# Patient Record
Sex: FEMALE | Race: BLACK OR AFRICAN AMERICAN | NOT HISPANIC OR LATINO | URBAN - METROPOLITAN AREA
[De-identification: names, ages, dates, MRNs, and addresses within clinical notes are randomized per-mention and may not be internally consistent; named-entity substitution may affect disease eponyms.]

---
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
## 2018-01-09 ENCOUNTER — EMERGENCY (EMERGENCY)
Facility: HOSPITAL | Age: 18
LOS: 1 days | Discharge: HOME | End: 2018-01-09
Attending: EMERGENCY MEDICINE
Payer: MEDICAID

## 2018-01-09 VITALS
HEIGHT: 63 IN | RESPIRATION RATE: 20 BRPM | SYSTOLIC BLOOD PRESSURE: 126 MMHG | HEART RATE: 102 BPM | WEIGHT: 130.07 LBS | TEMPERATURE: 99 F | DIASTOLIC BLOOD PRESSURE: 84 MMHG

## 2018-01-09 PROCEDURE — 70486 CT MAXILLOFACIAL W/O DYE: CPT | Mod: 26

## 2018-01-09 PROCEDURE — 99284 EMERGENCY DEPT VISIT MOD MDM: CPT

## 2018-01-09 PROCEDURE — 99284 EMERGENCY DEPT VISIT MOD MDM: CPT | Mod: 25

## 2018-01-09 PROCEDURE — 70486 CT MAXILLOFACIAL W/O DYE: CPT

## 2018-01-09 RX ORDER — OXYCODONE AND ACETAMINOPHEN 5; 325 MG/1; MG/1
1 TABLET ORAL ONCE
Qty: 0 | Refills: 0 | Status: DISCONTINUED | OUTPATIENT
Start: 2018-01-09 | End: 2018-01-09

## 2018-01-09 RX ADMIN — OXYCODONE AND ACETAMINOPHEN 1 TABLET(S): 5; 325 TABLET ORAL at 22:02

## 2018-01-09 NOTE — ED PROVIDER NOTE - ATTENDING CONTRIBUTION TO CARE
I, Francine Dee, performed the initial face to face bedside interview with this patient regarding history of present illness, review of symptoms and relevant past medical, social and family history.  I completed an independent physical examination.  I was the initial provider who evaluated this patient. I have signed out the follow up of any pending tests (i.e. labs, radiological studies) to the ACP.  I have communicated the patient’s plan of care and disposition with the ACP.

## 2018-01-09 NOTE — ED PROVIDER NOTE - PROGRESS NOTE DETAILS
CT results discussed with PT. ENT consult called. PT pain controlled with PO medication. ENT Andrés, advises PT to follow up as out PT. PT given referral to ENT. PT verbalized understanding of diagnosis and importance of follow up at [facility]. PT educated on importance of follow up and when to return to the ED.
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
## 2018-01-09 NOTE — ED PROVIDER NOTE - ENMT, MLM
Airway patent, Mouth with normal mucosa. Throat has no vesicles, no oropharyngeal exudates and uvula is midline. no septal hematoma. No blood in nares Airway patent, Mouth with normal mucosa. Throat has no vesicles, no oropharyngeal exudates and uvula is midline. no septal hematoma. No blood in nares. nose appears midline no obvious deformities + tenderness to nasal bridge and L maxillary

## 2018-01-10 VITALS
OXYGEN SATURATION: 99 % | TEMPERATURE: 98 F | SYSTOLIC BLOOD PRESSURE: 116 MMHG | RESPIRATION RATE: 18 BRPM | HEART RATE: 99 BPM | DIASTOLIC BLOOD PRESSURE: 79 MMHG

## 2018-01-14 ENCOUNTER — EMERGENCY (EMERGENCY)
Facility: HOSPITAL | Age: 18
LOS: 1 days | Discharge: HOME | End: 2018-01-14
Attending: EMERGENCY MEDICINE | Admitting: EMERGENCY MEDICINE
Payer: MEDICAID

## 2018-01-14 VITALS
OXYGEN SATURATION: 100 % | SYSTOLIC BLOOD PRESSURE: 122 MMHG | TEMPERATURE: 98 F | HEART RATE: 80 BPM | DIASTOLIC BLOOD PRESSURE: 83 MMHG | RESPIRATION RATE: 18 BRPM

## 2018-01-14 VITALS
SYSTOLIC BLOOD PRESSURE: 132 MMHG | DIASTOLIC BLOOD PRESSURE: 88 MMHG | RESPIRATION RATE: 16 BRPM | HEIGHT: 63 IN | TEMPERATURE: 98 F | WEIGHT: 130.07 LBS | HEART RATE: 81 BPM | OXYGEN SATURATION: 100 %

## 2018-01-14 PROCEDURE — 99283 EMERGENCY DEPT VISIT LOW MDM: CPT

## 2018-01-14 PROCEDURE — 99283 EMERGENCY DEPT VISIT LOW MDM: CPT | Mod: 25

## 2018-01-14 RX ORDER — IBUPROFEN 200 MG
600 TABLET ORAL ONCE
Qty: 0 | Refills: 0 | Status: COMPLETED | OUTPATIENT
Start: 2018-01-14 | End: 2018-01-14

## 2018-01-14 RX ORDER — OXYCODONE AND ACETAMINOPHEN 5; 325 MG/1; MG/1
1 TABLET ORAL ONCE
Qty: 0 | Refills: 0 | Status: DISCONTINUED | OUTPATIENT
Start: 2018-01-14 | End: 2018-01-14

## 2018-01-14 RX ORDER — LABETALOL HCL 100 MG
1 TABLET ORAL
Qty: 0 | Refills: 0 | COMMUNITY

## 2018-01-14 RX ORDER — IBUPROFEN 200 MG
1 TABLET ORAL
Qty: 20 | Refills: 0 | OUTPATIENT
Start: 2018-01-14 | End: 2018-01-18

## 2018-01-14 RX ADMIN — Medication 600 MILLIGRAM(S): at 02:49
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
## 2018-01-14 NOTE — ED PROVIDER NOTE - MEDICAL DECISION MAKING DETAILS
Will give pain meds, needs ENT follow-up. Explained to patient that she needs follow-up and cannot report to ED every time pain meds are finished.
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
## 2018-03-11 ENCOUNTER — EMERGENCY (EMERGENCY)
Facility: HOSPITAL | Age: 18
LOS: 1 days | Discharge: HOME | End: 2018-03-11
Attending: EMERGENCY MEDICINE
Payer: MEDICAID

## 2018-03-11 VITALS
RESPIRATION RATE: 16 BRPM | HEART RATE: 105 BPM | SYSTOLIC BLOOD PRESSURE: 122 MMHG | TEMPERATURE: 98 F | HEIGHT: 65 IN | OXYGEN SATURATION: 99 % | WEIGHT: 115.08 LBS | DIASTOLIC BLOOD PRESSURE: 84 MMHG

## 2018-03-11 LAB
ACETONE SERPL-MCNC: NEGATIVE — SIGNIFICANT CHANGE UP
ALBUMIN SERPL ELPH-MCNC: 4.6 G/DL — SIGNIFICANT CHANGE UP (ref 3.3–5.2)
ALP SERPL-CCNC: 128 U/L — HIGH (ref 40–120)
ALT FLD-CCNC: 12 U/L — SIGNIFICANT CHANGE UP
ANION GAP SERPL CALC-SCNC: 21 MMOL/L — HIGH (ref 5–17)
APPEARANCE UR: ABNORMAL
AST SERPL-CCNC: 15 U/L — SIGNIFICANT CHANGE UP
BACTERIA # UR AUTO: ABNORMAL
BASOPHILS # BLD AUTO: 0 K/UL — SIGNIFICANT CHANGE UP (ref 0–0.2)
BASOPHILS NFR BLD AUTO: 0.2 % — SIGNIFICANT CHANGE UP (ref 0–2)
BILIRUB SERPL-MCNC: 0.3 MG/DL — LOW (ref 0.4–2)
BILIRUB UR-MCNC: NEGATIVE — SIGNIFICANT CHANGE UP
BUN SERPL-MCNC: 16 MG/DL — SIGNIFICANT CHANGE UP (ref 8–20)
CALCIUM SERPL-MCNC: 11 MG/DL — HIGH (ref 8.6–10.2)
CHLORIDE SERPL-SCNC: 93 MMOL/L — LOW (ref 98–107)
CO2 SERPL-SCNC: 20 MMOL/L — LOW (ref 22–29)
COLOR SPEC: YELLOW — SIGNIFICANT CHANGE UP
CREAT SERPL-MCNC: 0.9 MG/DL — SIGNIFICANT CHANGE UP (ref 0.5–1.3)
DIFF PNL FLD: ABNORMAL
EOSINOPHIL # BLD AUTO: 0 K/UL — SIGNIFICANT CHANGE UP (ref 0–0.5)
EOSINOPHIL NFR BLD AUTO: 0.2 % — SIGNIFICANT CHANGE UP (ref 0–6)
EPI CELLS # UR: SIGNIFICANT CHANGE UP
GAS PNL BLDV: SIGNIFICANT CHANGE UP
GLUCOSE SERPL-MCNC: 481 MG/DL — HIGH (ref 70–115)
GLUCOSE UR QL: 1000 MG/DL
HCG SERPL-ACNC: <5 MIU/ML — SIGNIFICANT CHANGE UP
HCO3 BLDV-SCNC: 20 MMOL/L — SIGNIFICANT CHANGE UP (ref 20–26)
HCT VFR BLD CALC: 36.5 % — LOW (ref 37–47)
HGB BLD-MCNC: 12 G/DL — SIGNIFICANT CHANGE UP (ref 12–16)
KETONES UR-MCNC: ABNORMAL
LEUKOCYTE ESTERASE UR-ACNC: ABNORMAL
LYMPHOCYTES # BLD AUTO: 1.5 K/UL — SIGNIFICANT CHANGE UP (ref 1–4.8)
LYMPHOCYTES # BLD AUTO: 22.5 % — SIGNIFICANT CHANGE UP (ref 20–55)
MCHC RBC-ENTMCNC: 26.7 PG — LOW (ref 27–31)
MCHC RBC-ENTMCNC: 32.9 G/DL — SIGNIFICANT CHANGE UP (ref 32–36)
MCV RBC AUTO: 81.1 FL — SIGNIFICANT CHANGE UP (ref 81–99)
MONOCYTES # BLD AUTO: 0.3 K/UL — SIGNIFICANT CHANGE UP (ref 0–0.8)
MONOCYTES NFR BLD AUTO: 4.1 % — SIGNIFICANT CHANGE UP (ref 3–10)
NEUTROPHILS # BLD AUTO: 4.8 K/UL — SIGNIFICANT CHANGE UP (ref 1.8–8)
NEUTROPHILS NFR BLD AUTO: 73 % — SIGNIFICANT CHANGE UP (ref 37–73)
NITRITE UR-MCNC: NEGATIVE — SIGNIFICANT CHANGE UP
PCO2 BLDV: 44 MMHG — SIGNIFICANT CHANGE UP (ref 35–50)
PH BLDV: 7.29 — LOW (ref 7.32–7.43)
PH UR: 6 — SIGNIFICANT CHANGE UP (ref 5–8)
PLATELET # BLD AUTO: 562 K/UL — HIGH (ref 150–400)
PO2 BLDV: 62 MMHG — HIGH (ref 25–45)
POTASSIUM SERPL-MCNC: 4 MMOL/L — SIGNIFICANT CHANGE UP (ref 3.5–5.3)
POTASSIUM SERPL-SCNC: 4 MMOL/L — SIGNIFICANT CHANGE UP (ref 3.5–5.3)
PROT SERPL-MCNC: 8.4 G/DL — SIGNIFICANT CHANGE UP (ref 6.6–8.7)
PROT UR-MCNC: 30 MG/DL
RAPID RVP RESULT: DETECTED
RBC # BLD: 4.5 M/UL — SIGNIFICANT CHANGE UP (ref 4.4–5.2)
RBC # FLD: 13.7 % — SIGNIFICANT CHANGE UP (ref 11–15.6)
RBC CASTS # UR COMP ASSIST: ABNORMAL /HPF (ref 0–4)
RV+EV RNA SPEC QL NAA+PROBE: DETECTED
SAO2 % BLDV: 89 % — SIGNIFICANT CHANGE UP
SODIUM SERPL-SCNC: 134 MMOL/L — LOW (ref 135–145)
SP GR SPEC: 1.01 — SIGNIFICANT CHANGE UP (ref 1.01–1.02)
UROBILINOGEN FLD QL: NEGATIVE MG/DL — SIGNIFICANT CHANGE UP
WBC # BLD: 6.6 K/UL — SIGNIFICANT CHANGE UP (ref 4.8–10.8)
WBC # FLD AUTO: 6.6 K/UL — SIGNIFICANT CHANGE UP (ref 4.8–10.8)
WBC UR QL: SIGNIFICANT CHANGE UP

## 2018-03-11 PROCEDURE — 82803 BLOOD GASES ANY COMBINATION: CPT

## 2018-03-11 PROCEDURE — 85027 COMPLETE CBC AUTOMATED: CPT

## 2018-03-11 PROCEDURE — 87798 DETECT AGENT NOS DNA AMP: CPT

## 2018-03-11 PROCEDURE — 71046 X-RAY EXAM CHEST 2 VIEWS: CPT

## 2018-03-11 PROCEDURE — 80053 COMPREHEN METABOLIC PANEL: CPT

## 2018-03-11 PROCEDURE — 87486 CHLMYD PNEUM DNA AMP PROBE: CPT

## 2018-03-11 PROCEDURE — 94640 AIRWAY INHALATION TREATMENT: CPT

## 2018-03-11 PROCEDURE — 87633 RESP VIRUS 12-25 TARGETS: CPT

## 2018-03-11 PROCEDURE — 87086 URINE CULTURE/COLONY COUNT: CPT

## 2018-03-11 PROCEDURE — 93005 ELECTROCARDIOGRAM TRACING: CPT

## 2018-03-11 PROCEDURE — 84702 CHORIONIC GONADOTROPIN TEST: CPT

## 2018-03-11 PROCEDURE — 87581 M.PNEUMON DNA AMP PROBE: CPT

## 2018-03-11 PROCEDURE — 96375 TX/PRO/DX INJ NEW DRUG ADDON: CPT

## 2018-03-11 PROCEDURE — 96374 THER/PROPH/DIAG INJ IV PUSH: CPT

## 2018-03-11 PROCEDURE — 82009 KETONE BODYS QUAL: CPT

## 2018-03-11 PROCEDURE — 93010 ELECTROCARDIOGRAM REPORT: CPT

## 2018-03-11 PROCEDURE — 82962 GLUCOSE BLOOD TEST: CPT

## 2018-03-11 PROCEDURE — 81001 URINALYSIS AUTO W/SCOPE: CPT

## 2018-03-11 PROCEDURE — 36415 COLL VENOUS BLD VENIPUNCTURE: CPT

## 2018-03-11 PROCEDURE — 99285 EMERGENCY DEPT VISIT HI MDM: CPT

## 2018-03-11 PROCEDURE — 99284 EMERGENCY DEPT VISIT MOD MDM: CPT | Mod: 25

## 2018-03-11 PROCEDURE — 71046 X-RAY EXAM CHEST 2 VIEWS: CPT | Mod: 26

## 2018-03-11 RX ORDER — IPRATROPIUM/ALBUTEROL SULFATE 18-103MCG
3 AEROSOL WITH ADAPTER (GRAM) INHALATION ONCE
Qty: 0 | Refills: 0 | Status: COMPLETED | OUTPATIENT
Start: 2018-03-11 | End: 2018-03-11

## 2018-03-11 RX ORDER — KETOROLAC TROMETHAMINE 30 MG/ML
15 SYRINGE (ML) INJECTION ONCE
Qty: 0 | Refills: 0 | Status: DISCONTINUED | OUTPATIENT
Start: 2018-03-11 | End: 2018-03-11

## 2018-03-11 RX ORDER — INSULIN HUMAN 100 [IU]/ML
5 INJECTION, SOLUTION SUBCUTANEOUS
Qty: 100 | Refills: 0 | Status: DISCONTINUED | OUTPATIENT
Start: 2018-03-11 | End: 2018-03-15

## 2018-03-11 RX ORDER — SODIUM CHLORIDE 9 MG/ML
2000 INJECTION INTRAMUSCULAR; INTRAVENOUS; SUBCUTANEOUS ONCE
Qty: 0 | Refills: 0 | Status: COMPLETED | OUTPATIENT
Start: 2018-03-11 | End: 2018-03-11

## 2018-03-11 RX ADMIN — SODIUM CHLORIDE 1000 MILLILITER(S): 9 INJECTION INTRAMUSCULAR; INTRAVENOUS; SUBCUTANEOUS at 18:20

## 2018-03-11 RX ADMIN — INSULIN HUMAN 5 UNIT(S)/HR: 100 INJECTION, SOLUTION SUBCUTANEOUS at 19:44

## 2018-03-11 RX ADMIN — Medication 3 MILLILITER(S): at 18:23

## 2018-03-11 RX ADMIN — Medication 15 MILLIGRAM(S): at 18:23

## 2018-03-11 RX ADMIN — Medication 15 MILLIGRAM(S): at 18:44

## 2018-03-11 NOTE — ED ADULT NURSE REASSESSMENT NOTE - NS ED NURSE REASSESS COMMENT FT1
Insulin drip discontinued, pt given turkey sandwich and milk container, offers no complaints, denies pain, resp even and unlabored, color good, LEACH without difficulty, pt with patent 20G IV, awaiting dispo, will continue to monitor

## 2018-03-11 NOTE — ED PROVIDER NOTE - MEDICAL DECISION MAKING DETAILS
Pt presents with hyperglycemia in the setting of viral like infection. Plan to treat symptomatically, check labs to evaluate for DKA, CXR to r/o pneumonia, RVP, reevaluate.

## 2018-03-11 NOTE — ED PROVIDER NOTE - PROGRESS NOTE DETAILS
Repeat accucheck as noted.  Pt given po sandwich and cookies.  EKG sinus tachy @ 103 bpm, with no acute changes.  Pt stable for d/c and instructed to check accucheck AC meals and HS

## 2018-03-11 NOTE — ED PROVIDER NOTE - OBJECTIVE STATEMENT
17yoF with type 1 DM, presenting with 3 days of cough. Pt states she felt fine last night, but woke up nauseated this morning - vomited x 3 NBNB;  was not feeling well, checked her blood sugar and found that her monitor read " HiGH" .;  she states some insulin and went back to sleep ; she woke up again and found her blood sugar to be 536 and still wasn't feeling well so called ambulance;  She reports + rhinitis, sore throat; but denies fevers, diarrhea, abdominal pain. Pt not vaccinated against influenza this year.  Pt's usual insulin regimen is: 25U Lantus QHS and premeal Novolog    Patient endorses compliance with her insulin regimen and denies any missed doses;     ENdocrinology: Modesta  PMD:  Katie

## 2018-03-12 VITALS
SYSTOLIC BLOOD PRESSURE: 110 MMHG | DIASTOLIC BLOOD PRESSURE: 70 MMHG | TEMPERATURE: 98 F | HEART RATE: 91 BPM | OXYGEN SATURATION: 99 % | RESPIRATION RATE: 18 BRPM

## 2018-03-12 LAB
CULTURE RESULTS: SIGNIFICANT CHANGE UP
SPECIMEN SOURCE: SIGNIFICANT CHANGE UP

## 2018-04-22 ENCOUNTER — INPATIENT (INPATIENT)
Facility: HOSPITAL | Age: 18
LOS: 1 days | Discharge: HOME | DRG: 639 | End: 2018-04-24
Attending: HOSPITALIST | Admitting: INTERNAL MEDICINE
Payer: MEDICAID

## 2018-04-22 VITALS
OXYGEN SATURATION: 98 % | RESPIRATION RATE: 20 BRPM | DIASTOLIC BLOOD PRESSURE: 77 MMHG | WEIGHT: 119.93 LBS | HEART RATE: 150 BPM | TEMPERATURE: 98 F | HEIGHT: 62 IN | SYSTOLIC BLOOD PRESSURE: 108 MMHG

## 2018-04-22 DIAGNOSIS — E10.10 TYPE 1 DIABETES MELLITUS WITH KETOACIDOSIS WITHOUT COMA: ICD-10-CM

## 2018-04-22 DIAGNOSIS — E86.0 DEHYDRATION: ICD-10-CM

## 2018-04-22 LAB
ACETONE SERPL-MCNC: ABNORMAL
ACETONE SERPL-MCNC: ABNORMAL
ALBUMIN SERPL ELPH-MCNC: 4.8 G/DL — SIGNIFICANT CHANGE UP (ref 3.3–5.2)
ALP SERPL-CCNC: 109 U/L — SIGNIFICANT CHANGE UP (ref 40–120)
ALT FLD-CCNC: 23 U/L — SIGNIFICANT CHANGE UP
ANION GAP SERPL CALC-SCNC: 19 MMOL/L — HIGH (ref 5–17)
ANION GAP SERPL CALC-SCNC: 22 MMOL/L — HIGH (ref 5–17)
ANION GAP SERPL CALC-SCNC: 23 MMOL/L — HIGH (ref 5–17)
ANION GAP SERPL CALC-SCNC: 30 MMOL/L — HIGH (ref 5–17)
APPEARANCE UR: CLEAR — SIGNIFICANT CHANGE UP
APTT BLD: 26.7 SEC — LOW (ref 27.5–37.4)
AST SERPL-CCNC: 42 U/L — HIGH
BASE EXCESS BLDV CALC-SCNC: -13.4 MMOL/L — LOW (ref -2–2)
BASOPHILS # BLD AUTO: 0 K/UL — SIGNIFICANT CHANGE UP (ref 0–0.2)
BASOPHILS # BLD AUTO: 0 K/UL — SIGNIFICANT CHANGE UP (ref 0–0.2)
BASOPHILS NFR BLD AUTO: 0.2 % — SIGNIFICANT CHANGE UP (ref 0–2)
BASOPHILS NFR BLD AUTO: 0.4 % — SIGNIFICANT CHANGE UP (ref 0–2)
BILIRUB SERPL-MCNC: 0.3 MG/DL — LOW (ref 0.4–2)
BILIRUB UR-MCNC: ABNORMAL
BUN SERPL-MCNC: 14 MG/DL — SIGNIFICANT CHANGE UP (ref 8–20)
BUN SERPL-MCNC: 16 MG/DL — SIGNIFICANT CHANGE UP (ref 8–20)
BUN SERPL-MCNC: 17 MG/DL — SIGNIFICANT CHANGE UP (ref 8–20)
BUN SERPL-MCNC: 19 MG/DL — SIGNIFICANT CHANGE UP (ref 8–20)
CA-I SERPL-SCNC: 1.23 MMOL/L — SIGNIFICANT CHANGE UP (ref 1.15–1.33)
CALCIUM SERPL-MCNC: 10.5 MG/DL — HIGH (ref 8.6–10.2)
CALCIUM SERPL-MCNC: 8.7 MG/DL — SIGNIFICANT CHANGE UP (ref 8.6–10.2)
CALCIUM SERPL-MCNC: 8.8 MG/DL — SIGNIFICANT CHANGE UP (ref 8.6–10.2)
CALCIUM SERPL-MCNC: 8.9 MG/DL — SIGNIFICANT CHANGE UP (ref 8.6–10.2)
CHLORIDE BLDV-SCNC: 109 MMOL/L — HIGH (ref 98–107)
CHLORIDE SERPL-SCNC: 103 MMOL/L — SIGNIFICANT CHANGE UP (ref 98–107)
CHLORIDE SERPL-SCNC: 104 MMOL/L — SIGNIFICANT CHANGE UP (ref 98–107)
CHLORIDE SERPL-SCNC: 97 MMOL/L — LOW (ref 98–107)
CHLORIDE SERPL-SCNC: 98 MMOL/L — SIGNIFICANT CHANGE UP (ref 98–107)
CO2 SERPL-SCNC: 13 MMOL/L — LOW (ref 22–29)
CO2 SERPL-SCNC: 15 MMOL/L — LOW (ref 22–29)
CO2 SERPL-SCNC: 16 MMOL/L — LOW (ref 22–29)
CO2 SERPL-SCNC: 17 MMOL/L — LOW (ref 22–29)
COLOR SPEC: YELLOW — SIGNIFICANT CHANGE UP
COMMENT - URINE: SIGNIFICANT CHANGE UP
CREAT SERPL-MCNC: 0.98 MG/DL — SIGNIFICANT CHANGE UP (ref 0.5–1.3)
CREAT SERPL-MCNC: 1.05 MG/DL — SIGNIFICANT CHANGE UP (ref 0.5–1.3)
CREAT SERPL-MCNC: 1.12 MG/DL — SIGNIFICANT CHANGE UP (ref 0.5–1.3)
CREAT SERPL-MCNC: 1.17 MG/DL — SIGNIFICANT CHANGE UP (ref 0.5–1.3)
DIFF PNL FLD: ABNORMAL
EOSINOPHIL # BLD AUTO: 0 K/UL — SIGNIFICANT CHANGE UP (ref 0–0.5)
EOSINOPHIL # BLD AUTO: 0 K/UL — SIGNIFICANT CHANGE UP (ref 0–0.5)
EOSINOPHIL NFR BLD AUTO: 0 % — SIGNIFICANT CHANGE UP (ref 0–6)
EOSINOPHIL NFR BLD AUTO: 0 % — SIGNIFICANT CHANGE UP (ref 0–6)
EPI CELLS # UR: SIGNIFICANT CHANGE UP
GAS PNL BLDV: 145 MMOL/L — SIGNIFICANT CHANGE UP (ref 135–145)
GAS PNL BLDV: SIGNIFICANT CHANGE UP
GAS PNL BLDV: SIGNIFICANT CHANGE UP
GLUCOSE BLDC GLUCOMTR-MCNC: 106 MG/DL — HIGH (ref 70–99)
GLUCOSE BLDC GLUCOMTR-MCNC: 111 MG/DL — HIGH (ref 70–99)
GLUCOSE BLDC GLUCOMTR-MCNC: 116 MG/DL — HIGH (ref 70–99)
GLUCOSE BLDC GLUCOMTR-MCNC: 117 MG/DL — HIGH (ref 70–99)
GLUCOSE BLDC GLUCOMTR-MCNC: 122 MG/DL — HIGH (ref 70–99)
GLUCOSE BLDC GLUCOMTR-MCNC: 125 MG/DL — HIGH (ref 70–99)
GLUCOSE BLDC GLUCOMTR-MCNC: 126 MG/DL — HIGH (ref 70–99)
GLUCOSE BLDC GLUCOMTR-MCNC: 132 MG/DL — HIGH (ref 70–99)
GLUCOSE BLDC GLUCOMTR-MCNC: 137 MG/DL — HIGH (ref 70–99)
GLUCOSE BLDC GLUCOMTR-MCNC: 141 MG/DL — HIGH (ref 70–99)
GLUCOSE BLDC GLUCOMTR-MCNC: 144 MG/DL — HIGH (ref 70–99)
GLUCOSE BLDC GLUCOMTR-MCNC: 150 MG/DL — HIGH (ref 70–99)
GLUCOSE BLDC GLUCOMTR-MCNC: 156 MG/DL — HIGH (ref 70–99)
GLUCOSE BLDC GLUCOMTR-MCNC: 158 MG/DL — HIGH (ref 70–99)
GLUCOSE BLDC GLUCOMTR-MCNC: 164 MG/DL — HIGH (ref 70–99)
GLUCOSE BLDC GLUCOMTR-MCNC: 89 MG/DL — SIGNIFICANT CHANGE UP (ref 70–99)
GLUCOSE BLDV-MCNC: 138 MG/DL — HIGH (ref 70–99)
GLUCOSE SERPL-MCNC: 119 MG/DL — HIGH (ref 70–115)
GLUCOSE SERPL-MCNC: 145 MG/DL — HIGH (ref 70–115)
GLUCOSE SERPL-MCNC: 287 MG/DL — HIGH (ref 70–115)
GLUCOSE SERPL-MCNC: 63 MG/DL — LOW (ref 70–115)
GLUCOSE UR QL: 250 MG/DL
GRAN CASTS # UR COMP ASSIST: ABNORMAL /LPF
HBA1C BLD-MCNC: 13.7 % — HIGH (ref 4–5.6)
HCG UR QL: ABNORMAL
HCO3 BLDV-SCNC: 14 MMOL/L — LOW (ref 21–29)
HCT VFR BLD CALC: 34 % — LOW (ref 37–47)
HCT VFR BLD CALC: 37.8 % — SIGNIFICANT CHANGE UP (ref 37–47)
HCT VFR BLDA CALC: 37 — LOW (ref 39–50)
HGB BLD CALC-MCNC: 12 G/DL — SIGNIFICANT CHANGE UP (ref 11.5–15.5)
HGB BLD-MCNC: 11 G/DL — LOW (ref 12–16)
HGB BLD-MCNC: 12.4 G/DL — SIGNIFICANT CHANGE UP (ref 12–16)
KETONES UR-MCNC: ABNORMAL
LACTATE BLDV-MCNC: 6.2 MMOL/L — CRITICAL HIGH (ref 0.5–2)
LACTATE BLDV-MCNC: 6.3 MMOL/L — CRITICAL HIGH (ref 0.5–2)
LEUKOCYTE ESTERASE UR-ACNC: NEGATIVE — SIGNIFICANT CHANGE UP
LIDOCAIN IGE QN: 130 U/L — HIGH (ref 22–51)
LYMPHOCYTES # BLD AUTO: 1.3 K/UL — SIGNIFICANT CHANGE UP (ref 1–4.8)
LYMPHOCYTES # BLD AUTO: 1.7 K/UL — SIGNIFICANT CHANGE UP (ref 1–4.8)
LYMPHOCYTES # BLD AUTO: 24.5 % — SIGNIFICANT CHANGE UP (ref 20–55)
LYMPHOCYTES # BLD AUTO: 27.9 % — SIGNIFICANT CHANGE UP (ref 20–55)
MAGNESIUM SERPL-MCNC: 1.8 MG/DL — SIGNIFICANT CHANGE UP (ref 1.6–2.6)
MAGNESIUM SERPL-MCNC: 1.9 MG/DL — SIGNIFICANT CHANGE UP (ref 1.6–2.6)
MAGNESIUM SERPL-MCNC: 2.3 MG/DL — SIGNIFICANT CHANGE UP (ref 1.6–2.6)
MCHC RBC-ENTMCNC: 26.3 PG — LOW (ref 27–31)
MCHC RBC-ENTMCNC: 27 PG — SIGNIFICANT CHANGE UP (ref 27–31)
MCHC RBC-ENTMCNC: 32.4 G/DL — SIGNIFICANT CHANGE UP (ref 32–36)
MCHC RBC-ENTMCNC: 32.8 G/DL — SIGNIFICANT CHANGE UP (ref 32–36)
MCV RBC AUTO: 80.1 FL — LOW (ref 81–99)
MCV RBC AUTO: 83.3 FL — SIGNIFICANT CHANGE UP (ref 81–99)
MONOCYTES # BLD AUTO: 0.4 K/UL — SIGNIFICANT CHANGE UP (ref 0–0.8)
MONOCYTES # BLD AUTO: 0.6 K/UL — SIGNIFICANT CHANGE UP (ref 0–0.8)
MONOCYTES NFR BLD AUTO: 8 % — SIGNIFICANT CHANGE UP (ref 3–10)
MONOCYTES NFR BLD AUTO: 8.5 % — SIGNIFICANT CHANGE UP (ref 3–10)
NEUTROPHILS # BLD AUTO: 3 K/UL — SIGNIFICANT CHANGE UP (ref 1.8–8)
NEUTROPHILS # BLD AUTO: 4.6 K/UL — SIGNIFICANT CHANGE UP (ref 1.8–8)
NEUTROPHILS NFR BLD AUTO: 63 % — SIGNIFICANT CHANGE UP (ref 37–73)
NEUTROPHILS NFR BLD AUTO: 66.8 % — SIGNIFICANT CHANGE UP (ref 37–73)
NITRITE UR-MCNC: NEGATIVE — SIGNIFICANT CHANGE UP
OTHER CELLS CSF MANUAL: 14 ML/DL — LOW (ref 18–22)
PCO2 BLDV: 38 MMHG — SIGNIFICANT CHANGE UP (ref 35–50)
PH BLDV: 7.18 — CRITICAL LOW (ref 7.32–7.43)
PH UR: 5 — SIGNIFICANT CHANGE UP (ref 5–8)
PHOSPHATE SERPL-MCNC: 2 MG/DL — LOW (ref 2.4–4.7)
PHOSPHATE SERPL-MCNC: 2.3 MG/DL — LOW (ref 2.4–4.7)
PHOSPHATE SERPL-MCNC: 3.8 MG/DL — SIGNIFICANT CHANGE UP (ref 2.4–4.7)
PLATELET # BLD AUTO: 535 K/UL — HIGH (ref 150–400)
PLATELET # BLD AUTO: 712 K/UL — HIGH (ref 150–400)
PO2 BLDV: 56 MMHG — HIGH (ref 25–45)
POTASSIUM BLDV-SCNC: 3.9 MMOL/L — SIGNIFICANT CHANGE UP (ref 3.4–4.5)
POTASSIUM SERPL-MCNC: 3.4 MMOL/L — LOW (ref 3.5–5.3)
POTASSIUM SERPL-MCNC: 3.8 MMOL/L — SIGNIFICANT CHANGE UP (ref 3.5–5.3)
POTASSIUM SERPL-MCNC: 4 MMOL/L — SIGNIFICANT CHANGE UP (ref 3.5–5.3)
POTASSIUM SERPL-MCNC: 4.5 MMOL/L — SIGNIFICANT CHANGE UP (ref 3.5–5.3)
POTASSIUM SERPL-SCNC: 3.4 MMOL/L — LOW (ref 3.5–5.3)
POTASSIUM SERPL-SCNC: 3.8 MMOL/L — SIGNIFICANT CHANGE UP (ref 3.5–5.3)
POTASSIUM SERPL-SCNC: 4 MMOL/L — SIGNIFICANT CHANGE UP (ref 3.5–5.3)
POTASSIUM SERPL-SCNC: 4.5 MMOL/L — SIGNIFICANT CHANGE UP (ref 3.5–5.3)
PREGNANCY PROFILE, URINE COMMENT: SIGNIFICANT CHANGE UP
PROT SERPL-MCNC: 8.8 G/DL — HIGH (ref 6.6–8.7)
PROT UR-MCNC: 500 MG/DL
RAPID RVP RESULT: SIGNIFICANT CHANGE UP
RBC # BLD: 4.08 M/UL — LOW (ref 4.4–5.2)
RBC # BLD: 4.72 M/UL — SIGNIFICANT CHANGE UP (ref 4.4–5.2)
RBC # FLD: 16.3 % — HIGH (ref 11–15.6)
RBC # FLD: 16.4 % — HIGH (ref 11–15.6)
RBC CASTS # UR COMP ASSIST: SIGNIFICANT CHANGE UP /HPF (ref 0–4)
SAO2 % BLDV: 86 % — SIGNIFICANT CHANGE UP
SODIUM SERPL-SCNC: 135 MMOL/L — SIGNIFICANT CHANGE UP (ref 135–145)
SODIUM SERPL-SCNC: 139 MMOL/L — SIGNIFICANT CHANGE UP (ref 135–145)
SODIUM SERPL-SCNC: 141 MMOL/L — SIGNIFICANT CHANGE UP (ref 135–145)
SODIUM SERPL-SCNC: 142 MMOL/L — SIGNIFICANT CHANGE UP (ref 135–145)
SP GR SPEC: 1.03 — HIGH (ref 1.01–1.02)
TSH SERPL-MCNC: 1.59 UIU/ML — SIGNIFICANT CHANGE UP (ref 0.27–4.2)
UROBILINOGEN FLD QL: 1 MG/DL
WBC # BLD: 4.7 K/UL — LOW (ref 4.8–10.8)
WBC # BLD: 6.9 K/UL — SIGNIFICANT CHANGE UP (ref 4.8–10.8)
WBC # FLD AUTO: 4.7 K/UL — LOW (ref 4.8–10.8)
WBC # FLD AUTO: 6.9 K/UL — SIGNIFICANT CHANGE UP (ref 4.8–10.8)
WBC UR QL: SIGNIFICANT CHANGE UP

## 2018-04-22 PROCEDURE — 99291 CRITICAL CARE FIRST HOUR: CPT

## 2018-04-22 PROCEDURE — 93010 ELECTROCARDIOGRAM REPORT: CPT

## 2018-04-22 RX ORDER — INSULIN HUMAN 100 [IU]/ML
3 INJECTION, SOLUTION SUBCUTANEOUS
Qty: 100 | Refills: 0 | Status: DISCONTINUED | OUTPATIENT
Start: 2018-04-22 | End: 2018-04-23

## 2018-04-22 RX ORDER — INSULIN ASPART 100 [IU]/ML
23 INJECTION, SOLUTION SUBCUTANEOUS
Qty: 0 | Refills: 0 | COMMUNITY

## 2018-04-22 RX ORDER — DEXTROSE 50 % IN WATER 50 %
1000 SYRINGE (ML) INTRAVENOUS
Qty: 0 | Refills: 0 | Status: DISCONTINUED | OUTPATIENT
Start: 2018-04-22 | End: 2018-04-23

## 2018-04-22 RX ORDER — SODIUM CHLORIDE 9 MG/ML
1000 INJECTION, SOLUTION INTRAVENOUS
Qty: 0 | Refills: 0 | Status: DISCONTINUED | OUTPATIENT
Start: 2018-04-22 | End: 2018-04-24

## 2018-04-22 RX ORDER — POTASSIUM PHOSPHATE, MONOBASIC POTASSIUM PHOSPHATE, DIBASIC 236; 224 MG/ML; MG/ML
15 INJECTION, SOLUTION INTRAVENOUS ONCE
Qty: 0 | Refills: 0 | Status: COMPLETED | OUTPATIENT
Start: 2018-04-22 | End: 2018-04-22

## 2018-04-22 RX ORDER — MAGNESIUM SULFATE 500 MG/ML
2 VIAL (ML) INJECTION ONCE
Qty: 0 | Refills: 0 | Status: COMPLETED | OUTPATIENT
Start: 2018-04-22 | End: 2018-04-22

## 2018-04-22 RX ORDER — ENOXAPARIN SODIUM 100 MG/ML
0 INJECTION SUBCUTANEOUS
Qty: 0 | Refills: 0 | COMMUNITY

## 2018-04-22 RX ORDER — ENOXAPARIN SODIUM 100 MG/ML
40 INJECTION SUBCUTANEOUS EVERY 24 HOURS
Qty: 0 | Refills: 0 | Status: DISCONTINUED | OUTPATIENT
Start: 2018-04-22 | End: 2018-04-24

## 2018-04-22 RX ORDER — SODIUM CHLORIDE 9 MG/ML
1000 INJECTION, SOLUTION INTRAVENOUS
Qty: 0 | Refills: 0 | Status: DISCONTINUED | OUTPATIENT
Start: 2018-04-22 | End: 2018-04-22

## 2018-04-22 RX ORDER — GLUCAGON INJECTION, SOLUTION 0.5 MG/.1ML
1 INJECTION, SOLUTION SUBCUTANEOUS ONCE
Qty: 0 | Refills: 0 | Status: DISCONTINUED | OUTPATIENT
Start: 2018-04-22 | End: 2018-04-24

## 2018-04-22 RX ORDER — INSULIN ASPART 100 [IU]/ML
0 INJECTION, SOLUTION SUBCUTANEOUS
Qty: 0 | Refills: 0 | COMMUNITY

## 2018-04-22 RX ORDER — DEXTROSE 50 % IN WATER 50 %
1 SYRINGE (ML) INTRAVENOUS ONCE
Qty: 0 | Refills: 0 | Status: DISCONTINUED | OUTPATIENT
Start: 2018-04-22 | End: 2018-04-24

## 2018-04-22 RX ORDER — POTASSIUM PHOSPHATE, MONOBASIC POTASSIUM PHOSPHATE, DIBASIC 236; 224 MG/ML; MG/ML
30 INJECTION, SOLUTION INTRAVENOUS ONCE
Qty: 0 | Refills: 0 | Status: COMPLETED | OUTPATIENT
Start: 2018-04-22 | End: 2018-04-22

## 2018-04-22 RX ORDER — DEXTROSE 50 % IN WATER 50 %
25 SYRINGE (ML) INTRAVENOUS ONCE
Qty: 0 | Refills: 0 | Status: DISCONTINUED | OUTPATIENT
Start: 2018-04-22 | End: 2018-04-24

## 2018-04-22 RX ORDER — DEXTROSE 50 % IN WATER 50 %
12.5 SYRINGE (ML) INTRAVENOUS ONCE
Qty: 0 | Refills: 0 | Status: DISCONTINUED | OUTPATIENT
Start: 2018-04-22 | End: 2018-04-24

## 2018-04-22 RX ORDER — SODIUM CHLORIDE 9 MG/ML
2000 INJECTION INTRAMUSCULAR; INTRAVENOUS; SUBCUTANEOUS ONCE
Qty: 0 | Refills: 0 | Status: COMPLETED | OUTPATIENT
Start: 2018-04-22 | End: 2018-04-22

## 2018-04-22 RX ADMIN — Medication 250 MILLILITER(S): at 22:09

## 2018-04-22 RX ADMIN — SODIUM CHLORIDE 2000 MILLILITER(S): 9 INJECTION INTRAMUSCULAR; INTRAVENOUS; SUBCUTANEOUS at 09:02

## 2018-04-22 RX ADMIN — POTASSIUM PHOSPHATE, MONOBASIC POTASSIUM PHOSPHATE, DIBASIC 62.5 MILLIMOLE(S): 236; 224 INJECTION, SOLUTION INTRAVENOUS at 13:38

## 2018-04-22 RX ADMIN — POTASSIUM PHOSPHATE, MONOBASIC POTASSIUM PHOSPHATE, DIBASIC 83.33 MILLIMOLE(S): 236; 224 INJECTION, SOLUTION INTRAVENOUS at 18:51

## 2018-04-22 RX ADMIN — SODIUM CHLORIDE 200 MILLILITER(S): 9 INJECTION, SOLUTION INTRAVENOUS at 13:39

## 2018-04-22 RX ADMIN — ENOXAPARIN SODIUM 40 MILLIGRAM(S): 100 INJECTION SUBCUTANEOUS at 11:54

## 2018-04-22 RX ADMIN — Medication 50 GRAM(S): at 18:51

## 2018-04-22 RX ADMIN — INSULIN HUMAN 2 UNIT(S)/HR: 100 INJECTION, SOLUTION SUBCUTANEOUS at 22:09

## 2018-04-22 RX ADMIN — Medication 200 MILLILITER(S): at 20:21

## 2018-04-22 RX ADMIN — INSULIN HUMAN 1 UNIT(S)/HR: 100 INJECTION, SOLUTION SUBCUTANEOUS at 20:21

## 2018-04-22 RX ADMIN — INSULIN HUMAN 5 UNIT(S)/HR: 100 INJECTION, SOLUTION SUBCUTANEOUS at 09:42

## 2018-04-22 RX ADMIN — INSULIN HUMAN 1 UNIT(S)/HR: 100 INJECTION, SOLUTION SUBCUTANEOUS at 13:38

## 2018-04-22 NOTE — PATIENT PROFILE ADULT. - REASON FOR ADMISSION
pt states she was visiting her son who is a pt in our pedatric unit and was feeling very "dizzy", ped nurse advised that she be seen in our ER. Pt also shares that she has been experiencing stomach discomfort x 3 days with N & V.

## 2018-04-22 NOTE — PATIENT PROFILE ADULT. - AS SC BRADEN ACTIVITY
Detail Level: Zone
Initiate Treatment: Triamcinolone 0.1% ointment twice daily x4 weeks
(4) walks frequently

## 2018-04-22 NOTE — ED ADULT NURSE NOTE - OBJECTIVE STATEMENT
Patient A&Ox4 complaining of upper abdominal pain & nausea x2days. Denies taking any pain relief medications during this time. Denies any dysuria. LBM: 04/22/2018. LMP: 04/13/2018. Respirations even & unlabored, denies any numbness or tingling. Denies any chest pain, shortness of breath, or dizziness.

## 2018-04-22 NOTE — ED ADULT NURSE REASSESSMENT NOTE - NS ED NURSE REASSESS COMMENT FT1
Pt received awake, alert, and oriented x4 with even and unlabored respirations.  on CM. MD aware. C/O epigastric abdominal pain and nausea. Resting in stretcher with CM in place. Denies CP and SOB. Awaiting orders.
Pt transported to MICU and bedside report given to Wanda FORD. No alteration or decline in mental or resp status. Safety maintained at all times.
Pt resting in stretcher with even and unlabored respirations. Denies CP or SOB. CM remains in place with sinus tach. Insulin gtt held after fs 55. Pt alert and oriented x4 with out any distress present. Speech clear, logical, and coherent. Denies HA, nausea, or vomiting. Call placed to Audrey PA for maintenance fluid order. Pt awaiting transport to MICU. Spoke with Wanda FORD and made aware of pts arrival to unit. Safety maintained. RN to continue to monitor and reassess closely.

## 2018-04-22 NOTE — ED PROVIDER NOTE - OBJECTIVE STATEMENT
16 y/o  F pt with a hx of type 1 DB( takes insulin)and asthma presents to ED c/o upper abd pain, weakness and fatigue. Pt says she has been compliant with medications. Denies nausea no vomiting, No further complaints at this time.   lungs diminish hearts tachy dry Moist mucous mebranes mild tenders upper portionof abd no gaurdingno rebound mdm dka treata ccordingly iccu consulted at 9000 ok with inskluin drip no bolus 2 l of fluids infuded heart waves micu admission lethargic and 18 y/o  F pt with a hx of type 1 DB( takes insulin)and asthma presents to ED c/o upper abd pain, weakness and fatigue. Pt says she has been compliant with medications. Denies nausea no vomiting, No further complaints at this time.

## 2018-04-22 NOTE — ED PROVIDER NOTE - MEDICAL DECISION MAKING DETAILS
DKA treat accordingly. ICU consulted at 0900. Ok with insulin drip. No bolus 2 liters of fluids infuse. MICU admission.

## 2018-04-22 NOTE — H&P ADULT - ATTENDING COMMENTS
Pt admitted by [facility] PA, I have seen and evaluated this patient independently and agree with the above findings: 17 year old type I DM p/w nausea and abd discomfort found to be in DKA- continue fingersticks q1 hourly, r/o occult infection, IVF resusc, trend labs til gap closure- pt received bolus of insulin in ER and was hypoglycemic on arrival to ICU - insulin currently on hold.

## 2018-04-22 NOTE — H&P ADULT - HISTORY OF PRESENT ILLNESS
Pt is a 17 YOF h/o DM-I, asthma who presented to ED with h/o 3 days of abdominal pain and cramping associated with N/V.  Pt states her son was admitted to pediatrics last few days with PNA and she has been stressed out and possibly not taking good care of herself.  Pt denies recent illness, no fevers, no cough/cold sx.

## 2018-04-22 NOTE — H&P ADULT - PROBLEM SELECTOR PLAN 1
Volume resuscitation, received 2L in ED  insulin gtt/hourly accuchecks  monitor electrolytes and replete as needed every 6 hours initially  Pt's endocrine does not come here, can f/u as outpt

## 2018-04-23 DIAGNOSIS — J45.909 UNSPECIFIED ASTHMA, UNCOMPLICATED: ICD-10-CM

## 2018-04-23 DIAGNOSIS — E87.6 HYPOKALEMIA: ICD-10-CM

## 2018-04-23 LAB
ANION GAP SERPL CALC-SCNC: 12 MMOL/L — SIGNIFICANT CHANGE UP (ref 5–17)
ANION GAP SERPL CALC-SCNC: 16 MMOL/L — SIGNIFICANT CHANGE UP (ref 5–17)
ANION GAP SERPL CALC-SCNC: 17 MMOL/L — SIGNIFICANT CHANGE UP (ref 5–17)
ANION GAP SERPL CALC-SCNC: 19 MMOL/L — HIGH (ref 5–17)
BUN SERPL-MCNC: 10 MG/DL — SIGNIFICANT CHANGE UP (ref 8–20)
BUN SERPL-MCNC: 4 MG/DL — LOW (ref 8–20)
BUN SERPL-MCNC: 5 MG/DL — LOW (ref 8–20)
BUN SERPL-MCNC: 7 MG/DL — LOW (ref 8–20)
CALCIUM SERPL-MCNC: 8.5 MG/DL — LOW (ref 8.6–10.2)
CALCIUM SERPL-MCNC: 8.5 MG/DL — LOW (ref 8.6–10.2)
CALCIUM SERPL-MCNC: 8.6 MG/DL — SIGNIFICANT CHANGE UP (ref 8.6–10.2)
CALCIUM SERPL-MCNC: 8.6 MG/DL — SIGNIFICANT CHANGE UP (ref 8.6–10.2)
CHLORIDE SERPL-SCNC: 102 MMOL/L — SIGNIFICANT CHANGE UP (ref 98–107)
CHLORIDE SERPL-SCNC: 105 MMOL/L — SIGNIFICANT CHANGE UP (ref 98–107)
CHLORIDE SERPL-SCNC: 108 MMOL/L — HIGH (ref 98–107)
CHLORIDE SERPL-SCNC: 98 MMOL/L — SIGNIFICANT CHANGE UP (ref 98–107)
CO2 SERPL-SCNC: 16 MMOL/L — LOW (ref 22–29)
CO2 SERPL-SCNC: 16 MMOL/L — LOW (ref 22–29)
CO2 SERPL-SCNC: 17 MMOL/L — LOW (ref 22–29)
CO2 SERPL-SCNC: 19 MMOL/L — LOW (ref 22–29)
CREAT SERPL-MCNC: 0.6 MG/DL — SIGNIFICANT CHANGE UP (ref 0.5–1.3)
CREAT SERPL-MCNC: 0.69 MG/DL — SIGNIFICANT CHANGE UP (ref 0.5–1.3)
CREAT SERPL-MCNC: 0.71 MG/DL — SIGNIFICANT CHANGE UP (ref 0.5–1.3)
CREAT SERPL-MCNC: 0.81 MG/DL — SIGNIFICANT CHANGE UP (ref 0.5–1.3)
GLUCOSE BLDC GLUCOMTR-MCNC: 101 MG/DL — HIGH (ref 70–99)
GLUCOSE BLDC GLUCOMTR-MCNC: 102 MG/DL — HIGH (ref 70–99)
GLUCOSE BLDC GLUCOMTR-MCNC: 107 MG/DL — HIGH (ref 70–99)
GLUCOSE BLDC GLUCOMTR-MCNC: 115 MG/DL — HIGH (ref 70–99)
GLUCOSE BLDC GLUCOMTR-MCNC: 117 MG/DL — HIGH (ref 70–99)
GLUCOSE BLDC GLUCOMTR-MCNC: 123 MG/DL — HIGH (ref 70–99)
GLUCOSE BLDC GLUCOMTR-MCNC: 131 MG/DL — HIGH (ref 70–99)
GLUCOSE BLDC GLUCOMTR-MCNC: 133 MG/DL — HIGH (ref 70–99)
GLUCOSE BLDC GLUCOMTR-MCNC: 146 MG/DL — HIGH (ref 70–99)
GLUCOSE BLDC GLUCOMTR-MCNC: 147 MG/DL — HIGH (ref 70–99)
GLUCOSE BLDC GLUCOMTR-MCNC: 155 MG/DL — HIGH (ref 70–99)
GLUCOSE BLDC GLUCOMTR-MCNC: 164 MG/DL — HIGH (ref 70–99)
GLUCOSE BLDC GLUCOMTR-MCNC: 167 MG/DL — HIGH (ref 70–99)
GLUCOSE BLDC GLUCOMTR-MCNC: 173 MG/DL — HIGH (ref 70–99)
GLUCOSE BLDC GLUCOMTR-MCNC: 55 MG/DL — LOW (ref 70–99)
GLUCOSE BLDC GLUCOMTR-MCNC: 60 MG/DL — LOW (ref 70–99)
GLUCOSE BLDC GLUCOMTR-MCNC: 70 MG/DL — SIGNIFICANT CHANGE UP (ref 70–99)
GLUCOSE BLDC GLUCOMTR-MCNC: 76 MG/DL — SIGNIFICANT CHANGE UP (ref 70–99)
GLUCOSE BLDC GLUCOMTR-MCNC: 85 MG/DL — SIGNIFICANT CHANGE UP (ref 70–99)
GLUCOSE BLDC GLUCOMTR-MCNC: 90 MG/DL — SIGNIFICANT CHANGE UP (ref 70–99)
GLUCOSE BLDC GLUCOMTR-MCNC: 97 MG/DL — SIGNIFICANT CHANGE UP (ref 70–99)
GLUCOSE SERPL-MCNC: 109 MG/DL — SIGNIFICANT CHANGE UP (ref 70–115)
GLUCOSE SERPL-MCNC: 120 MG/DL — HIGH (ref 70–115)
GLUCOSE SERPL-MCNC: 134 MG/DL — HIGH (ref 70–115)
GLUCOSE SERPL-MCNC: 139 MG/DL — HIGH (ref 70–115)
HCT VFR BLD CALC: 28.2 % — LOW (ref 37–47)
HGB BLD-MCNC: 9.2 G/DL — LOW (ref 12–16)
MAGNESIUM SERPL-MCNC: 2.3 MG/DL — SIGNIFICANT CHANGE UP (ref 1.6–2.6)
MAGNESIUM SERPL-MCNC: 2.4 MG/DL — SIGNIFICANT CHANGE UP (ref 1.6–2.6)
MAGNESIUM SERPL-MCNC: 2.5 MG/DL — SIGNIFICANT CHANGE UP (ref 1.6–2.6)
MAGNESIUM SERPL-MCNC: 2.8 MG/DL — HIGH (ref 1.6–2.6)
MCHC RBC-ENTMCNC: 26.9 PG — LOW (ref 27–31)
MCHC RBC-ENTMCNC: 32.6 G/DL — SIGNIFICANT CHANGE UP (ref 32–36)
MCV RBC AUTO: 82.5 FL — SIGNIFICANT CHANGE UP (ref 81–99)
PHOSPHATE SERPL-MCNC: 2.6 MG/DL — SIGNIFICANT CHANGE UP (ref 2.4–4.7)
PHOSPHATE SERPL-MCNC: 3.1 MG/DL — SIGNIFICANT CHANGE UP (ref 2.4–4.7)
PHOSPHATE SERPL-MCNC: 4.2 MG/DL — SIGNIFICANT CHANGE UP (ref 2.4–4.7)
PHOSPHATE SERPL-MCNC: 4.3 MG/DL — SIGNIFICANT CHANGE UP (ref 2.4–4.7)
PLATELET # BLD AUTO: 386 K/UL — SIGNIFICANT CHANGE UP (ref 150–400)
POTASSIUM SERPL-MCNC: 3.4 MMOL/L — LOW (ref 3.5–5.3)
POTASSIUM SERPL-MCNC: 3.4 MMOL/L — LOW (ref 3.5–5.3)
POTASSIUM SERPL-MCNC: 4.3 MMOL/L — SIGNIFICANT CHANGE UP (ref 3.5–5.3)
POTASSIUM SERPL-MCNC: 4.5 MMOL/L — SIGNIFICANT CHANGE UP (ref 3.5–5.3)
POTASSIUM SERPL-SCNC: 3.4 MMOL/L — LOW (ref 3.5–5.3)
POTASSIUM SERPL-SCNC: 3.4 MMOL/L — LOW (ref 3.5–5.3)
POTASSIUM SERPL-SCNC: 4.3 MMOL/L — SIGNIFICANT CHANGE UP (ref 3.5–5.3)
POTASSIUM SERPL-SCNC: 4.5 MMOL/L — SIGNIFICANT CHANGE UP (ref 3.5–5.3)
RBC # BLD: 3.42 M/UL — LOW (ref 4.4–5.2)
RBC # FLD: 16.4 % — HIGH (ref 11–15.6)
SODIUM SERPL-SCNC: 133 MMOL/L — LOW (ref 135–145)
SODIUM SERPL-SCNC: 136 MMOL/L — SIGNIFICANT CHANGE UP (ref 135–145)
SODIUM SERPL-SCNC: 137 MMOL/L — SIGNIFICANT CHANGE UP (ref 135–145)
SODIUM SERPL-SCNC: 139 MMOL/L — SIGNIFICANT CHANGE UP (ref 135–145)
WBC # BLD: 4.5 K/UL — LOW (ref 4.8–10.8)
WBC # FLD AUTO: 4.5 K/UL — LOW (ref 4.8–10.8)

## 2018-04-23 PROCEDURE — 99233 SBSQ HOSP IP/OBS HIGH 50: CPT

## 2018-04-23 RX ORDER — POTASSIUM CHLORIDE 20 MEQ
40 PACKET (EA) ORAL ONCE
Qty: 0 | Refills: 0 | Status: COMPLETED | OUTPATIENT
Start: 2018-04-23 | End: 2018-04-23

## 2018-04-23 RX ORDER — INSULIN GLARGINE 100 [IU]/ML
23 INJECTION, SOLUTION SUBCUTANEOUS ONCE
Qty: 0 | Refills: 0 | Status: COMPLETED | OUTPATIENT
Start: 2018-04-23 | End: 2018-04-23

## 2018-04-23 RX ORDER — INSULIN LISPRO 100/ML
5 VIAL (ML) SUBCUTANEOUS
Qty: 0 | Refills: 0 | Status: DISCONTINUED | OUTPATIENT
Start: 2018-04-23 | End: 2018-04-24

## 2018-04-23 RX ORDER — ALBUTEROL 90 UG/1
2.5 AEROSOL, METERED ORAL EVERY 4 HOURS
Qty: 0 | Refills: 0 | Status: DISCONTINUED | OUTPATIENT
Start: 2018-04-23 | End: 2018-04-24

## 2018-04-23 RX ORDER — SODIUM CHLORIDE 9 MG/ML
1000 INJECTION, SOLUTION INTRAVENOUS
Qty: 0 | Refills: 0 | Status: DISCONTINUED | OUTPATIENT
Start: 2018-04-23 | End: 2018-04-23

## 2018-04-23 RX ORDER — ENOXAPARIN SODIUM 100 MG/ML
23 INJECTION SUBCUTANEOUS
Qty: 0 | Refills: 0 | COMMUNITY

## 2018-04-23 RX ORDER — DEXTROSE 50 % IN WATER 50 %
12.5 SYRINGE (ML) INTRAVENOUS ONCE
Qty: 0 | Refills: 0 | Status: COMPLETED | OUTPATIENT
Start: 2018-04-23 | End: 2018-04-23

## 2018-04-23 RX ORDER — DEXTROSE 10 % IN WATER 10 %
1000 INTRAVENOUS SOLUTION INTRAVENOUS
Qty: 0 | Refills: 0 | Status: DISCONTINUED | OUTPATIENT
Start: 2018-04-23 | End: 2018-04-23

## 2018-04-23 RX ORDER — INSULIN GLARGINE 100 [IU]/ML
23 INJECTION, SOLUTION SUBCUTANEOUS AT BEDTIME
Qty: 0 | Refills: 0 | Status: DISCONTINUED | OUTPATIENT
Start: 2018-04-24 | End: 2018-04-24

## 2018-04-23 RX ORDER — INSULIN LISPRO 100/ML
VIAL (ML) SUBCUTANEOUS
Qty: 0 | Refills: 0 | Status: DISCONTINUED | OUTPATIENT
Start: 2018-04-23 | End: 2018-04-24

## 2018-04-23 RX ORDER — POTASSIUM CHLORIDE 20 MEQ
10 PACKET (EA) ORAL
Qty: 0 | Refills: 0 | Status: COMPLETED | OUTPATIENT
Start: 2018-04-23 | End: 2018-04-23

## 2018-04-23 RX ORDER — ENOXAPARIN SODIUM 100 MG/ML
25 INJECTION SUBCUTANEOUS
Qty: 0 | Refills: 0 | COMMUNITY

## 2018-04-23 RX ADMIN — INSULIN GLARGINE 23 UNIT(S): 100 INJECTION, SOLUTION SUBCUTANEOUS at 21:12

## 2018-04-23 RX ADMIN — INSULIN HUMAN 3 UNIT(S)/HR: 100 INJECTION, SOLUTION SUBCUTANEOUS at 08:28

## 2018-04-23 RX ADMIN — Medication 100 MILLIEQUIVALENT(S): at 09:29

## 2018-04-23 RX ADMIN — ENOXAPARIN SODIUM 40 MILLIGRAM(S): 100 INJECTION SUBCUTANEOUS at 12:18

## 2018-04-23 RX ADMIN — Medication 100 MILLIEQUIVALENT(S): at 08:28

## 2018-04-23 RX ADMIN — Medication 100 MILLIEQUIVALENT(S): at 04:00

## 2018-04-23 RX ADMIN — Medication 12.5 GRAM(S): at 04:43

## 2018-04-23 RX ADMIN — Medication 100 MILLIEQUIVALENT(S): at 02:55

## 2018-04-23 RX ADMIN — Medication 60 MILLILITER(S): at 08:28

## 2018-04-23 RX ADMIN — Medication 250 MILLILITER(S): at 00:35

## 2018-04-23 RX ADMIN — Medication 40 MILLIEQUIVALENT(S): at 01:27

## 2018-04-23 RX ADMIN — Medication 100 MILLIEQUIVALENT(S): at 06:49

## 2018-04-23 RX ADMIN — Medication 100 MILLIEQUIVALENT(S): at 01:27

## 2018-04-23 RX ADMIN — INSULIN HUMAN 3 UNIT(S)/HR: 100 INJECTION, SOLUTION SUBCUTANEOUS at 01:27

## 2018-04-23 RX ADMIN — SODIUM CHLORIDE 250 MILLILITER(S): 9 INJECTION, SOLUTION INTRAVENOUS at 08:28

## 2018-04-23 RX ADMIN — Medication 40 MILLIEQUIVALENT(S): at 08:28

## 2018-04-23 NOTE — PROGRESS NOTE ADULT - SUBJECTIVE AND OBJECTIVE BOX
Patient is a 17y old  Female who presents with a chief complaint of DKA (2018 11:37)      BRIEF HOSPITAL COURSE: 17 yof with DKA    Events last 24 hours:     PAST MEDICAL & SURGICAL HISTORY:  Diabetes: type I  Asthma   delivery delivered      Review of Systems:  CONSTITUTIONAL: No fever, chills, or fatigue  EYES: No eye pain, visual disturbances, or discharge  ENMT:  No difficulty hearing, tinnitus, vertigo; No sinus or throat pain  NECK: No pain or stiffness  RESPIRATORY: No cough, wheezing, chills or hemoptysis; No shortness of breath  CARDIOVASCULAR: No chest pain, palpitations, dizziness, or leg swelling  GASTROINTESTINAL: No abdominal or epigastric pain. No nausea, vomiting, or hematemesis; No diarrhea or constipation. No melena or hematochezia.  GENITOURINARY: No dysuria, frequency, hematuria, or incontinence  NEUROLOGICAL: No headaches, memory loss, loss of strength, numbness, or tremors  SKIN: No itching, burning, rashes, or lesions   MUSCULOSKELETAL: No joint pain or swelling; No muscle, back, or extremity pain  PSYCHIATRIC: No depression, anxiety, mood swings, or difficulty sleeping      Medications:            enoxaparin Injectable 40 milliGRAM(s) SubCutaneous every 24 hours        dextrose 50% Injectable 12.5 Gram(s) IV Push once  dextrose 50% Injectable 25 Gram(s) IV Push once  dextrose 50% Injectable 25 Gram(s) IV Push once  dextrose Gel 1 Dose(s) Oral once PRN  glucagon  Injectable 1 milliGRAM(s) IntraMuscular once PRN  insulin Infusion 3 Unit(s)/Hr IV Continuous <Continuous>    dextrose 10%. 1000 milliLiter(s) IV Continuous <Continuous>  dextrose 5% + lactated ringers. 1000 milliLiter(s) IV Continuous <Continuous>  dextrose 5%. 1000 milliLiter(s) IV Continuous <Continuous>  potassium chloride  10 mEq/100 mL IVPB 10 milliEquivalent(s) IV Intermittent every 1 hour                ICU Vital Signs Last 24 Hrs  T(C): 37 (2018 01:00), Max: 37.1 (2018 10:02)  T(F): 98.6 (2018 01:00), Max: 98.7 (2018 10:02)  HR: 93 (2018 09:00) (90 - 115)  BP: 114/82 (2018 09:00) (88/59 - 130/90)  BP(mean): 94 (2018 09:00) (68 - 107)  ABP: --  ABP(mean): --  RR: 17 (2018 09:00) (10 - 29)  SpO2: 100% (2018 09:00) (98% - 100%)          I&O's Detail    2018 07:01  -  2018 07:00  --------------------------------------------------------  IN:    dextrose 10%.: 240 mL    dextrose 5% + lactated ringers.: 1700 mL    dextrose 5%.: 1400 mL    insulin Infusion: 18 mL    insulin Infusion: 10 mL    insulin Infusion: 5 mL    insulin Infusion: 8 mL    Oral Fluid: 120 mL    Sodium Chloride 0.9% IV Bolus: 2000 mL    Solution: 1450 mL    Solution: 958.1 mL    Solution: 300 mL    Solution: 50 mL  Total IN: 8259.1 mL    OUT:    Voided: 3000 mL  Total OUT: 3000 mL    Total NET: 5259.1 mL      2018 07:01  -  2018 09:06  --------------------------------------------------------  IN:    dextrose 10%.: 140 mL    dextrose 5% + lactated ringers.: 500 mL    insulin Infusion: 6 mL    Solution: 200 mL  Total IN: 846 mL    OUT:    Voided: 780 mL  Total OUT: 780 mL    Total NET: 66 mL            LABS:                        9.2    4.5   )-----------( 386      ( 2018 05:48 )             28.2         136  |  102  |  7.0<L>  ----------------------------<  134<H>  3.4<L>   |  17.0<L>  |  0.71    Ca    8.5<L>      2018 05:45  Phos  4.2       Mg     2.4         TPro  8.8<H>  /  Alb  4.8  /  TBili  0.3<L>  /  DBili  x   /  AST  42<H>  /  ALT  23  /  AlkPhos  109            CAPILLARY BLOOD GLUCOSE      POCT Blood Glucose.: 70 mg/dL (2018 08:01)    PTT - ( 2018 07:55 )  PTT:26.7 sec  Urinalysis Basic - ( 2018 07:54 )    Color: Yellow / Appearance: Clear / S.030 / pH: x  Gluc: x / Ketone: Large  / Bili: Moderate / Urobili: 1 mg/dL   Blood: x / Protein: 500 mg/dL / Nitrite: Negative   Leuk Esterase: Negative / RBC: 0-2 /HPF / WBC 3-5   Sq Epi: x / Non Sq Epi: Occasional / Bacteria: x      CULTURES:  Rapid RVP Result: NotDetec (18 @ 10:05)      Physical Examination:    General: No acute distress.  Alert, oriented, interactive, nonfocal    HEENT: Pupils equal, reactive to light.  Symmetric.    PULM: Clear to auscultation bilaterally, no significant sputum production    CVS: Regular rate and rhythm, no murmurs, rubs, or gallops    ABD: Soft, nondistended, nontender, normoactive bowel sounds, no masses    EXT: No edema, nontender    SKIN: Warm and well perfused, no rashes noted. Patient is a 17y old  Female who presents with a chief complaint of DKA (2018 11:37)      BRIEF HOSPITAL COURSE: 17 yof with DKA    Events last 24 hours: no acute issues    PAST MEDICAL & SURGICAL HISTORY:  Diabetes: type I  Asthma   delivery delivered      Review of Systems:  CONSTITUTIONAL: No fever, chills, or fatigue  EYES: No eye pain, visual disturbances, or discharge  ENMT:  No difficulty hearing, tinnitus, vertigo; No sinus or throat pain  NECK: No pain or stiffness  RESPIRATORY: No cough, wheezing, chills or hemoptysis; No shortness of breath  CARDIOVASCULAR: No chest pain, palpitations, dizziness, or leg swelling  GASTROINTESTINAL: No abdominal or epigastric pain. No nausea, vomiting, or hematemesis; No diarrhea or constipation. No melena or hematochezia.  GENITOURINARY: No dysuria, frequency, hematuria, or incontinence  NEUROLOGICAL: No headaches, memory loss, loss of strength, numbness, or tremors  SKIN: No itching, burning, rashes, or lesions   MUSCULOSKELETAL: No joint pain or swelling; No muscle, back, or extremity pain  PSYCHIATRIC: No depression, anxiety, mood swings, or difficulty sleeping      Medications:            enoxaparin Injectable 40 milliGRAM(s) SubCutaneous every 24 hours        dextrose 50% Injectable 12.5 Gram(s) IV Push once  dextrose 50% Injectable 25 Gram(s) IV Push once  dextrose 50% Injectable 25 Gram(s) IV Push once  dextrose Gel 1 Dose(s) Oral once PRN  glucagon  Injectable 1 milliGRAM(s) IntraMuscular once PRN  insulin Infusion 3 Unit(s)/Hr IV Continuous <Continuous>    dextrose 10%. 1000 milliLiter(s) IV Continuous <Continuous>  dextrose 5% + lactated ringers. 1000 milliLiter(s) IV Continuous <Continuous>  dextrose 5%. 1000 milliLiter(s) IV Continuous <Continuous>  potassium chloride  10 mEq/100 mL IVPB 10 milliEquivalent(s) IV Intermittent every 1 hour                ICU Vital Signs Last 24 Hrs  T(C): 37 (2018 01:00), Max: 37.1 (2018 10:02)  T(F): 98.6 (2018 01:00), Max: 98.7 (2018 10:02)  HR: 93 (2018 09:00) (90 - 115)  BP: 114/82 (2018 09:00) (88/59 - 130/90)  BP(mean): 94 (2018 09:00) (68 - 107)  ABP: --  ABP(mean): --  RR: 17 (2018 09:00) (10 - 29)  SpO2: 100% (2018 09:00) (98% - 100%)          I&O's Detail    2018 07:01  -  2018 07:00  --------------------------------------------------------  IN:    dextrose 10%.: 240 mL    dextrose 5% + lactated ringers.: 1700 mL    dextrose 5%.: 1400 mL    insulin Infusion: 18 mL    insulin Infusion: 10 mL    insulin Infusion: 5 mL    insulin Infusion: 8 mL    Oral Fluid: 120 mL    Sodium Chloride 0.9% IV Bolus: 2000 mL    Solution: 1450 mL    Solution: 958.1 mL    Solution: 300 mL    Solution: 50 mL  Total IN: 8259.1 mL    OUT:    Voided: 3000 mL  Total OUT: 3000 mL    Total NET: 5259.1 mL      2018 07:01  -  2018 09:06  --------------------------------------------------------  IN:    dextrose 10%.: 140 mL    dextrose 5% + lactated ringers.: 500 mL    insulin Infusion: 6 mL    Solution: 200 mL  Total IN: 846 mL    OUT:    Voided: 780 mL  Total OUT: 780 mL    Total NET: 66 mL            LABS:                        9.2    4.5   )-----------( 386      ( 2018 05:48 )             28.2         136  |  102  |  7.0<L>  ----------------------------<  134<H>  3.4<L>   |  17.0<L>  |  0.71    Ca    8.5<L>      2018 05:45  Phos  4.2       Mg     2.4     -    TPro  8.8<H>  /  Alb  4.8  /  TBili  0.3<L>  /  DBili  x   /  AST  42<H>  /  ALT  23  /  AlkPhos  109            CAPILLARY BLOOD GLUCOSE      POCT Blood Glucose.: 70 mg/dL (2018 08:01)    PTT - ( 2018 07:55 )  PTT:26.7 sec  Urinalysis Basic - ( 2018 07:54 )    Color: Yellow / Appearance: Clear / S.030 / pH: x  Gluc: x / Ketone: Large  / Bili: Moderate / Urobili: 1 mg/dL   Blood: x / Protein: 500 mg/dL / Nitrite: Negative   Leuk Esterase: Negative / RBC: 0-2 /HPF / WBC 3-5   Sq Epi: x / Non Sq Epi: Occasional / Bacteria: x      CULTURES:  Rapid RVP Result: NotDetec (18 @ 10:05)      Physical Examination:    General: No acute distress.  Alert, oriented, interactive, nonfocal    HEENT: Pupils equal, reactive to light.  Symmetric.    PULM: Clear to auscultation bilaterally, no significant sputum production    CVS: Regular rate and rhythm, no murmurs, rubs, or gallops    ABD: Soft, nondistended, nontender, normoactive bowel sounds, no masses    EXT: No edema, nontender    SKIN: Warm and well perfused, no rashes noted.

## 2018-04-23 NOTE — PROGRESS NOTE ADULT - I WAS PHYSICALLY PRESENT FOR THE KEY PORTIONS OF THE EVALUATION AND MANAGEMENT (E/M) SERVICE PROVIDED.  I AGREE WITH THE ABOVE HISTORY, PHYSICAL, AND PLAN WHICH I HAVE REVIEWED AND EDITED WHERE APPROPRIATE
Routing NORCO refill request to provider for review/approval because:  Drug not on the [facility] refill protocol     SUGAR Knapp         Statement Selected

## 2018-04-23 NOTE — PROGRESS NOTE ADULT - SUBJECTIVE AND OBJECTIVE BOX
PMD: Dr. Bennett  Endo: Dr. Casey    CC: Abdominal Pain, Nausea and Vomiting    HPI:  17 years old female with PMH of IDDM and Asthma presented with abdominal pain, nausea and vomiting - found to have DKA. She was managed in MICU. Currently off the Insulin Infusion. Anion Gap is closed. Bicarb is improving. She is tolerating PO. No active complaints at this time.     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    Allergies:  amoxicillin (Hives)  Mushrooms (Hives)  penicillin (Hives)  Prozac (Hives)    REVIEW OF SYSTEMS:  As per HPI. Otherwise unremarkable.     MEDICATIONS  (STANDING):  dextrose 5%. 1000 milliLiter(s) (50 mL/Hr) IV Continuous <Continuous>  dextrose 50% Injectable 12.5 Gram(s) IV Push once  dextrose 50% Injectable 25 Gram(s) IV Push once  dextrose 50% Injectable 25 Gram(s) IV Push once  enoxaparin Injectable 40 milliGRAM(s) SubCutaneous every 24 hours  insulin lispro (HumaLOG) corrective regimen sliding scale   SubCutaneous three times a day before meals  insulin lispro Injectable (HumaLOG) 5 Unit(s) SubCutaneous three times a day before meals    MEDICATIONS  (PRN):  dextrose Gel 1 Dose(s) Oral once PRN Blood Glucose LESS THAN 70 milliGRAM(s)/deciliter  glucagon  Injectable 1 milliGRAM(s) IntraMuscular once PRN Glucose LESS THAN 70 milligrams/deciliter      PHYSICAL EXAM:  Vital Signs Last 24 Hrs  T(C): 36.9 (2018 20:08), Max: 37 (2018 01:00)  T(F): 98.4 (2018 20:08), Max: 98.6 (2018 01:00)  HR: 97 (2018 21:00) (90 - 105)  BP: 114/80 (2018 21:00) (88/59 - 125/79)  BP(mean): 93 (2018 21:00) (68 - 101)  RR: 15 (2018 21:00) (11 - 22)  SpO2: 100% (2018 21:00) (99% - 100%)  General: Well developed. Well nourished. No acute distress  HEENT: PERRLA. EOMI. Clear conjunctivae. Moist mucus membrane  Neck: Supple. No JVD. No Thyromegaly   Chest: CTA bilaterally - no wheezing, rales or rhonchi.   Heart: Normal S1 & S2. RRR. No murmur.   Abdomen: Soft. Non-tender. Non-distended. + BS  Ext: No pedal edema. No calf tenderness   Neuro: AAO x 3, No focal deficit, CN II-XII grossly WNL and no speech disorder  Skin: Warm and Dry  Psychiatry: Normal mood and affect    I&O's Summary    2018 07:  -  2018 07:00  --------------------------------------------------------  IN: 8259.1 mL / OUT: 3000 mL / NET: 5259.1 mL    2018 07:  -  2018 22:40  --------------------------------------------------------  IN: 6342 mL / OUT: 3380 mL / NET: 2962 mL        LABS:  CAPILLARY BLOOD GLUCOSE  POCT Blood Glucose.: 90 mg/dL (2018 22:02)  POCT Blood Glucose.: 107 mg/dL (2018 21:12)  POCT Blood Glucose.: 97 mg/dL (2018 20:02)  POCT Blood Glucose.: 101 mg/dL (2018 18:53)  POCT Blood Glucose.: 117 mg/dL (2018 17:57)  POCT Blood Glucose.: 123 mg/dL (2018 17:09)  POCT Blood Glucose.: 147 mg/dL (2018 16:01)  POCT Blood Glucose.: 167 mg/dL (2018 14:52)  POCT Blood Glucose.: 164 mg/dL (2018 14:00)  POCT Blood Glucose.: 155 mg/dL (2018 13:10)  POCT Blood Glucose.: 131 mg/dL (2018 12:05)  POCT Blood Glucose.: 146 mg/dL (2018 11:15)  POCT Blood Glucose.: 173 mg/dL (2018 10:00)  POCT Blood Glucose.: 115 mg/dL (2018 08:59)  POCT Blood Glucose.: 70 mg/dL (2018 08:01)  POCT Blood Glucose.: 85 mg/dL (2018 06:38)  POCT Blood Glucose.: 133 mg/dL (2018 05:28)  POCT Blood Glucose.: 60 mg/dL (2018 03:54)  POCT Blood Glucose.: 55 mg/dL (2018 02:51)  POCT Blood Glucose.: 76 mg/dL (2018 02:16)  POCT Blood Glucose.: 102 mg/dL (2018 01:12)  POCT Blood Glucose.: 132 mg/dL (2018 23:52)  POCT Blood Glucose.: 106 mg/dL (2018 22:58)                          9.2    4.5   )-----------( 386      ( 2018 05:48 )             28.2     04-23    139  |  108<H>  |  4.0<L>  ----------------------------<  120<H>  4.3   |  19.0<L>  |  0.69    Ca    8.6      2018 18:06  Phos  2.6     -  Mg     2.3     -    TPro  8.8<H>  /  Alb  4.8  /  TBili  0.3<L>  /  DBili  x   /  AST  42<H>  /  ALT  23  /  AlkPhos  109  04-22    PTT - ( 2018 07:55 )  PTT:26.7 sec  Urinalysis Basic - ( 2018 07:54 )    Color: Yellow / Appearance: Clear / S.030 / pH: x  Gluc: x / Ketone: Large  / Bili: Moderate / Urobili: 1 mg/dL   Blood: x / Protein: 500 mg/dL / Nitrite: Negative   Leuk Esterase: Negative / RBC: 0-2 /HPF / WBC 3-5   Sq Epi: x / Non Sq Epi: Occasional / Bacteria: x PMD: Dr. Bennett  Endo: Dr. Byers    CC: Abdominal Pain, Nausea and Vomiting    HPI:  17 years old female with PMH of IDDM and Asthma presented with abdominal pain, nausea and vomiting - found to have DKA. She was managed in MICU. Currently off the Insulin Infusion. Anion Gap is closed. Bicarb is improving. She is tolerating PO. No active complaints at this time.     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    Allergies:  amoxicillin (Hives)  Mushrooms (Hives)  penicillin (Hives)  Prozac (Hives)    REVIEW OF SYSTEMS:  As per HPI. Otherwise unremarkable.     MEDICATIONS  (STANDING):  dextrose 5%. 1000 milliLiter(s) (50 mL/Hr) IV Continuous <Continuous>  dextrose 50% Injectable 12.5 Gram(s) IV Push once  dextrose 50% Injectable 25 Gram(s) IV Push once  dextrose 50% Injectable 25 Gram(s) IV Push once  enoxaparin Injectable 40 milliGRAM(s) SubCutaneous every 24 hours  insulin lispro (HumaLOG) corrective regimen sliding scale   SubCutaneous three times a day before meals  insulin lispro Injectable (HumaLOG) 5 Unit(s) SubCutaneous three times a day before meals    MEDICATIONS  (PRN):  dextrose Gel 1 Dose(s) Oral once PRN Blood Glucose LESS THAN 70 milliGRAM(s)/deciliter  glucagon  Injectable 1 milliGRAM(s) IntraMuscular once PRN Glucose LESS THAN 70 milligrams/deciliter      PHYSICAL EXAM:  Vital Signs Last 24 Hrs  T(C): 36.9 (2018 20:08), Max: 37 (2018 01:00)  T(F): 98.4 (2018 20:08), Max: 98.6 (2018 01:00)  HR: 97 (2018 21:00) (90 - 105)  BP: 114/80 (2018 21:00) (88/59 - 125/79)  BP(mean): 93 (2018 21:00) (68 - 101)  RR: 15 (2018 21:00) (11 - 22)  SpO2: 100% (2018 21:00) (99% - 100%)  General: Well developed. Well nourished. No acute distress  HEENT: PERRLA. EOMI. Clear conjunctivae. Moist mucus membrane  Neck: Supple. No JVD. No Thyromegaly   Chest: CTA bilaterally - no wheezing, rales or rhonchi.   Heart: Normal S1 & S2. RRR. No murmur.   Abdomen: Soft. Non-tender. Non-distended. + BS  Ext: No pedal edema. No calf tenderness   Neuro: AAO x 3, No focal deficit, CN II-XII grossly WNL and no speech disorder  Skin: Warm and Dry  Psychiatry: Normal mood and affect    I&O's Summary    2018 07:  -  2018 07:00  --------------------------------------------------------  IN: 8259.1 mL / OUT: 3000 mL / NET: 5259.1 mL    2018 07:  -  2018 22:40  --------------------------------------------------------  IN: 6342 mL / OUT: 3380 mL / NET: 2962 mL        LABS:  CAPILLARY BLOOD GLUCOSE  POCT Blood Glucose.: 90 mg/dL (2018 22:02)  POCT Blood Glucose.: 107 mg/dL (2018 21:12)  POCT Blood Glucose.: 97 mg/dL (2018 20:02)  POCT Blood Glucose.: 101 mg/dL (2018 18:53)  POCT Blood Glucose.: 117 mg/dL (2018 17:57)  POCT Blood Glucose.: 123 mg/dL (2018 17:09)  POCT Blood Glucose.: 147 mg/dL (2018 16:01)  POCT Blood Glucose.: 167 mg/dL (2018 14:52)  POCT Blood Glucose.: 164 mg/dL (2018 14:00)  POCT Blood Glucose.: 155 mg/dL (2018 13:10)  POCT Blood Glucose.: 131 mg/dL (2018 12:05)  POCT Blood Glucose.: 146 mg/dL (2018 11:15)  POCT Blood Glucose.: 173 mg/dL (2018 10:00)  POCT Blood Glucose.: 115 mg/dL (2018 08:59)  POCT Blood Glucose.: 70 mg/dL (2018 08:01)  POCT Blood Glucose.: 85 mg/dL (2018 06:38)  POCT Blood Glucose.: 133 mg/dL (2018 05:28)  POCT Blood Glucose.: 60 mg/dL (2018 03:54)  POCT Blood Glucose.: 55 mg/dL (2018 02:51)  POCT Blood Glucose.: 76 mg/dL (2018 02:16)  POCT Blood Glucose.: 102 mg/dL (2018 01:12)  POCT Blood Glucose.: 132 mg/dL (2018 23:52)  POCT Blood Glucose.: 106 mg/dL (2018 22:58)                          9.2    4.5   )-----------( 386      ( 2018 05:48 )             28.2     04-23    139  |  108<H>  |  4.0<L>  ----------------------------<  120<H>  4.3   |  19.0<L>  |  0.69    Ca    8.6      2018 18:06  Phos  2.6     -  Mg     2.3     -    TPro  8.8<H>  /  Alb  4.8  /  TBili  0.3<L>  /  DBili  x   /  AST  42<H>  /  ALT  23  /  AlkPhos  109  04-22    PTT - ( 2018 07:55 )  PTT:26.7 sec  Urinalysis Basic - ( 2018 07:54 )    Color: Yellow / Appearance: Clear / S.030 / pH: x  Gluc: x / Ketone: Large  / Bili: Moderate / Urobili: 1 mg/dL   Blood: x / Protein: 500 mg/dL / Nitrite: Negative   Leuk Esterase: Negative / RBC: 0-2 /HPF / WBC 3-5   Sq Epi: x / Non Sq Epi: Occasional / Bacteria: x

## 2018-04-23 NOTE — PROGRESS NOTE ADULT - PROBLEM SELECTOR PLAN 1
insulin gtt with serial lytes allow diet due to hypoglycemia, once gap closed will transition to sq insulin

## 2018-04-23 NOTE — PROGRESS NOTE ADULT - ASSESSMENT
17 years old female with PMH of IDDM and Asthma presented with abdominal pain, nausea and vomiting - found to have DKA. She was managed in MICU. Currently off the Insulin Infusion. Anion Gap is closed. Bicarb is improving. She is tolerating PO. No active complaints at this time.     1) DKA  - Off the Insulin Infusion. Anion Gap is closed. Bicarb improving. Tolerating PO.  - Continue Accu checks, RISS, Premeal Humalog and Lantus  - Diabetes Education  - HbA1c 13.7  2) Asthma  - Albuterol PRN  DVT Prophylaxis -- Lovenox 40 mg 17 years old female with PMH of IDDM and Asthma presented with abdominal pain, nausea and vomiting - found to have DKA. She was managed in MICU. Currently off the Insulin Infusion. Anion Gap is closed. Bicarb is improving. She is tolerating PO. No active complaints at this time.     1) DKA  - Likely secondary to non compliance  - HbA1c 13.7  - Off the Insulin Infusion. Anion Gap is closed. Bicarb improving. Tolerating PO.  - Continue Accu checks, RISS, Premeal Humalog and Lantus  - Diabetes Education  2) Asthma  - Albuterol PRN  3) Anemia  - Chronic  - Outpatient work up  DVT Prophylaxis -- Lovenox 40 mg

## 2018-04-24 ENCOUNTER — TRANSCRIPTION ENCOUNTER (OUTPATIENT)
Age: 18
End: 2018-04-24

## 2018-04-24 VITALS
DIASTOLIC BLOOD PRESSURE: 78 MMHG | HEART RATE: 98 BPM | RESPIRATION RATE: 18 BRPM | TEMPERATURE: 99 F | OXYGEN SATURATION: 98 % | SYSTOLIC BLOOD PRESSURE: 120 MMHG

## 2018-04-24 LAB
ANION GAP SERPL CALC-SCNC: 9 MMOL/L — SIGNIFICANT CHANGE UP (ref 5–17)
BUN SERPL-MCNC: 6 MG/DL — LOW (ref 8–20)
CALCIUM SERPL-MCNC: 8.5 MG/DL — LOW (ref 8.6–10.2)
CHLORIDE SERPL-SCNC: 111 MMOL/L — HIGH (ref 98–107)
CO2 SERPL-SCNC: 23 MMOL/L — SIGNIFICANT CHANGE UP (ref 22–29)
CREAT SERPL-MCNC: 0.63 MG/DL — SIGNIFICANT CHANGE UP (ref 0.5–1.3)
GLUCOSE BLDC GLUCOMTR-MCNC: 150 MG/DL — HIGH (ref 70–99)
GLUCOSE BLDC GLUCOMTR-MCNC: 294 MG/DL — HIGH (ref 70–99)
GLUCOSE BLDC GLUCOMTR-MCNC: 93 MG/DL — SIGNIFICANT CHANGE UP (ref 70–99)
GLUCOSE SERPL-MCNC: 68 MG/DL — LOW (ref 70–115)
MAGNESIUM SERPL-MCNC: 2.2 MG/DL — SIGNIFICANT CHANGE UP (ref 1.6–2.6)
PHOSPHATE SERPL-MCNC: 3.9 MG/DL — SIGNIFICANT CHANGE UP (ref 2.4–4.7)
POTASSIUM SERPL-MCNC: 4.3 MMOL/L — SIGNIFICANT CHANGE UP (ref 3.5–5.3)
POTASSIUM SERPL-SCNC: 4.3 MMOL/L — SIGNIFICANT CHANGE UP (ref 3.5–5.3)
SODIUM SERPL-SCNC: 143 MMOL/L — SIGNIFICANT CHANGE UP (ref 135–145)

## 2018-04-24 PROCEDURE — 83690 ASSAY OF LIPASE: CPT

## 2018-04-24 PROCEDURE — 96374 THER/PROPH/DIAG INJ IV PUSH: CPT

## 2018-04-24 PROCEDURE — 87798 DETECT AGENT NOS DNA AMP: CPT

## 2018-04-24 PROCEDURE — 82009 KETONE BODYS QUAL: CPT

## 2018-04-24 PROCEDURE — 84702 CHORIONIC GONADOTROPIN TEST: CPT

## 2018-04-24 PROCEDURE — 84295 ASSAY OF SERUM SODIUM: CPT

## 2018-04-24 PROCEDURE — 85730 THROMBOPLASTIN TIME PARTIAL: CPT

## 2018-04-24 PROCEDURE — 81025 URINE PREGNANCY TEST: CPT

## 2018-04-24 PROCEDURE — 36415 COLL VENOUS BLD VENIPUNCTURE: CPT

## 2018-04-24 PROCEDURE — 82435 ASSAY OF BLOOD CHLORIDE: CPT

## 2018-04-24 PROCEDURE — 80048 BASIC METABOLIC PNL TOTAL CA: CPT

## 2018-04-24 PROCEDURE — 93005 ELECTROCARDIOGRAM TRACING: CPT

## 2018-04-24 PROCEDURE — 84132 ASSAY OF SERUM POTASSIUM: CPT

## 2018-04-24 PROCEDURE — 83036 HEMOGLOBIN GLYCOSYLATED A1C: CPT

## 2018-04-24 PROCEDURE — 82947 ASSAY GLUCOSE BLOOD QUANT: CPT

## 2018-04-24 PROCEDURE — 87633 RESP VIRUS 12-25 TARGETS: CPT

## 2018-04-24 PROCEDURE — 99285 EMERGENCY DEPT VISIT HI MDM: CPT | Mod: 25

## 2018-04-24 PROCEDURE — 80053 COMPREHEN METABOLIC PANEL: CPT

## 2018-04-24 PROCEDURE — 85014 HEMATOCRIT: CPT

## 2018-04-24 PROCEDURE — 87581 M.PNEUMON DNA AMP PROBE: CPT

## 2018-04-24 PROCEDURE — 81001 URINALYSIS AUTO W/SCOPE: CPT

## 2018-04-24 PROCEDURE — 83735 ASSAY OF MAGNESIUM: CPT

## 2018-04-24 PROCEDURE — 84100 ASSAY OF PHOSPHORUS: CPT

## 2018-04-24 PROCEDURE — 99239 HOSP IP/OBS DSCHRG MGMT >30: CPT

## 2018-04-24 PROCEDURE — 82962 GLUCOSE BLOOD TEST: CPT

## 2018-04-24 PROCEDURE — 82803 BLOOD GASES ANY COMBINATION: CPT

## 2018-04-24 PROCEDURE — 85027 COMPLETE CBC AUTOMATED: CPT

## 2018-04-24 PROCEDURE — 87040 BLOOD CULTURE FOR BACTERIA: CPT

## 2018-04-24 PROCEDURE — 83605 ASSAY OF LACTIC ACID: CPT

## 2018-04-24 PROCEDURE — 84443 ASSAY THYROID STIM HORMONE: CPT

## 2018-04-24 PROCEDURE — 82330 ASSAY OF CALCIUM: CPT

## 2018-04-24 PROCEDURE — 87486 CHLMYD PNEUM DNA AMP PROBE: CPT

## 2018-04-24 RX ORDER — INSULIN ASPART 100 [IU]/ML
5 INJECTION, SOLUTION SUBCUTANEOUS
Qty: 450 | Refills: 0 | OUTPATIENT
Start: 2018-04-24 | End: 2018-05-23

## 2018-04-24 RX ORDER — INSULIN ASPART 100 [IU]/ML
12 INJECTION, SOLUTION SUBCUTANEOUS
Qty: 0 | Refills: 0 | COMMUNITY

## 2018-04-24 RX ORDER — ALBUTEROL 90 UG/1
2 AEROSOL, METERED ORAL
Qty: 1 | Refills: 0 | OUTPATIENT
Start: 2018-04-24 | End: 2018-05-23

## 2018-04-24 RX ADMIN — Medication 6: at 16:19

## 2018-04-24 RX ADMIN — ENOXAPARIN SODIUM 40 MILLIGRAM(S): 100 INJECTION SUBCUTANEOUS at 11:27

## 2018-04-24 RX ADMIN — Medication 5 UNIT(S): at 16:20

## 2018-04-24 RX ADMIN — Medication 5 UNIT(S): at 08:01

## 2018-04-24 RX ADMIN — Medication 5 UNIT(S): at 11:22

## 2018-04-24 NOTE — DISCHARGE NOTE ADULT - MEDICATION SUMMARY - MEDICATIONS TO CHANGE
I will SWITCH the dose or number of times a day I take the medications listed below when I get home from the hospital:    insulin aspart 100 units/mL subcutaneous solution  -- 12  subcutaneous 3 times a day (before meals)

## 2018-04-24 NOTE — DISCHARGE NOTE ADULT - PROVIDER TOKENS
FREE:[LAST:[Vitaliy Byers],PHONE:[(   )    -],FAX:[(   )    -],ADDRESS:[Dr. Vitaliy Byers MD  Address: [address] [place] [address]  Phone: [phone]]],TOKEN:'[id number]'

## 2018-04-24 NOTE — DISCHARGE NOTE ADULT - CARE PLAN
Principal Discharge DX:	DKA, type 1  Goal:	resolved  Assessment and plan of treatment:	Likely due to medication non compliance. A1c 13. Blood sugars well controlled on Lantus 23U & Humalog 5U. Continue the same regimen. Please take your medications as directed. Follow up with Dr. Byers in 1 week of discharge  Secondary Diagnosis:	Asthma  Assessment and plan of treatment:	Continue with home meds as directed. Follow up with your primary doctor within 1 week of discharge  Secondary Diagnosis:	Hypokalemia  Assessment and plan of treatment:	Resolved. Follow up with your primary doctor within 1 week of discharge

## 2018-04-24 NOTE — DISCHARGE NOTE ADULT - COMMUNITY RESOURCES
[facility] DIABETES OUT-PATIENT. [address] [place]. [facility] [address]. [phone].

## 2018-04-24 NOTE — DISCHARGE NOTE ADULT - MEDICATION SUMMARY - MEDICATIONS TO STOP TAKING
I will STOP taking the medications listed below when I get home from the hospital:     mg oral tablet  -- 1 tab(s) by mouth every 6 hours, As Needed -for moderate pain   -- Do not take this drug if you are pregnant.  It is very important that you take or use this exactly as directed.  Do not skip doses or discontinue unless directed by your doctor.  May cause drowsiness or dizziness.  Obtain medical advice before taking any non-prescription drugs as some may affect the action of this medication.  Take with food or milk.

## 2018-04-24 NOTE — DISCHARGE NOTE ADULT - PATIENT PORTAL LINK FT
You can access the [facility] Patient Portal, offered by [facility], by registering with the following website: [email or web address]

## 2018-04-24 NOTE — ADVANCED PRACTICE NURSE CONSULT - RECOMMEDATIONS
continue diabetes self management education  pls consider to dc pt on 10 units humalog before meals and 20 of lantus qhs  pls refer to dr la for fu  cc- please refer pt to diabetes wellness out pt

## 2018-04-24 NOTE — DISCHARGE NOTE ADULT - PLAN OF CARE
resolved Likely due to medication non compliance. A1c 13. Blood sugars well controlled on Lantus 23U & Humalog 5U. Continue the same regimen. Please take your medications as directed. Follow up with Dr. Byers in 1 week of discharge Continue with home meds as directed. Follow up with your primary doctor within 1 week of discharge Resolved. Follow up with your primary doctor within 1 week of discharge

## 2018-04-24 NOTE — DISCHARGE NOTE ADULT - CARE PROVIDER_API CALL
Dr. Vitaliy Flores MD  Address: [address] [place] [address]  Phone: [phone]  Phone: (   )    -  Fax: (   )    -    Taiwo Bennett), Internal Medicine  [address] [place] [address]  Phone: [phone]  Fax: [phone]

## 2018-04-24 NOTE — CDI QUERY NOTE - NSCDIOTHERTXTBX_GEN_ALL_CORE_HH
Hypophosphatemia    Admitted with Phos 1.8, then 2.3, 2.0.  Tx with KPhos x 2.  Repeat Phos 4.3.  Please provide a dx for abnormal lab with tx provided if clinically significant.

## 2018-04-24 NOTE — DISCHARGE NOTE ADULT - MEDICATION SUMMARY - MEDICATIONS TO TAKE
I will START or STAY ON the medications listed below when I get home from the hospital:    Lanpatricia Solostar Pen 100 units/mL subcutaneous solution  -- 23 unit(s) subcutaneous once a day (at bedtime)  -- Indication: For DM    NovoLOG FlexPen 100 units/mL injectable solution  -- 5 unit(s) injectable 3 times a day (with meals)   -- Check with your doctor before becoming pregnant.  Do not drink alcoholic beverages when taking this medication.  Keep in refrigerator.  Do not freeze.  Obtain medical advice before taking any non-prescription drugs as some may affect the action of this medication.    -- Indication: For DM    ProAir HFA 90 mcg/inh inhalation aerosol  -- 2 puff(s) inhaled 4 times a day   -- For inhalation only.  It is very important that you take or use this exactly as directed.  Do not skip doses or discontinue unless directed by your doctor.  Obtain medical advice before taking any non-prescription drugs as some may affect the action of this medication.  Shake well before use.    -- Indication: For Asthma

## 2018-04-24 NOTE — DISCHARGE NOTE ADULT - HOSPITAL COURSE
17 years old female with PMH of IDDM and Asthma presented with abdominal pain, nausea and vomiting - found to have DKA. She was managed in MICU. Currently off the Insulin Infusion. Anion Gap is closed. Bicarb is improving. She is tolerating PO. No active complaints at this time.     1) DKA  - Likely secondary to non compliance  - HbA1c 13.7  - Off the Insulin Infusion. Anion Gap is closed. Bicarb improving. Tolerating PO.  - Continue Accu checks, RISS, Premeal Humalog and Lantus  - Diabetes Education done  Disccussed in details with pt's endocrinologist Dr. Byers who endorses pt's non compliance & does not have any blood work from the pt over 2 years. Disccussed with patient importance of blood sugar control & risks of non complaince. Blood sugars well controlled 's on Lantus 23U & Humalog 5U. Pt is on 26 & 12 at home but is likely not using it. Continue the same hospital regimen. Disccussed with Dr. Byers who agrees with the plan. Follow up with Dr. Byers in 1 week of discharge     2) Asthma- stable  - Albuterol PRN    3) Anemia  - Chronic  - Outpatient work up  DVT Prophylaxis -- Lovenox 40 mg      PHYSICAL EXAM-  GENERAL: NAD, well-groomed, well-developed  HEAD:  Atraumatic, Normocephalic  EYES: EOMI, PERRLA, conjunctiva and sclera clear  NECK: Supple, No JVD  NERVOUS SYSTEM:  Alert & Oriented X3, Motor Strength 5/5 B/L upper and lower extremities; DTRs 2+ intact and symmetric  CHEST/LUNG: Clear to percussion bilaterally; No rales, rhonchi, wheezing, or rubs  HEART: Regular rate and rhythm; No murmurs, rubs, or gallops  ABDOMEN: Soft, Nontender, Nondistended; Bowel sounds present  EXTREMITIES:  2+ Peripheral Pulses, No clubbing, cyanosis, or edema    VSS. Medically stable for discharge

## 2018-04-27 LAB
CULTURE RESULTS: SIGNIFICANT CHANGE UP
CULTURE RESULTS: SIGNIFICANT CHANGE UP
SPECIMEN SOURCE: SIGNIFICANT CHANGE UP
SPECIMEN SOURCE: SIGNIFICANT CHANGE UP

## 2018-05-17 ENCOUNTER — INPATIENT (INPATIENT)
Facility: HOSPITAL | Age: 18
LOS: 2 days | Discharge: HOME | DRG: 639 | End: 2018-05-20
Attending: HOSPITALIST | Admitting: INTERNAL MEDICINE
Payer: MEDICAID

## 2018-05-17 VITALS
OXYGEN SATURATION: 99 % | HEART RATE: 134 BPM | RESPIRATION RATE: 36 BRPM | TEMPERATURE: 98 F | DIASTOLIC BLOOD PRESSURE: 77 MMHG | SYSTOLIC BLOOD PRESSURE: 133 MMHG

## 2018-05-17 DIAGNOSIS — E10.10 TYPE 1 DIABETES MELLITUS WITH KETOACIDOSIS WITHOUT COMA: ICD-10-CM

## 2018-05-17 DIAGNOSIS — J45.909 UNSPECIFIED ASTHMA, UNCOMPLICATED: ICD-10-CM

## 2018-05-17 LAB
ANION GAP SERPL CALC-SCNC: >32 MMOL/L — HIGH (ref 5–17)
ANISOCYTOSIS BLD QL: SLIGHT — SIGNIFICANT CHANGE UP
APPEARANCE UR: CLEAR — SIGNIFICANT CHANGE UP
APTT BLD: 28.4 SEC — SIGNIFICANT CHANGE UP (ref 27.5–37.4)
BACTERIA # UR AUTO: ABNORMAL
BASOPHILS # BLD AUTO: 0 K/UL — SIGNIFICANT CHANGE UP (ref 0–0.2)
BASOPHILS NFR BLD AUTO: 0 % — SIGNIFICANT CHANGE UP (ref 0–2)
BILIRUB UR-MCNC: NEGATIVE — SIGNIFICANT CHANGE UP
BUN SERPL-MCNC: 21 MG/DL — HIGH (ref 8–20)
CALCIUM SERPL-MCNC: 8.5 MG/DL — LOW (ref 8.6–10.2)
CHLORIDE SERPL-SCNC: 100 MMOL/L — SIGNIFICANT CHANGE UP (ref 98–107)
CO2 SERPL-SCNC: <6 MMOL/L — CRITICAL LOW (ref 22–29)
COLOR SPEC: YELLOW — SIGNIFICANT CHANGE UP
CREAT SERPL-MCNC: 0.99 MG/DL — SIGNIFICANT CHANGE UP (ref 0.5–1.3)
DIFF PNL FLD: NEGATIVE — SIGNIFICANT CHANGE UP
EOSINOPHIL # BLD AUTO: 0 K/UL — SIGNIFICANT CHANGE UP (ref 0–0.5)
EOSINOPHIL NFR BLD AUTO: 0 % — SIGNIFICANT CHANGE UP (ref 0–6)
EPI CELLS # UR: SIGNIFICANT CHANGE UP
GAS PNL BLDA: SIGNIFICANT CHANGE UP
GAS PNL BLDV: SIGNIFICANT CHANGE UP
GLUCOSE SERPL-MCNC: 731 MG/DL — CRITICAL HIGH (ref 70–115)
GLUCOSE UR QL: 1000 MG/DL
HCG SERPL-ACNC: <5 MIU/ML — SIGNIFICANT CHANGE UP
HCT VFR BLD CALC: 40.3 % — SIGNIFICANT CHANGE UP (ref 37–47)
HGB BLD-MCNC: 12.2 G/DL — SIGNIFICANT CHANGE UP (ref 12–16)
HYPOCHROMIA BLD QL: SLIGHT — SIGNIFICANT CHANGE UP
INR BLD: 1.04 RATIO — SIGNIFICANT CHANGE UP (ref 0.88–1.16)
KETONES UR-MCNC: ABNORMAL
LEUKOCYTE ESTERASE UR-ACNC: NEGATIVE — SIGNIFICANT CHANGE UP
LIDOCAIN IGE QN: 9 U/L — LOW (ref 22–51)
LYMPHOCYTES # BLD AUTO: 16 % — LOW (ref 20–55)
LYMPHOCYTES # BLD AUTO: 3 K/UL — SIGNIFICANT CHANGE UP (ref 1–4.8)
MACROCYTES BLD QL: SLIGHT — SIGNIFICANT CHANGE UP
MCHC RBC-ENTMCNC: 27.4 PG — SIGNIFICANT CHANGE UP (ref 27–31)
MCHC RBC-ENTMCNC: 30.3 G/DL — LOW (ref 32–36)
MCV RBC AUTO: 90.4 FL — SIGNIFICANT CHANGE UP (ref 81–99)
METAMYELOCYTES # FLD: 1 % — HIGH (ref 0–0)
MONOCYTES # BLD AUTO: 0.6 K/UL — SIGNIFICANT CHANGE UP (ref 0–0.8)
MONOCYTES NFR BLD AUTO: 4 % — SIGNIFICANT CHANGE UP (ref 3–10)
NEUTROPHILS # BLD AUTO: 16 K/UL — HIGH (ref 1.8–8)
NEUTROPHILS NFR BLD AUTO: 77 % — HIGH (ref 37–73)
NEUTS BAND # BLD: 2 % — SIGNIFICANT CHANGE UP (ref 0–8)
NITRITE UR-MCNC: NEGATIVE — SIGNIFICANT CHANGE UP
NT-PROBNP SERPL-SCNC: 53 PG/ML — SIGNIFICANT CHANGE UP (ref 0–300)
OVALOCYTES BLD QL SMEAR: SLIGHT — SIGNIFICANT CHANGE UP
PH UR: 6 — SIGNIFICANT CHANGE UP (ref 5–8)
PLAT MORPH BLD: NORMAL — SIGNIFICANT CHANGE UP
PLATELET # BLD AUTO: 711 K/UL — HIGH (ref 150–400)
POIKILOCYTOSIS BLD QL AUTO: SLIGHT — SIGNIFICANT CHANGE UP
POTASSIUM SERPL-MCNC: 6.8 MMOL/L — CRITICAL HIGH (ref 3.5–5.3)
POTASSIUM SERPL-SCNC: 6.8 MMOL/L — CRITICAL HIGH (ref 3.5–5.3)
PROT UR-MCNC: 30 MG/DL
PROTHROM AB SERPL-ACNC: 11.5 SEC — SIGNIFICANT CHANGE UP (ref 9.8–12.7)
RBC # BLD: 4.46 M/UL — SIGNIFICANT CHANGE UP (ref 4.4–5.2)
RBC # FLD: 19.1 % — HIGH (ref 11–15.6)
RBC BLD AUTO: ABNORMAL
RBC CASTS # UR COMP ASSIST: SIGNIFICANT CHANGE UP /HPF (ref 0–4)
SODIUM SERPL-SCNC: 138 MMOL/L — SIGNIFICANT CHANGE UP (ref 135–145)
SP GR SPEC: 1.01 — SIGNIFICANT CHANGE UP (ref 1.01–1.02)
UROBILINOGEN FLD QL: NEGATIVE MG/DL — SIGNIFICANT CHANGE UP
WBC # BLD: 19.9 K/UL — HIGH (ref 4.8–10.8)
WBC # FLD AUTO: 19.9 K/UL — HIGH (ref 4.8–10.8)
WBC UR QL: SIGNIFICANT CHANGE UP

## 2018-05-17 PROCEDURE — 71045 X-RAY EXAM CHEST 1 VIEW: CPT | Mod: 26

## 2018-05-17 PROCEDURE — 99291 CRITICAL CARE FIRST HOUR: CPT

## 2018-05-17 RX ORDER — ONDANSETRON 8 MG/1
4 TABLET, FILM COATED ORAL ONCE
Qty: 0 | Refills: 0 | Status: COMPLETED | OUTPATIENT
Start: 2018-05-17 | End: 2018-05-17

## 2018-05-17 RX ORDER — SODIUM BICARBONATE 1 MEQ/ML
50 SYRINGE (ML) INTRAVENOUS ONCE
Qty: 0 | Refills: 0 | Status: COMPLETED | OUTPATIENT
Start: 2018-05-17 | End: 2018-05-17

## 2018-05-17 RX ORDER — PANTOPRAZOLE SODIUM 20 MG/1
40 TABLET, DELAYED RELEASE ORAL DAILY
Qty: 0 | Refills: 0 | Status: DISCONTINUED | OUTPATIENT
Start: 2018-05-17 | End: 2018-05-20

## 2018-05-17 RX ORDER — SODIUM CHLORIDE 9 MG/ML
1000 INJECTION, SOLUTION INTRAVENOUS
Qty: 0 | Refills: 0 | Status: DISCONTINUED | OUTPATIENT
Start: 2018-05-17 | End: 2018-05-18

## 2018-05-17 RX ORDER — CALCIUM GLUCONATE 100 MG/ML
2 VIAL (ML) INTRAVENOUS ONCE
Qty: 0 | Refills: 0 | Status: COMPLETED | OUTPATIENT
Start: 2018-05-17 | End: 2018-05-17

## 2018-05-17 RX ORDER — INSULIN HUMAN 100 [IU]/ML
10 INJECTION, SOLUTION SUBCUTANEOUS ONCE
Qty: 0 | Refills: 0 | Status: COMPLETED | OUTPATIENT
Start: 2018-05-17 | End: 2018-05-17

## 2018-05-17 RX ORDER — SODIUM CHLORIDE 9 MG/ML
2000 INJECTION INTRAMUSCULAR; INTRAVENOUS; SUBCUTANEOUS ONCE
Qty: 0 | Refills: 0 | Status: COMPLETED | OUTPATIENT
Start: 2018-05-17 | End: 2018-05-17

## 2018-05-17 RX ORDER — INSULIN HUMAN 100 [IU]/ML
2 INJECTION, SOLUTION SUBCUTANEOUS
Qty: 100 | Refills: 0 | Status: DISCONTINUED | OUTPATIENT
Start: 2018-05-17 | End: 2018-05-19

## 2018-05-17 RX ADMIN — INSULIN HUMAN 10 UNIT(S): 100 INJECTION, SOLUTION SUBCUTANEOUS at 22:20

## 2018-05-17 RX ADMIN — SODIUM CHLORIDE 250 MILLILITER(S): 9 INJECTION, SOLUTION INTRAVENOUS at 22:41

## 2018-05-17 RX ADMIN — Medication 50 MILLIEQUIVALENT(S): at 22:40

## 2018-05-17 RX ADMIN — ONDANSETRON 4 MILLIGRAM(S): 8 TABLET, FILM COATED ORAL at 21:40

## 2018-05-17 RX ADMIN — INSULIN HUMAN 5 UNIT(S)/HR: 100 INJECTION, SOLUTION SUBCUTANEOUS at 22:22

## 2018-05-17 RX ADMIN — SODIUM CHLORIDE 2666.67 MILLILITER(S): 9 INJECTION INTRAMUSCULAR; INTRAVENOUS; SUBCUTANEOUS at 21:40

## 2018-05-17 NOTE — ED PROVIDER NOTE - ENMT, MLM
Airway patent, Nasal mucosa clear. Mouth with dry mucosa. Throat has no vesicles, no oropharyngeal exudates and uvula is midline. Airway patent, Nasal mucosa clear. Mouth with dry mucosa.

## 2018-05-17 NOTE — H&P ADULT - ATTENDING COMMENTS
I have seen and evaluated the patient and agree with the above assessment and plan with the following additions:    - DKA - serial lytes, replete as tolerated  - IV fluids for hydration  - once gap closes transition to SQ insulin

## 2018-05-17 NOTE — ED PROVIDER NOTE - OBJECTIVE STATEMENT
16 y/o F pt with hx of DM and asthma BIBA to ED for feeling unwell x 4 hours. When EMS arrived, pt was found to be hyperglycemic, glucometer read high and pt was tachypneic. 16 y/o F pt with hx of DM and asthma BIBA to ED for feeling unwell x 4 hours. When EMS arrived, pt was found to be hyperglycemic, glucometer read high and pt was tachypneic.   Hx otherwise limited due to patient distress  Hx of prior admissions for DKA

## 2018-05-17 NOTE — H&P ADULT - HISTORY OF PRESENT ILLNESS
17F w/ PMH of type-1 DM, asthma (proventil only) and 1 , presents with several day Hx of "not feeling well," and nausea/vomitting.  Patient's ABG revealed a pH of 6.8, and HCO3 of 9. LArge ketones noted in UA. BMP currently pending  -sugar undetecatable on fingers tick, showed 730 on ABG  -so far in ER has been given 10 unitsd insulin bolus, and then satted on insulin drip.  -also has recieved 3 liters of fluid  -1 amp of bicarb pushed by ER as pH <7.  -On arrival to ER patient is sitting up in bed, tachypneic, kussmaul type breathing.  3 PIVs in place, fluids running wide open.  -she is able to speak and tell me her Hx, requesting water  -will be admitted to MICU for severe DKA  -she denies sick contacts, recent illness, she claims to be compliant with her insulin, but states she has "lost track: recently, because she has not been feeling well, and has been busy with school and her child.

## 2018-05-17 NOTE — ED ADULT NURSE NOTE - OBJECTIVE STATEMENT
Code team called to bedside. Pt Kussmaul breathing AMS, nauseous. Family states he found her like this. PERRLA. Lungs CTA, Ab soft non tender, + bowel sounds x 4quads. Denies Nausea, Vomiting, Diarrhea. Skin warm, dry, color appropriate for age and race.

## 2018-05-17 NOTE — H&P ADULT - ASSESSMENT
17F type 1 DM, asthma, now with severe4 DKA      45 minutes of critical care time spent evaluating/treating patient, reviewing labs, and discussing care with bedside team and patient

## 2018-05-17 NOTE — ED ADULT NURSE NOTE - CHIEF COMPLAINT QUOTE
pt comes to ed from home for change in mental status. patient tachypneic, tachycardiac with high blood sugar. patient hx dka. dr gato ed la garzaitely to bedside. has line from ems receiving 1 liter. code team to bedside. unable to speak.

## 2018-05-17 NOTE — H&P ADULT - PROBLEM SELECTOR PLAN 1
-admit to MICU  -On insulin drip, titrate per MICU protocol  - Q1H accuchecks  -aggressive fluid hydration, goal UOP >0.5 cc/kg/hr  -Q4-6H BMP, magnesium, phosphorous level, acetone  -when blood sugar <250 add dextrose to IVF, consider change to D5 1/2 NS   -when BMP shows closure of anion gap, add long acting insulin (lantus)  -Once lantus given, continue insulin drip for 1 hour, and then begin using sliding scale insulin w/ meals and at bedtime or Q6H if remains NPO  -if patient able to tolerate PO start on diet, if not continue IVF at low rate  -Diabetic education and counseling  -send HbA1C

## 2018-05-17 NOTE — ED PROVIDER NOTE - CARE PLAN
Principal Discharge DX:	Diabetic ketoacidosis without coma associated with type 1 diabetes mellitus  Secondary Diagnosis:	Metabolic acidosis

## 2018-05-18 DIAGNOSIS — E10.10 TYPE 1 DIABETES MELLITUS WITH KETOACIDOSIS WITHOUT COMA: ICD-10-CM

## 2018-05-18 LAB
ACETONE SERPL-MCNC: ABNORMAL
ACETONE SERPL-MCNC: ABNORMAL
ANION GAP SERPL CALC-SCNC: 19 MMOL/L — HIGH (ref 5–17)
ANION GAP SERPL CALC-SCNC: 19 MMOL/L — HIGH (ref 5–17)
ANION GAP SERPL CALC-SCNC: 26 MMOL/L — HIGH (ref 5–17)
ANION GAP SERPL CALC-SCNC: >32 MMOL/L — HIGH (ref 5–17)
BASE EXCESS BLDV CALC-SCNC: -14.5 MMOL/L — LOW (ref -2–2)
BASE EXCESS BLDV CALC-SCNC: -26 MMOL/L — LOW (ref -2–2)
BUN SERPL-MCNC: 10 MG/DL — SIGNIFICANT CHANGE UP (ref 8–20)
BUN SERPL-MCNC: 14 MG/DL — SIGNIFICANT CHANGE UP (ref 8–20)
BUN SERPL-MCNC: 17 MG/DL — SIGNIFICANT CHANGE UP (ref 8–20)
BUN SERPL-MCNC: 7 MG/DL — LOW (ref 8–20)
CA-I SERPL-SCNC: 1.07 MMOL/L — LOW (ref 1.15–1.33)
CA-I SERPL-SCNC: 1.11 MMOL/L — LOW (ref 1.15–1.33)
CALCIUM SERPL-MCNC: 8.2 MG/DL — LOW (ref 8.6–10.2)
CALCIUM SERPL-MCNC: 8.2 MG/DL — LOW (ref 8.6–10.2)
CALCIUM SERPL-MCNC: 9.3 MG/DL — SIGNIFICANT CHANGE UP (ref 8.6–10.2)
CALCIUM SERPL-MCNC: 9.5 MG/DL — SIGNIFICANT CHANGE UP (ref 8.6–10.2)
CHLORIDE BLDV-SCNC: 118 MMOL/L — HIGH (ref 98–107)
CHLORIDE BLDV-SCNC: 120 MMOL/L — HIGH (ref 98–107)
CHLORIDE SERPL-SCNC: 104 MMOL/L — SIGNIFICANT CHANGE UP (ref 98–107)
CHLORIDE SERPL-SCNC: 104 MMOL/L — SIGNIFICANT CHANGE UP (ref 98–107)
CHLORIDE SERPL-SCNC: 106 MMOL/L — SIGNIFICANT CHANGE UP (ref 98–107)
CHLORIDE SERPL-SCNC: 111 MMOL/L — HIGH (ref 98–107)
CO2 SERPL-SCNC: 11 MMOL/L — LOW (ref 22–29)
CO2 SERPL-SCNC: 14 MMOL/L — LOW (ref 22–29)
CO2 SERPL-SCNC: 15 MMOL/L — LOW (ref 22–29)
CO2 SERPL-SCNC: <6 MMOL/L — CRITICAL LOW (ref 22–29)
CREAT SERPL-MCNC: 0.66 MG/DL — SIGNIFICANT CHANGE UP (ref 0.5–1.3)
CREAT SERPL-MCNC: 0.71 MG/DL — SIGNIFICANT CHANGE UP (ref 0.5–1.3)
CREAT SERPL-MCNC: 0.8 MG/DL — SIGNIFICANT CHANGE UP (ref 0.5–1.3)
CREAT SERPL-MCNC: 0.92 MG/DL — SIGNIFICANT CHANGE UP (ref 0.5–1.3)
CULTURE RESULTS: SIGNIFICANT CHANGE UP
GAS PNL BLDV: 149 MMOL/L — HIGH (ref 135–145)
GAS PNL BLDV: 150 MMOL/L — HIGH (ref 135–145)
GAS PNL BLDV: SIGNIFICANT CHANGE UP
GLUCOSE BLDC GLUCOMTR-MCNC: 102 MG/DL — HIGH (ref 70–99)
GLUCOSE BLDC GLUCOMTR-MCNC: 108 MG/DL — HIGH (ref 70–99)
GLUCOSE BLDC GLUCOMTR-MCNC: 114 MG/DL — HIGH (ref 70–99)
GLUCOSE BLDC GLUCOMTR-MCNC: 119 MG/DL — HIGH (ref 70–99)
GLUCOSE BLDC GLUCOMTR-MCNC: 140 MG/DL — HIGH (ref 70–99)
GLUCOSE BLDC GLUCOMTR-MCNC: 143 MG/DL — HIGH (ref 70–99)
GLUCOSE BLDC GLUCOMTR-MCNC: 187 MG/DL — HIGH (ref 70–99)
GLUCOSE BLDC GLUCOMTR-MCNC: 272 MG/DL — HIGH (ref 70–99)
GLUCOSE BLDC GLUCOMTR-MCNC: 372 MG/DL — HIGH (ref 70–99)
GLUCOSE BLDC GLUCOMTR-MCNC: 77 MG/DL — SIGNIFICANT CHANGE UP (ref 70–99)
GLUCOSE BLDC GLUCOMTR-MCNC: 79 MG/DL — SIGNIFICANT CHANGE UP (ref 70–99)
GLUCOSE BLDC GLUCOMTR-MCNC: 88 MG/DL — SIGNIFICANT CHANGE UP (ref 70–99)
GLUCOSE BLDC GLUCOMTR-MCNC: 89 MG/DL — SIGNIFICANT CHANGE UP (ref 70–99)
GLUCOSE BLDC GLUCOMTR-MCNC: 96 MG/DL — SIGNIFICANT CHANGE UP (ref 70–99)
GLUCOSE BLDC GLUCOMTR-MCNC: 99 MG/DL — SIGNIFICANT CHANGE UP (ref 70–99)
GLUCOSE BLDV-MCNC: 111 MG/DL — HIGH (ref 70–99)
GLUCOSE BLDV-MCNC: 377 MG/DL — HIGH (ref 70–99)
GLUCOSE SERPL-MCNC: 106 MG/DL — SIGNIFICANT CHANGE UP (ref 70–115)
GLUCOSE SERPL-MCNC: 116 MG/DL — HIGH (ref 70–115)
GLUCOSE SERPL-MCNC: 126 MG/DL — HIGH (ref 70–115)
GLUCOSE SERPL-MCNC: 370 MG/DL — HIGH (ref 70–115)
HBA1C BLD-MCNC: 12.8 % — HIGH (ref 4–5.6)
HCO3 BLDV-SCNC: 13 MMOL/L — LOW (ref 21–29)
HCO3 BLDV-SCNC: 7 MMOL/L — LOW (ref 21–29)
HCT VFR BLD CALC: 32.5 % — LOW (ref 37–47)
HCT VFR BLDA CALC: 32 — LOW (ref 39–50)
HCT VFR BLDA CALC: 37 — LOW (ref 39–50)
HGB BLD CALC-MCNC: 10.4 G/DL — LOW (ref 11.5–15.5)
HGB BLD CALC-MCNC: 12 G/DL — SIGNIFICANT CHANGE UP (ref 11.5–15.5)
HGB BLD-MCNC: 10.6 G/DL — LOW (ref 12–16)
LACTATE BLDV-MCNC: 1.3 MMOL/L — SIGNIFICANT CHANGE UP (ref 0.5–2)
LACTATE BLDV-MCNC: 2.7 MMOL/L — HIGH (ref 0.5–2)
MAGNESIUM SERPL-MCNC: 1.7 MG/DL — SIGNIFICANT CHANGE UP (ref 1.6–2.6)
MAGNESIUM SERPL-MCNC: 2 MG/DL — SIGNIFICANT CHANGE UP (ref 1.6–2.6)
MAGNESIUM SERPL-MCNC: 2 MG/DL — SIGNIFICANT CHANGE UP (ref 1.6–2.6)
MAGNESIUM SERPL-MCNC: 2.2 MG/DL — SIGNIFICANT CHANGE UP (ref 1.6–2.6)
MCHC RBC-ENTMCNC: 27.5 PG — SIGNIFICANT CHANGE UP (ref 27–31)
MCHC RBC-ENTMCNC: 32.6 G/DL — SIGNIFICANT CHANGE UP (ref 32–36)
MCV RBC AUTO: 84.2 FL — SIGNIFICANT CHANGE UP (ref 81–99)
OTHER CELLS CSF MANUAL: 12 ML/DL — LOW (ref 18–22)
OTHER CELLS CSF MANUAL: 14 ML/DL — LOW (ref 18–22)
PCO2 BLDV: 17 MMHG — LOW (ref 35–50)
PCO2 BLDV: 19 MMHG — LOW (ref 35–50)
PH BLDV: 6.98 — CRITICAL LOW (ref 7.32–7.43)
PH BLDV: 7.32 — SIGNIFICANT CHANGE UP (ref 7.32–7.43)
PHOSPHATE SERPL-MCNC: 0.5 MG/DL — CRITICAL LOW (ref 2.4–4.7)
PHOSPHATE SERPL-MCNC: 2.3 MG/DL — LOW (ref 2.4–4.7)
PHOSPHATE SERPL-MCNC: 2.7 MG/DL — SIGNIFICANT CHANGE UP (ref 2.4–4.7)
PHOSPHATE SERPL-MCNC: 3.3 MG/DL — SIGNIFICANT CHANGE UP (ref 2.4–4.7)
PLATELET # BLD AUTO: 567 K/UL — HIGH (ref 150–400)
PO2 BLDV: 47 MMHG — HIGH (ref 25–45)
PO2 BLDV: 73 MMHG — HIGH (ref 25–45)
POTASSIUM BLDV-SCNC: 3.6 MMOL/L — SIGNIFICANT CHANGE UP (ref 3.4–4.5)
POTASSIUM BLDV-SCNC: 5.2 MMOL/L — HIGH (ref 3.4–4.5)
POTASSIUM SERPL-MCNC: 3.3 MMOL/L — LOW (ref 3.5–5.3)
POTASSIUM SERPL-MCNC: 3.6 MMOL/L — SIGNIFICANT CHANGE UP (ref 3.5–5.3)
POTASSIUM SERPL-MCNC: 4 MMOL/L — SIGNIFICANT CHANGE UP (ref 3.5–5.3)
POTASSIUM SERPL-MCNC: 5.3 MMOL/L — SIGNIFICANT CHANGE UP (ref 3.5–5.3)
POTASSIUM SERPL-SCNC: 3.3 MMOL/L — LOW (ref 3.5–5.3)
POTASSIUM SERPL-SCNC: 3.6 MMOL/L — SIGNIFICANT CHANGE UP (ref 3.5–5.3)
POTASSIUM SERPL-SCNC: 4 MMOL/L — SIGNIFICANT CHANGE UP (ref 3.5–5.3)
POTASSIUM SERPL-SCNC: 5.3 MMOL/L — SIGNIFICANT CHANGE UP (ref 3.5–5.3)
RBC # BLD: 3.86 M/UL — LOW (ref 4.4–5.2)
RBC # FLD: 17 % — HIGH (ref 11–15.6)
SAO2 % BLDV: 70 % — SIGNIFICANT CHANGE UP
SAO2 % BLDV: 96 % — SIGNIFICANT CHANGE UP
SODIUM SERPL-SCNC: 137 MMOL/L — SIGNIFICANT CHANGE UP (ref 135–145)
SODIUM SERPL-SCNC: 140 MMOL/L — SIGNIFICANT CHANGE UP (ref 135–145)
SODIUM SERPL-SCNC: 142 MMOL/L — SIGNIFICANT CHANGE UP (ref 135–145)
SODIUM SERPL-SCNC: 148 MMOL/L — HIGH (ref 135–145)
SPECIMEN SOURCE: SIGNIFICANT CHANGE UP
WBC # BLD: 15.9 K/UL — HIGH (ref 4.8–10.8)
WBC # FLD AUTO: 15.9 K/UL — HIGH (ref 4.8–10.8)

## 2018-05-18 PROCEDURE — 99223 1ST HOSP IP/OBS HIGH 75: CPT

## 2018-05-18 RX ORDER — POTASSIUM PHOSPHATE, MONOBASIC POTASSIUM PHOSPHATE, DIBASIC 236; 224 MG/ML; MG/ML
30 INJECTION, SOLUTION INTRAVENOUS ONCE
Qty: 0 | Refills: 0 | Status: COMPLETED | OUTPATIENT
Start: 2018-05-18 | End: 2018-05-18

## 2018-05-18 RX ORDER — MAGNESIUM SULFATE 500 MG/ML
2 VIAL (ML) INJECTION ONCE
Qty: 0 | Refills: 0 | Status: COMPLETED | OUTPATIENT
Start: 2018-05-18 | End: 2018-05-18

## 2018-05-18 RX ORDER — SODIUM BICARBONATE 1 MEQ/ML
100 SYRINGE (ML) INTRAVENOUS ONCE
Qty: 0 | Refills: 0 | Status: COMPLETED | OUTPATIENT
Start: 2018-05-18 | End: 2018-05-18

## 2018-05-18 RX ORDER — SODIUM CHLORIDE 9 MG/ML
1000 INJECTION, SOLUTION INTRAVENOUS ONCE
Qty: 0 | Refills: 0 | Status: COMPLETED | OUTPATIENT
Start: 2018-05-18 | End: 2018-05-18

## 2018-05-18 RX ORDER — POTASSIUM CHLORIDE 20 MEQ
40 PACKET (EA) ORAL ONCE
Qty: 0 | Refills: 0 | Status: COMPLETED | OUTPATIENT
Start: 2018-05-18 | End: 2018-05-18

## 2018-05-18 RX ORDER — SODIUM CHLORIDE 9 MG/ML
1000 INJECTION, SOLUTION INTRAVENOUS
Qty: 0 | Refills: 0 | Status: DISCONTINUED | OUTPATIENT
Start: 2018-05-18 | End: 2018-05-19

## 2018-05-18 RX ORDER — POTASSIUM CHLORIDE 20 MEQ
10 PACKET (EA) ORAL
Qty: 0 | Refills: 0 | Status: COMPLETED | OUTPATIENT
Start: 2018-05-18 | End: 2018-05-18

## 2018-05-18 RX ORDER — ENOXAPARIN SODIUM 100 MG/ML
40 INJECTION SUBCUTANEOUS DAILY
Qty: 0 | Refills: 0 | Status: DISCONTINUED | OUTPATIENT
Start: 2018-05-18 | End: 2018-05-20

## 2018-05-18 RX ORDER — POTASSIUM PHOSPHATE, MONOBASIC POTASSIUM PHOSPHATE, DIBASIC 236; 224 MG/ML; MG/ML
15 INJECTION, SOLUTION INTRAVENOUS ONCE
Qty: 0 | Refills: 0 | Status: COMPLETED | OUTPATIENT
Start: 2018-05-18 | End: 2018-05-18

## 2018-05-18 RX ORDER — ENOXAPARIN SODIUM 100 MG/ML
23 INJECTION SUBCUTANEOUS
Qty: 0 | Refills: 0 | COMMUNITY

## 2018-05-18 RX ORDER — SODIUM BICARBONATE 1 MEQ/ML
100 SYRINGE (ML) INTRAVENOUS ONCE
Qty: 0 | Refills: 0 | Status: DISCONTINUED | OUTPATIENT
Start: 2018-05-18 | End: 2018-05-18

## 2018-05-18 RX ADMIN — SODIUM CHLORIDE 250 MILLILITER(S): 9 INJECTION, SOLUTION INTRAVENOUS at 21:51

## 2018-05-18 RX ADMIN — POTASSIUM PHOSPHATE, MONOBASIC POTASSIUM PHOSPHATE, DIBASIC 62.5 MILLIMOLE(S): 236; 224 INJECTION, SOLUTION INTRAVENOUS at 23:04

## 2018-05-18 RX ADMIN — ENOXAPARIN SODIUM 40 MILLIGRAM(S): 100 INJECTION SUBCUTANEOUS at 12:18

## 2018-05-18 RX ADMIN — PANTOPRAZOLE SODIUM 40 MILLIGRAM(S): 20 TABLET, DELAYED RELEASE ORAL at 12:19

## 2018-05-18 RX ADMIN — Medication 40 MILLIEQUIVALENT(S): at 12:17

## 2018-05-18 RX ADMIN — Medication 40 MILLIEQUIVALENT(S): at 23:04

## 2018-05-18 RX ADMIN — Medication 200 GRAM(S): at 00:28

## 2018-05-18 RX ADMIN — SODIUM CHLORIDE 250 MILLILITER(S): 9 INJECTION, SOLUTION INTRAVENOUS at 06:09

## 2018-05-18 RX ADMIN — SODIUM CHLORIDE 200 MILLILITER(S): 9 INJECTION, SOLUTION INTRAVENOUS at 04:01

## 2018-05-18 RX ADMIN — Medication 100 MILLIEQUIVALENT(S): at 14:21

## 2018-05-18 RX ADMIN — SODIUM CHLORIDE 250 MILLILITER(S): 9 INJECTION, SOLUTION INTRAVENOUS at 14:20

## 2018-05-18 RX ADMIN — Medication 100 MILLIEQUIVALENT(S): at 13:46

## 2018-05-18 RX ADMIN — Medication 100 MILLIEQUIVALENT(S): at 03:00

## 2018-05-18 RX ADMIN — INSULIN HUMAN 2 UNIT(S)/HR: 100 INJECTION, SOLUTION SUBCUTANEOUS at 06:09

## 2018-05-18 RX ADMIN — SODIUM CHLORIDE 1000 MILLILITER(S): 9 INJECTION, SOLUTION INTRAVENOUS at 02:30

## 2018-05-18 RX ADMIN — Medication 100 MILLIEQUIVALENT(S): at 12:18

## 2018-05-18 RX ADMIN — Medication 50 GRAM(S): at 18:55

## 2018-05-18 RX ADMIN — POTASSIUM PHOSPHATE, MONOBASIC POTASSIUM PHOSPHATE, DIBASIC 83.33 MILLIMOLE(S): 236; 224 INJECTION, SOLUTION INTRAVENOUS at 12:18

## 2018-05-18 NOTE — ADVANCED PRACTICE NURSE CONSULT - ASSESSMENT
pt is a+ox3 c/o 0 pian. she takes lantus 20 units qhs and humalog 5 before meals. she did nt miss any lNTUS DOSES, BIUT DID A FEW HUMALOG SPECIALY BEFORE COMING INTO TO ER. SHE FU W DR SHI. PT DID NOT SEE DR DONOHUE SINCE SHE WAS DC LAST TIME. PT WAS EDUCATED ABOUT THE IMPORTANCE OF FU W HER ENDOCRINOLOGIST. ALSO DISCUSSED SICK SOFIA AND THE IMPORTANCE TO INCREASE BG MONITRTORIN, HYDRATION AND INSULIN ADMINISTRATION IN ORDER TO CLEAR THE KETONES.

## 2018-05-19 LAB
ACETONE SERPL-MCNC: ABNORMAL
ANION GAP SERPL CALC-SCNC: 14 MMOL/L — SIGNIFICANT CHANGE UP (ref 5–17)
ANION GAP SERPL CALC-SCNC: 17 MMOL/L — SIGNIFICANT CHANGE UP (ref 5–17)
BUN SERPL-MCNC: 4 MG/DL — LOW (ref 8–20)
BUN SERPL-MCNC: <3 MG/DL — LOW (ref 8–20)
CALCIUM SERPL-MCNC: 8.4 MG/DL — LOW (ref 8.6–10.2)
CALCIUM SERPL-MCNC: 8.6 MG/DL — SIGNIFICANT CHANGE UP (ref 8.6–10.2)
CHLORIDE SERPL-SCNC: 105 MMOL/L — SIGNIFICANT CHANGE UP (ref 98–107)
CHLORIDE SERPL-SCNC: 111 MMOL/L — HIGH (ref 98–107)
CO2 SERPL-SCNC: 15 MMOL/L — LOW (ref 22–29)
CO2 SERPL-SCNC: 16 MMOL/L — LOW (ref 22–29)
CREAT SERPL-MCNC: 0.54 MG/DL — SIGNIFICANT CHANGE UP (ref 0.5–1.3)
CREAT SERPL-MCNC: 0.62 MG/DL — SIGNIFICANT CHANGE UP (ref 0.5–1.3)
GLUCOSE BLDC GLUCOMTR-MCNC: 105 MG/DL — HIGH (ref 70–99)
GLUCOSE BLDC GLUCOMTR-MCNC: 108 MG/DL — HIGH (ref 70–99)
GLUCOSE BLDC GLUCOMTR-MCNC: 109 MG/DL — HIGH (ref 70–99)
GLUCOSE BLDC GLUCOMTR-MCNC: 132 MG/DL — HIGH (ref 70–99)
GLUCOSE BLDC GLUCOMTR-MCNC: 134 MG/DL — HIGH (ref 70–99)
GLUCOSE BLDC GLUCOMTR-MCNC: 136 MG/DL — HIGH (ref 70–99)
GLUCOSE BLDC GLUCOMTR-MCNC: 151 MG/DL — HIGH (ref 70–99)
GLUCOSE BLDC GLUCOMTR-MCNC: 215 MG/DL — HIGH (ref 70–99)
GLUCOSE BLDC GLUCOMTR-MCNC: 219 MG/DL — HIGH (ref 70–99)
GLUCOSE BLDC GLUCOMTR-MCNC: 239 MG/DL — HIGH (ref 70–99)
GLUCOSE BLDC GLUCOMTR-MCNC: 300 MG/DL — HIGH (ref 70–99)
GLUCOSE SERPL-MCNC: 107 MG/DL — SIGNIFICANT CHANGE UP (ref 70–115)
GLUCOSE SERPL-MCNC: 204 MG/DL — HIGH (ref 70–115)
HCT VFR BLD CALC: 30.1 % — LOW (ref 37–47)
HGB BLD-MCNC: 9.6 G/DL — LOW (ref 12–16)
MAGNESIUM SERPL-MCNC: 1.9 MG/DL — SIGNIFICANT CHANGE UP (ref 1.6–2.6)
MAGNESIUM SERPL-MCNC: 2.1 MG/DL — SIGNIFICANT CHANGE UP (ref 1.6–2.6)
MCHC RBC-ENTMCNC: 26.4 PG — LOW (ref 27–31)
MCHC RBC-ENTMCNC: 31.9 G/DL — LOW (ref 32–36)
MCV RBC AUTO: 82.9 FL — SIGNIFICANT CHANGE UP (ref 81–99)
PHOSPHATE SERPL-MCNC: 1.7 MG/DL — LOW (ref 2.4–4.7)
PHOSPHATE SERPL-MCNC: 2.8 MG/DL — SIGNIFICANT CHANGE UP (ref 2.4–4.7)
PLATELET # BLD AUTO: 444 K/UL — HIGH (ref 150–400)
POTASSIUM SERPL-MCNC: 3.4 MMOL/L — LOW (ref 3.5–5.3)
POTASSIUM SERPL-MCNC: 4.4 MMOL/L — SIGNIFICANT CHANGE UP (ref 3.5–5.3)
POTASSIUM SERPL-SCNC: 3.4 MMOL/L — LOW (ref 3.5–5.3)
POTASSIUM SERPL-SCNC: 4.4 MMOL/L — SIGNIFICANT CHANGE UP (ref 3.5–5.3)
RBC # BLD: 3.63 M/UL — LOW (ref 4.4–5.2)
RBC # FLD: 17.6 % — HIGH (ref 11–15.6)
SODIUM SERPL-SCNC: 137 MMOL/L — SIGNIFICANT CHANGE UP (ref 135–145)
SODIUM SERPL-SCNC: 141 MMOL/L — SIGNIFICANT CHANGE UP (ref 135–145)
WBC # BLD: 6 K/UL — SIGNIFICANT CHANGE UP (ref 4.8–10.8)
WBC # FLD AUTO: 6 K/UL — SIGNIFICANT CHANGE UP (ref 4.8–10.8)

## 2018-05-19 PROCEDURE — 99233 SBSQ HOSP IP/OBS HIGH 50: CPT

## 2018-05-19 PROCEDURE — 99231 SBSQ HOSP IP/OBS SF/LOW 25: CPT

## 2018-05-19 RX ORDER — SODIUM CHLORIDE 9 MG/ML
1000 INJECTION, SOLUTION INTRAVENOUS
Qty: 0 | Refills: 0 | Status: DISCONTINUED | OUTPATIENT
Start: 2018-05-19 | End: 2018-05-20

## 2018-05-19 RX ORDER — DEXTROSE 50 % IN WATER 50 %
12.5 SYRINGE (ML) INTRAVENOUS ONCE
Qty: 0 | Refills: 0 | Status: DISCONTINUED | OUTPATIENT
Start: 2018-05-19 | End: 2018-05-20

## 2018-05-19 RX ORDER — DEXTROSE 50 % IN WATER 50 %
25 SYRINGE (ML) INTRAVENOUS ONCE
Qty: 0 | Refills: 0 | Status: DISCONTINUED | OUTPATIENT
Start: 2018-05-19 | End: 2018-05-20

## 2018-05-19 RX ORDER — INSULIN GLARGINE 100 [IU]/ML
10 INJECTION, SOLUTION SUBCUTANEOUS ONCE
Qty: 0 | Refills: 0 | Status: COMPLETED | OUTPATIENT
Start: 2018-05-19 | End: 2018-05-19

## 2018-05-19 RX ORDER — INSULIN GLARGINE 100 [IU]/ML
15 INJECTION, SOLUTION SUBCUTANEOUS AT BEDTIME
Qty: 0 | Refills: 0 | Status: DISCONTINUED | OUTPATIENT
Start: 2018-05-19 | End: 2018-05-19

## 2018-05-19 RX ORDER — INSULIN LISPRO 100/ML
5 VIAL (ML) SUBCUTANEOUS
Qty: 0 | Refills: 0 | Status: DISCONTINUED | OUTPATIENT
Start: 2018-05-19 | End: 2018-05-20

## 2018-05-19 RX ORDER — POTASSIUM CHLORIDE 20 MEQ
20 PACKET (EA) ORAL
Qty: 0 | Refills: 0 | Status: COMPLETED | OUTPATIENT
Start: 2018-05-19 | End: 2018-05-19

## 2018-05-19 RX ORDER — GLUCAGON INJECTION, SOLUTION 0.5 MG/.1ML
1 INJECTION, SOLUTION SUBCUTANEOUS ONCE
Qty: 0 | Refills: 0 | Status: DISCONTINUED | OUTPATIENT
Start: 2018-05-19 | End: 2018-05-20

## 2018-05-19 RX ORDER — POTASSIUM PHOSPHATE, MONOBASIC POTASSIUM PHOSPHATE, DIBASIC 236; 224 MG/ML; MG/ML
15 INJECTION, SOLUTION INTRAVENOUS ONCE
Qty: 0 | Refills: 0 | Status: COMPLETED | OUTPATIENT
Start: 2018-05-19 | End: 2018-05-19

## 2018-05-19 RX ORDER — DEXTROSE MONOHYDRATE, SODIUM CHLORIDE, AND POTASSIUM CHLORIDE 50; .745; 4.5 G/1000ML; G/1000ML; G/1000ML
1000 INJECTION, SOLUTION INTRAVENOUS
Qty: 0 | Refills: 0 | Status: DISCONTINUED | OUTPATIENT
Start: 2018-05-19 | End: 2018-05-19

## 2018-05-19 RX ORDER — INSULIN GLARGINE 100 [IU]/ML
25 INJECTION, SOLUTION SUBCUTANEOUS AT BEDTIME
Qty: 0 | Refills: 0 | Status: DISCONTINUED | OUTPATIENT
Start: 2018-05-20 | End: 2018-05-20

## 2018-05-19 RX ORDER — SODIUM,POTASSIUM PHOSPHATES 278-250MG
1 POWDER IN PACKET (EA) ORAL ONCE
Qty: 0 | Refills: 0 | Status: COMPLETED | OUTPATIENT
Start: 2018-05-19 | End: 2018-05-19

## 2018-05-19 RX ORDER — DEXTROSE 50 % IN WATER 50 %
15 SYRINGE (ML) INTRAVENOUS ONCE
Qty: 0 | Refills: 0 | Status: DISCONTINUED | OUTPATIENT
Start: 2018-05-19 | End: 2018-05-20

## 2018-05-19 RX ORDER — INSULIN GLARGINE 100 [IU]/ML
15 INJECTION, SOLUTION SUBCUTANEOUS ONCE
Qty: 0 | Refills: 0 | Status: COMPLETED | OUTPATIENT
Start: 2018-05-19 | End: 2018-05-19

## 2018-05-19 RX ADMIN — INSULIN GLARGINE 10 UNIT(S): 100 INJECTION, SOLUTION SUBCUTANEOUS at 10:18

## 2018-05-19 RX ADMIN — SODIUM CHLORIDE 250 MILLILITER(S): 9 INJECTION, SOLUTION INTRAVENOUS at 01:36

## 2018-05-19 RX ADMIN — Medication 20 MILLIEQUIVALENT(S): at 05:11

## 2018-05-19 RX ADMIN — INSULIN GLARGINE 15 UNIT(S): 100 INJECTION, SOLUTION SUBCUTANEOUS at 17:25

## 2018-05-19 RX ADMIN — Medication 1 PACKET(S): at 17:25

## 2018-05-19 RX ADMIN — Medication 20 MILLIEQUIVALENT(S): at 09:35

## 2018-05-19 RX ADMIN — POTASSIUM PHOSPHATE, MONOBASIC POTASSIUM PHOSPHATE, DIBASIC 62.5 MILLIMOLE(S): 236; 224 INJECTION, SOLUTION INTRAVENOUS at 17:25

## 2018-05-19 RX ADMIN — INSULIN HUMAN 2 UNIT(S)/HR: 100 INJECTION, SOLUTION SUBCUTANEOUS at 01:36

## 2018-05-19 RX ADMIN — Medication 20 MILLIEQUIVALENT(S): at 07:51

## 2018-05-19 RX ADMIN — DEXTROSE MONOHYDRATE, SODIUM CHLORIDE, AND POTASSIUM CHLORIDE 250 MILLILITER(S): 50; .745; 4.5 INJECTION, SOLUTION INTRAVENOUS at 05:11

## 2018-05-19 RX ADMIN — Medication 5 UNIT(S): at 17:39

## 2018-05-19 RX ADMIN — PANTOPRAZOLE SODIUM 40 MILLIGRAM(S): 20 TABLET, DELAYED RELEASE ORAL at 14:59

## 2018-05-19 RX ADMIN — ENOXAPARIN SODIUM 40 MILLIGRAM(S): 100 INJECTION SUBCUTANEOUS at 14:59

## 2018-05-19 NOTE — PROGRESS NOTE ADULT - SUBJECTIVE AND OBJECTIVE BOX
Patient is a 17y old  Female who presents with a chief complaint of DKA (17 May 2018 22:50)      BRIEF HOSPITAL COURSE:     17F admitted with severe DKA    Events last 24 hours:   -GAP remains open at 19  -remains on insulin gtt    PAST MEDICAL & SURGICAL HISTORY:  Diabetes: type I  Asthma   delivery delivered      Review of Systems:  CONSTITUTIONAL: No fever, chills, or fatigue  EYES: No eye pain, visual disturbances, or discharge  ENMT:  No difficulty hearing, tinnitus, vertigo; No sinus or throat pain  NECK: No pain or stiffness  RESPIRATORY: No cough, wheezing, chills or hemoptysis; No shortness of breath  CARDIOVASCULAR: No chest pain, palpitations, dizziness, or leg swelling  GASTROINTESTINAL: No abdominal or epigastric pain. No nausea, vomiting, or hematemesis; No diarrhea or constipation. No melena or hematochezia.  GENITOURINARY: No dysuria, frequency, hematuria, or incontinence  NEUROLOGICAL: No headaches, memory loss, loss of strength, numbness, or tremors  SKIN: No itching, burning, rashes, or lesions   MUSCULOSKELETAL: No joint pain or swelling; No muscle, back, or extremity pain  PSYCHIATRIC: No depression, anxiety, mood swings, or difficulty sleeping      Medications:            enoxaparin Injectable 40 milliGRAM(s) SubCutaneous daily    pantoprazole  Injectable 40 milliGRAM(s) IV Push daily      insulin Infusion 2 Unit(s)/Hr IV Continuous <Continuous>    dextrose 5% + sodium chloride 0.45%. 1000 milliLiter(s) IV Continuous <Continuous>                ICU Vital Signs Last 24 Hrs  T(C): 37.4 (19 May 2018 00:00), Max: 37.4 (19 May 2018 00:00)  T(F): 99.4 (19 May 2018 00:00), Max: 99.4 (19 May 2018 00:00)  HR: 95 (19 May 2018 01:00) (95 - 125)  BP: 109/68 (19 May 2018 01:00) (93/57 - 123/81)  BP(mean): 84 (19 May 2018 01:00) (70 - 98)  ABP: --  ABP(mean): --  RR: 17 (19 May 2018 01:00) (13 - 22)  SpO2: 100% (19 May 2018 01:00) (100% - 100%)      ABG - ( 17 May 2018 21:52 )  pH, Arterial: 6.84  pH, Blood: x     /  pCO2: 9     /  pO2: 152   / HCO3: 4.4   / Base Excess: -30.8 /  SaO2: 97                  I&O's Detail    17 May 2018 07:01  -  18 May 2018 07:00  --------------------------------------------------------  IN:    dextrose 5% + sodium chloride 0.45%.: 650 mL    insulin Infusion: 26 mL    multiple electrolytes Injection Type 1multiple electrolytes Injection Type 1: 1250 mL    Oral Fluid: 100 mL  Total IN: 2026 mL    OUT:    Voided: 1250 mL  Total OUT: 1250 mL    Total NET: 776 mL      18 May 2018 07:01  -  19 May 2018 03:50  --------------------------------------------------------  IN:    dextrose 5% + sodium chloride 0.45%.: 4508 mL    insulin Infusion: 31 mL    Oral Fluid: 200 mL    Solution: 300 mL    Solution: 50 mL    Solution: 604 mL  Total IN: 5693 mL    OUT:    Voided: 850 mL  Total OUT: 850 mL    Total NET: 4843 mL            LABS:                        10.6   15.9  )-----------( 567      ( 18 May 2018 06:27 )             32.5     05-18    137  |  104  |  7.0<L>  ----------------------------<  126<H>  3.3<L>   |  14.0<L>  |  0.66    Ca    8.2<L>      18 May 2018 22:10  Phos  2.3     -  Mg     2.0                 CAPILLARY BLOOD GLUCOSE      POCT Blood Glucose.: 105 mg/dL (19 May 2018 02:43)    PT/INR - ( 17 May 2018 21:45 )   PT: 11.5 sec;   INR: 1.04 ratio         PTT - ( 17 May 2018 21:45 )  PTT:28.4 sec  Urinalysis Basic - ( 17 May 2018 22:06 )    Color: Yellow / Appearance: Clear / S.015 / pH: x  Gluc: x / Ketone: Large  / Bili: Negative / Urobili: Negative mg/dL   Blood: x / Protein: 30 mg/dL / Nitrite: Negative   Leuk Esterase: Negative / RBC: 0-2 /HPF / WBC 0-2   Sq Epi: x / Non Sq Epi: Occasional / Bacteria: Occasional      CULTURES:  Culture Results:   <10,000 CFU/ml Gram Positive Cocci in Clusters (18 @ 22:06)      Physical Examination:    General: No acute distress.  Alert, oriented, interactive, nonfocal    HEENT: Pupils equal, reactive to light.  Symmetric.    PULM: Clear to auscultation bilaterally, no significant sputum production    CVS: Regular rate and rhythm, no murmurs, rubs, or gallops    ABD: Soft, nondistended, nontender, normoactive bowel sounds, no masses    EXT: No edema, nontender    SKIN: Warm and well perfused, no rashes noted.

## 2018-05-19 NOTE — PROGRESS NOTE ADULT - PROBLEM SELECTOR PLAN 1
-On insulin drip, titrate per MICU protocol  - Q1H accuchecks  -aggressively volume resuscitated on admission, now on maintenance fluids  -Q4-6H BMP, magnesium, phosphorous level, acetone  -patient on D5 1/2 NS as blood sugar <180  -when BMP shows closure of anion gap, add long acting insulin (lantus)  -Once lantus given, continue insulin drip for 1 hour, and then begin using sliding scale insulin w/ meals and at bedtime   - patient is able to tolerate PO, allowing ice chips/juice

## 2018-05-19 NOTE — PROGRESS NOTE ADULT - ATTENDING COMMENTS
I have seen and evaluated the patient and agree with the above witht he following additions    dka resolved  start sq insulin  transfer the floor

## 2018-05-19 NOTE — PROGRESS NOTE ADULT - ASSESSMENT
Problem/Plan - 1:  ·  Problem: Diabetic ketoacidosis without coma associated with type 1 diabetes mellitus.   --> resolved  --> bmp in am     Problem/Plan - 2:  ·  Problem: Asthma.  Plan: -PRN albuterol.     3_ hypophosphatemia --> replete    4) cough -> hycodn prn

## 2018-05-19 NOTE — PROGRESS NOTE ADULT - SUBJECTIVE AND OBJECTIVE BOX
SOFIE STATON    [id number]    17y      Female    INTERVAL HPI/OVERNIGHT EVENTS:    patient being seen for dka, dm and med management. patient seen at bedside and complains of persistent cough      REVIEW OF SYSTEMS:    CONSTITUTIONAL: No fever, weight loss, or fatigue  RESPIRATORY: cough   CARDIOVASCULAR: No chest pain, palpitations  GASTROINTESTINAL: No abdominal or epigastric pain. No nausea, vomiting  NEUROLOGICAL: No headaches, memory loss, loss of strength.  MISCELLANEOUS:      Vital Signs Last 24 Hrs  T(C): 37.2 (19 May 2018 19:45), Max: 37.4 (19 May 2018 00:00)  T(F): 98.9 (19 May 2018 19:45), Max: 99.4 (19 May 2018 00:00)  HR: 100 (19 May 2018 19:45) (90 - 111)  BP: 133/94 (19 May 2018 19:45) (96/51 - 133/94)  BP(mean): 109 (19 May 2018 19:45) (68 - 109)  RR: 19 (19 May 2018 19:45) (14 - 53)  SpO2: 100% (19 May 2018 19:45) (98% - 100%)    PHYSICAL EXAM:    GENERAL: NAD,   HEENT: PERRL, +EOMI  NECK: soft, Supple, No JVD,   CHEST/LUNG: Clear to auscultation bilaterally; No wheezing  HEART: S1S2+, Regular rate and rhythm; No murmurs  ABDOMEN: Soft, Nontender, Nondistended; Bowel sounds present  EXTREMITIES:  2+ Peripheral Pulses, No edema  SKIN: No rashes or lesions  NEURO: AAOX3, no focal deficits,   PSYCH: normal mood      LABS:                        9.6    6.0   )-----------( 444      ( 19 May 2018 04:17 )             30.1     05-19    141  |  111<H>  |  <3.0<L>  ----------------------------<  107  4.4   |  16.0<L>  |  0.54    Ca    8.6      19 May 2018 13:53  Phos  1.7     05-19  Mg     1.9     05-19      PT/INR - ( 17 May 2018 21:45 )   PT: 11.5 sec;   INR: 1.04 ratio         PTT - ( 17 May 2018 21:45 )  PTT:28.4 sec  Urinalysis Basic - ( 17 May 2018 22:06 )    Color: Yellow / Appearance: Clear / S.015 / pH: x  Gluc: x / Ketone: Large  / Bili: Negative / Urobili: Negative mg/dL   Blood: x / Protein: 30 mg/dL / Nitrite: Negative   Leuk Esterase: Negative / RBC: 0-2 /HPF / WBC 0-2   Sq Epi: x / Non Sq Epi: Occasional / Bacteria: Occasional          MEDICATIONS  (STANDING):  dextrose 5%. 1000 milliLiter(s) (50 mL/Hr) IV Continuous <Continuous>  dextrose 50% Injectable 12.5 Gram(s) IV Push once  dextrose 50% Injectable 25 Gram(s) IV Push once  dextrose 50% Injectable 25 Gram(s) IV Push once  enoxaparin Injectable 40 milliGRAM(s) SubCutaneous daily  insulin lispro Injectable (HumaLOG) 5 Unit(s) SubCutaneous three times a day before meals  pantoprazole  Injectable 40 milliGRAM(s) IV Push daily    MEDICATIONS  (PRN):  dextrose 40% Gel 15 Gram(s) Oral once PRN Blood Glucose LESS THAN 70 milliGRAM(s)/deciliter  glucagon  Injectable 1 milliGRAM(s) IntraMuscular once PRN Glucose LESS THAN 70 milligrams/deciliter  HYDROcodone/homatropine Syrup 5 milliLiter(s) Oral four times a day PRN Cough      RADIOLOGY & ADDITIONAL TESTS:

## 2018-05-19 NOTE — PROGRESS NOTE ADULT - ASSESSMENT
17F type 1 DM, asthma, now with severe4 DKA      45 minutes of critical care time spent evaluating/treating patient, reviewing labs, and discussing care with bedside team and patient 17F type 1 DM, asthma, now with severe DKA, dehydration

## 2018-05-19 NOTE — DIETITIAN INITIAL EVALUATION ADULT. - PERTINENT LABORATORY DATA
05-19 Na137 mmol/L Glu 204 mg/dL<H> K+ 3.4 mmol/L<L> Cr  0.62 mg/dL BUN 4.0 mg/dL<L> Phos 2.8 mg/dL Alb n/a   PAB n/a

## 2018-05-19 NOTE — PROGRESS NOTE ADULT - PROBLEM SELECTOR PLAN 1
-admit to MICU  -On insulin drip, titrate per MICU protocol  - Q1H accuchecks  -aggressive fluid hydration, goal UOP >0.5 cc/kg/hr  -Q4-6H BMP, magnesium, phosphorous level, acetone  -when blood sugar <250 add dextrose to IVF, consider change to D5 1/2 NS   -when BMP shows closure of anion gap, add long acting insulin (lantus)  -Once lantus given, continue insulin drip for 1 hour, and then begin using sliding scale insulin w/ meals and at bedtime or Q6H if remains NPO  -if patient able to tolerate PO start on diet, if not continue IVF at low rate  -Diabetic education and counseling  -send HbA1C Anion gap closed  Transitioned to Lantus with meal time coverage and SSI coverage  DM education  Has outpt peds endocrine that she can f/u with  Will downgrade from ICU today.

## 2018-05-19 NOTE — PROGRESS NOTE ADULT - SUBJECTIVE AND OBJECTIVE BOX
Patient is a 17y old  Female who presents with a chief complaint of DKA (17 May 2018 22:50)      BRIEF HOSPITAL COURSE: DKA, hypokalemia, dehydration from non-compliance    Events last 24 hours: still on insulin drip    PAST MEDICAL & SURGICAL HISTORY:  Diabetes: type I  Asthma   delivery delivered      Review of Systems:  CONSTITUTIONAL: No fever, chills, or fatigue  EYES: No eye pain, visual disturbances, or discharge  ENMT:  No difficulty hearing, tinnitus, vertigo; No sinus or throat pain  NECK: No pain or stiffness  RESPIRATORY: No cough, wheezing, chills or hemoptysis; No shortness of breath  CARDIOVASCULAR: No chest pain, palpitations, dizziness, or leg swelling  GASTROINTESTINAL: No abdominal or epigastric pain. No nausea, vomiting, or hematemesis; No diarrhea or constipation. No melena or hematochezia.  GENITOURINARY: No dysuria, frequency, hematuria, or incontinence  NEUROLOGICAL: No headaches, memory loss, loss of strength, numbness, or tremors  SKIN: No itching, burning, rashes, or lesions   MUSCULOSKELETAL: No joint pain or swelling; No muscle, back, or extremity pain  PSYCHIATRIC: No depression, anxiety, mood swings, or difficulty sleeping      Medications:            enoxaparin Injectable 40 milliGRAM(s) SubCutaneous daily    pantoprazole  Injectable 40 milliGRAM(s) IV Push daily      dextrose 40% Gel 15 Gram(s) Oral once PRN  dextrose 50% Injectable 12.5 Gram(s) IV Push once  dextrose 50% Injectable 25 Gram(s) IV Push once  dextrose 50% Injectable 25 Gram(s) IV Push once  glucagon  Injectable 1 milliGRAM(s) IntraMuscular once PRN  insulin glargine Injectable (LANTUS) 15 Unit(s) SubCutaneous at bedtime  insulin glargine Injectable (LANTUS) 10 Unit(s) SubCutaneous once  insulin Infusion 2 Unit(s)/Hr IV Continuous <Continuous>    dextrose 5% + sodium chloride 0.45% with potassium chloride 10 mEq/L 1000 milliLiter(s) IV Continuous <Continuous>  dextrose 5% + sodium chloride 0.45%. 1000 milliLiter(s) IV Continuous <Continuous>  dextrose 5%. 1000 milliLiter(s) IV Continuous <Continuous>  potassium chloride    Tablet ER 20 milliEquivalent(s) Oral every 2 hours                ICU Vital Signs Last 24 Hrs  T(C): 36.9 (19 May 2018 08:00), Max: 37.4 (19 May 2018 00:00)  T(F): 98.4 (19 May 2018 08:00), Max: 99.4 (19 May 2018 00:00)  HR: 93 (19 May 2018 07:00) (93 - 111)  BP: 113/72 (19 May 2018 07:00) (93/57 - 128/79)  BP(mean): 86 (19 May 2018 07:00) (68 - 99)  ABP: --  ABP(mean): --  RR: 16 (19 May 2018 07:00) (13 - 22)  SpO2: 100% (19 May 2018 07:00) (100% - 100%)      ABG - ( 17 May 2018 21:52 )  pH, Arterial: 6.84  pH, Blood: x     /  pCO2: 9     /  pO2: 152   / HCO3: 4.4   / Base Excess: -30.8 /  SaO2: 97                  I&O's Detail    18 May 2018 07:01  -  19 May 2018 07:00  --------------------------------------------------------  IN:    dextrose 5% + sodium chloride 0.45% with potassium chloride 10 mEq/L: 750 mL    dextrose 5% + sodium chloride 0.45%.: 5258 mL    insulin Infusion: 41 mL    Oral Fluid: 250 mL    Solution: 300 mL    Solution: 50 mL    Solution: 666.5 mL  Total IN: 7315.5 mL    OUT:    Voided: 2800 mL  Total OUT: 2800 mL    Total NET: 4515.5 mL      19 May 2018 07:01  -  19 May 2018 09:26  --------------------------------------------------------  IN:    dextrose 5% + sodium chloride 0.45% with potassium chloride 10 mEq/L: 250 mL    insulin Infusion: 2 mL  Total IN: 252 mL    OUT:  Total OUT: 0 mL    Total NET: 252 mL            LABS:                        9.6    6.0   )-----------( 444      ( 19 May 2018 04:17 )             30.1     05-19    137  |  105  |  4.0<L>  ----------------------------<  204<H>  3.4<L>   |  15.0<L>  |  0.62    Ca    8.4<L>      19 May 2018 04:17  Phos  2.8       Mg     2.1                 CAPILLARY BLOOD GLUCOSE      POCT Blood Glucose.: 109 mg/dL (19 May 2018 07:47)    PT/INR - ( 17 May 2018 21:45 )   PT: 11.5 sec;   INR: 1.04 ratio         PTT - ( 17 May 2018 21:45 )  PTT:28.4 sec  Urinalysis Basic - ( 17 May 2018 22:06 )    Color: Yellow / Appearance: Clear / S.015 / pH: x  Gluc: x / Ketone: Large  / Bili: Negative / Urobili: Negative mg/dL   Blood: x / Protein: 30 mg/dL / Nitrite: Negative   Leuk Esterase: Negative / RBC: 0-2 /HPF / WBC 0-2   Sq Epi: x / Non Sq Epi: Occasional / Bacteria: Occasional      CULTURES:  Culture Results:   <10,000 CFU/ml Gram Positive Cocci in Clusters (18 @ 22:06)      Physical Examination:    General: No acute distress.  Alert, oriented, interactive, nonfocal    HEENT: Pupils equal, reactive to light.  Symmetric.    PULM: Clear to auscultation bilaterally, no significant sputum production    CVS: Regular rate and rhythm, no murmurs, rubs, or gallops    ABD: Soft, nondistended, nontender, normoactive bowel sounds, no masses    EXT: No edema, nontender    SKIN: Warm and well perfused, no rashes noted.    RADIOLOGY: ***    CRITICAL CARE TIME SPENT: *** Patient is a 17y old  Female who presents with a chief complaint of DKA (17 May 2018 22:50)      BRIEF HOSPITAL COURSE: DKA, hypokalemia, dehydration from non-compliance    Events last 24 hours: Pt doing better, will transition off insulin gtt onto long acting insulin.  Pt has a pediatric endocrinologist that she follows with outpt as our endocrinologist will not see pt until 19 years old.  Sole/DM education aware of pt.    PAST MEDICAL & SURGICAL HISTORY:  Diabetes: type I  Asthma   delivery delivered      Review of Systems:  CONSTITUTIONAL: No fever, chills, or fatigue  EYES: No eye pain, visual disturbances, or discharge  ENMT:  No difficulty hearing, tinnitus, vertigo; No sinus or throat pain  NECK: No pain or stiffness  RESPIRATORY: No cough, wheezing, chills or hemoptysis; No shortness of breath  CARDIOVASCULAR: No chest pain, palpitations, dizziness, or leg swelling  GASTROINTESTINAL: No abdominal or epigastric pain. No nausea, vomiting, or hematemesis; No diarrhea or constipation. No melena or hematochezia.  GENITOURINARY: No dysuria, frequency, hematuria, or incontinence  NEUROLOGICAL: No headaches, memory loss, loss of strength, numbness, or tremors  SKIN: No itching, burning, rashes, or lesions   MUSCULOSKELETAL: No joint pain or swelling; No muscle, back, or extremity pain  PSYCHIATRIC: No depression, anxiety, mood swings, or difficulty sleeping      Medications:            enoxaparin Injectable 40 milliGRAM(s) SubCutaneous daily    pantoprazole  Injectable 40 milliGRAM(s) IV Push daily      dextrose 40% Gel 15 Gram(s) Oral once PRN  dextrose 50% Injectable 12.5 Gram(s) IV Push once  dextrose 50% Injectable 25 Gram(s) IV Push once  dextrose 50% Injectable 25 Gram(s) IV Push once  glucagon  Injectable 1 milliGRAM(s) IntraMuscular once PRN  insulin glargine Injectable (LANTUS) 15 Unit(s) SubCutaneous at bedtime  insulin glargine Injectable (LANTUS) 10 Unit(s) SubCutaneous once  insulin Infusion 2 Unit(s)/Hr IV Continuous <Continuous>    dextrose 5% + sodium chloride 0.45% with potassium chloride 10 mEq/L 1000 milliLiter(s) IV Continuous <Continuous>  dextrose 5% + sodium chloride 0.45%. 1000 milliLiter(s) IV Continuous <Continuous>  dextrose 5%. 1000 milliLiter(s) IV Continuous <Continuous>  potassium chloride    Tablet ER 20 milliEquivalent(s) Oral every 2 hours                ICU Vital Signs Last 24 Hrs  T(C): 36.9 (19 May 2018 08:00), Max: 37.4 (19 May 2018 00:00)  T(F): 98.4 (19 May 2018 08:00), Max: 99.4 (19 May 2018 00:00)  HR: 93 (19 May 2018 07:00) (93 - 111)  BP: 113/72 (19 May 2018 07:00) (93/57 - 128/79)  BP(mean): 86 (19 May 2018 07:00) (68 - 99)  ABP: --  ABP(mean): --  RR: 16 (19 May 2018 07:00) (13 - 22)  SpO2: 100% (19 May 2018 07:00) (100% - 100%)      ABG - ( 17 May 2018 21:52 )  pH, Arterial: 6.84  pH, Blood: x     /  pCO2: 9     /  pO2: 152   / HCO3: 4.4   / Base Excess: -30.8 /  SaO2: 97                  I&O's Detail    18 May 2018 07:01  -  19 May 2018 07:00  --------------------------------------------------------  IN:    dextrose 5% + sodium chloride 0.45% with potassium chloride 10 mEq/L: 750 mL    dextrose 5% + sodium chloride 0.45%.: 5258 mL    insulin Infusion: 41 mL    Oral Fluid: 250 mL    Solution: 300 mL    Solution: 50 mL    Solution: 666.5 mL  Total IN: 7315.5 mL    OUT:    Voided: 2800 mL  Total OUT: 2800 mL    Total NET: 4515.5 mL      19 May 2018 07:01  -  19 May 2018 09:26  --------------------------------------------------------  IN:    dextrose 5% + sodium chloride 0.45% with potassium chloride 10 mEq/L: 250 mL    insulin Infusion: 2 mL  Total IN: 252 mL    OUT:  Total OUT: 0 mL    Total NET: 252 mL            LABS:                        9.6    6.0   )-----------( 444      ( 19 May 2018 04:17 )             30.1     -19    137  |  105  |  4.0<L>  ----------------------------<  204<H>  3.4<L>   |  15.0<L>  |  0.62    Ca    8.4<L>      19 May 2018 04:17  Phos  2.8       Mg     2.1                 CAPILLARY BLOOD GLUCOSE      POCT Blood Glucose.: 109 mg/dL (19 May 2018 07:47)    PT/INR - ( 17 May 2018 21:45 )   PT: 11.5 sec;   INR: 1.04 ratio         PTT - ( 17 May 2018 21:45 )  PTT:28.4 sec  Urinalysis Basic - ( 17 May 2018 22:06 )    Color: Yellow / Appearance: Clear / S.015 / pH: x  Gluc: x / Ketone: Large  / Bili: Negative / Urobili: Negative mg/dL   Blood: x / Protein: 30 mg/dL / Nitrite: Negative   Leuk Esterase: Negative / RBC: 0-2 /HPF / WBC 0-2   Sq Epi: x / Non Sq Epi: Occasional / Bacteria: Occasional      CULTURES:  Culture Results:   <10,000 CFU/ml Gram Positive Cocci in Clusters (18 @ 22:06)      Physical Examination:    General: No acute distress.  Alert, oriented, interactive, nonfocal    HEENT: Pupils equal, reactive to light.  Symmetric.    PULM: Clear to auscultation bilaterally, no significant sputum production    CVS: Regular rate and rhythm, no murmurs, rubs, or gallops    ABD: Soft, nondistended, nontender, normoactive bowel sounds, no masses    EXT: No edema, nontender    SKIN: Warm and well perfused, no rashes noted.    RADIOLOGY: ***    CRITICAL CARE TIME SPENT: *** Patient is a 17y old  Female who presents with a chief complaint of DKA (17 May 2018 22:50)      BRIEF HOSPITAL COURSE: DKA, hypokalemia, dehydration from non-compliance    Events last 24 hours: Pt doing better, will transition off insulin gtt onto long acting insulin.  Pt has a pediatric endocrinologist that she follows with outpt as our endocrinologist will not see pt until 19 years old.  Sole/DM education aware of pt.    PAST MEDICAL & SURGICAL HISTORY:  Diabetes: type I  Asthma   delivery delivered      Review of Systems:  CONSTITUTIONAL: No fever, chills, or fatigue  EYES: No eye pain, visual disturbances, or discharge  ENMT:  No difficulty hearing, tinnitus, vertigo; No sinus or throat pain  NECK: No pain or stiffness  RESPIRATORY: No cough, wheezing, chills or hemoptysis; No shortness of breath  CARDIOVASCULAR: No chest pain, palpitations, dizziness, or leg swelling  GASTROINTESTINAL: No abdominal or epigastric pain. No nausea, vomiting, or hematemesis; No diarrhea or constipation. No melena or hematochezia.  GENITOURINARY: No dysuria, frequency, hematuria, or incontinence  NEUROLOGICAL: No headaches, memory loss, loss of strength, numbness, or tremors  SKIN: No itching, burning, rashes, or lesions   MUSCULOSKELETAL: No joint pain or swelling; No muscle, back, or extremity pain  PSYCHIATRIC: No depression, anxiety, mood swings, or difficulty sleeping      Medications:            enoxaparin Injectable 40 milliGRAM(s) SubCutaneous daily    pantoprazole  Injectable 40 milliGRAM(s) IV Push daily      dextrose 40% Gel 15 Gram(s) Oral once PRN  dextrose 50% Injectable 12.5 Gram(s) IV Push once  dextrose 50% Injectable 25 Gram(s) IV Push once  dextrose 50% Injectable 25 Gram(s) IV Push once  glucagon  Injectable 1 milliGRAM(s) IntraMuscular once PRN  insulin glargine Injectable (LANTUS) 15 Unit(s) SubCutaneous at bedtime  insulin glargine Injectable (LANTUS) 10 Unit(s) SubCutaneous once  insulin Infusion 2 Unit(s)/Hr IV Continuous <Continuous>    dextrose 5% + sodium chloride 0.45% with potassium chloride 10 mEq/L 1000 milliLiter(s) IV Continuous <Continuous>  dextrose 5% + sodium chloride 0.45%. 1000 milliLiter(s) IV Continuous <Continuous>  dextrose 5%. 1000 milliLiter(s) IV Continuous <Continuous>  potassium chloride    Tablet ER 20 milliEquivalent(s) Oral every 2 hours                ICU Vital Signs Last 24 Hrs  T(C): 36.9 (19 May 2018 08:00), Max: 37.4 (19 May 2018 00:00)  T(F): 98.4 (19 May 2018 08:00), Max: 99.4 (19 May 2018 00:00)  HR: 93 (19 May 2018 07:00) (93 - 111)  BP: 113/72 (19 May 2018 07:00) (93/57 - 128/79)  BP(mean): 86 (19 May 2018 07:00) (68 - 99)  ABP: --  ABP(mean): --  RR: 16 (19 May 2018 07:00) (13 - 22)  SpO2: 100% (19 May 2018 07:00) (100% - 100%)      ABG - ( 17 May 2018 21:52 )  pH, Arterial: 6.84  pH, Blood: x     /  pCO2: 9     /  pO2: 152   / HCO3: 4.4   / Base Excess: -30.8 /  SaO2: 97                  I&O's Detail    18 May 2018 07:01  -  19 May 2018 07:00  --------------------------------------------------------  IN:    dextrose 5% + sodium chloride 0.45% with potassium chloride 10 mEq/L: 750 mL    dextrose 5% + sodium chloride 0.45%.: 5258 mL    insulin Infusion: 41 mL    Oral Fluid: 250 mL    Solution: 300 mL    Solution: 50 mL    Solution: 666.5 mL  Total IN: 7315.5 mL    OUT:    Voided: 2800 mL  Total OUT: 2800 mL    Total NET: 4515.5 mL      19 May 2018 07:01  -  19 May 2018 09:26  --------------------------------------------------------  IN:    dextrose 5% + sodium chloride 0.45% with potassium chloride 10 mEq/L: 250 mL    insulin Infusion: 2 mL  Total IN: 252 mL    OUT:  Total OUT: 0 mL    Total NET: 252 mL            LABS:                        9.6    6.0   )-----------( 444      ( 19 May 2018 04:17 )             30.1     -19    137  |  105  |  4.0<L>  ----------------------------<  204<H>  3.4<L>   |  15.0<L>  |  0.62    Ca    8.4<L>      19 May 2018 04:17  Phos  2.8       Mg     2.1                 CAPILLARY BLOOD GLUCOSE      POCT Blood Glucose.: 109 mg/dL (19 May 2018 07:47)    PT/INR - ( 17 May 2018 21:45 )   PT: 11.5 sec;   INR: 1.04 ratio         PTT - ( 17 May 2018 21:45 )  PTT:28.4 sec  Urinalysis Basic - ( 17 May 2018 22:06 )    Color: Yellow / Appearance: Clear / S.015 / pH: x  Gluc: x / Ketone: Large  / Bili: Negative / Urobili: Negative mg/dL   Blood: x / Protein: 30 mg/dL / Nitrite: Negative   Leuk Esterase: Negative / RBC: 0-2 /HPF / WBC 0-2   Sq Epi: x / Non Sq Epi: Occasional / Bacteria: Occasional      CULTURES:  Culture Results:   <10,000 CFU/ml Gram Positive Cocci in Clusters (18 @ 22:06)      Physical Examination:    General: No acute distress.  Alert, oriented, interactive, nonfocal    HEENT: Pupils equal, reactive to light.  Symmetric.    PULM: Clear to auscultation bilaterally, no significant sputum production    CVS: Regular rate and rhythm, no murmurs, rubs, or gallops    ABD: Soft, nondistended, nontender, normoactive bowel sounds, no masses    EXT: No edema, nontender    SKIN: Warm and well perfused, no rashes noted.    RADIOLOGY: images reviewed    CRITICAL CARE TIME SPENT: ***

## 2018-05-20 ENCOUNTER — TRANSCRIPTION ENCOUNTER (OUTPATIENT)
Age: 18
End: 2018-05-20

## 2018-05-20 VITALS
TEMPERATURE: 98 F | HEART RATE: 108 BPM | DIASTOLIC BLOOD PRESSURE: 84 MMHG | OXYGEN SATURATION: 98 % | RESPIRATION RATE: 18 BRPM | SYSTOLIC BLOOD PRESSURE: 121 MMHG

## 2018-05-20 LAB
ANION GAP SERPL CALC-SCNC: 14 MMOL/L — SIGNIFICANT CHANGE UP (ref 5–17)
BUN SERPL-MCNC: 4 MG/DL — LOW (ref 8–20)
CALCIUM SERPL-MCNC: 9 MG/DL — SIGNIFICANT CHANGE UP (ref 8.6–10.2)
CHLORIDE SERPL-SCNC: 107 MMOL/L — SIGNIFICANT CHANGE UP (ref 98–107)
CO2 SERPL-SCNC: 20 MMOL/L — LOW (ref 22–29)
CREAT SERPL-MCNC: 0.57 MG/DL — SIGNIFICANT CHANGE UP (ref 0.5–1.3)
GLUCOSE BLDC GLUCOMTR-MCNC: 116 MG/DL — HIGH (ref 70–99)
GLUCOSE BLDC GLUCOMTR-MCNC: 124 MG/DL — HIGH (ref 70–99)
GLUCOSE BLDC GLUCOMTR-MCNC: 133 MG/DL — HIGH (ref 70–99)
GLUCOSE BLDC GLUCOMTR-MCNC: 44 MG/DL — CRITICAL LOW (ref 70–99)
GLUCOSE SERPL-MCNC: 159 MG/DL — HIGH (ref 70–115)
HCT VFR BLD CALC: 29.8 % — LOW (ref 37–47)
HGB BLD-MCNC: 9.5 G/DL — LOW (ref 12–16)
MAGNESIUM SERPL-MCNC: 1.7 MG/DL — LOW (ref 1.8–2.6)
MCHC RBC-ENTMCNC: 26.6 PG — LOW (ref 27–31)
MCHC RBC-ENTMCNC: 31.9 G/DL — LOW (ref 32–36)
MCV RBC AUTO: 83.5 FL — SIGNIFICANT CHANGE UP (ref 81–99)
PHOSPHATE SERPL-MCNC: 3.1 MG/DL — SIGNIFICANT CHANGE UP (ref 2.4–4.7)
PLATELET # BLD AUTO: 416 K/UL — HIGH (ref 150–400)
POTASSIUM SERPL-MCNC: 3.8 MMOL/L — SIGNIFICANT CHANGE UP (ref 3.5–5.3)
POTASSIUM SERPL-SCNC: 3.8 MMOL/L — SIGNIFICANT CHANGE UP (ref 3.5–5.3)
RBC # BLD: 3.57 M/UL — LOW (ref 4.4–5.2)
RBC # FLD: 17.8 % — HIGH (ref 11–15.6)
SODIUM SERPL-SCNC: 141 MMOL/L — SIGNIFICANT CHANGE UP (ref 135–145)
WBC # BLD: 4.6 K/UL — LOW (ref 4.8–10.8)
WBC # FLD AUTO: 4.6 K/UL — LOW (ref 4.8–10.8)

## 2018-05-20 PROCEDURE — 99239 HOSP IP/OBS DSCHRG MGMT >30: CPT

## 2018-05-20 PROCEDURE — 82009 KETONE BODYS QUAL: CPT

## 2018-05-20 PROCEDURE — 85014 HEMATOCRIT: CPT

## 2018-05-20 PROCEDURE — 99285 EMERGENCY DEPT VISIT HI MDM: CPT | Mod: 25

## 2018-05-20 PROCEDURE — 83690 ASSAY OF LIPASE: CPT

## 2018-05-20 PROCEDURE — 87086 URINE CULTURE/COLONY COUNT: CPT

## 2018-05-20 PROCEDURE — 85610 PROTHROMBIN TIME: CPT

## 2018-05-20 PROCEDURE — 84295 ASSAY OF SERUM SODIUM: CPT

## 2018-05-20 PROCEDURE — 82962 GLUCOSE BLOOD TEST: CPT

## 2018-05-20 PROCEDURE — 83735 ASSAY OF MAGNESIUM: CPT

## 2018-05-20 PROCEDURE — 83605 ASSAY OF LACTIC ACID: CPT

## 2018-05-20 PROCEDURE — 36415 COLL VENOUS BLD VENIPUNCTURE: CPT

## 2018-05-20 PROCEDURE — 82435 ASSAY OF BLOOD CHLORIDE: CPT

## 2018-05-20 PROCEDURE — 83036 HEMOGLOBIN GLYCOSYLATED A1C: CPT

## 2018-05-20 PROCEDURE — 82947 ASSAY GLUCOSE BLOOD QUANT: CPT

## 2018-05-20 PROCEDURE — 82330 ASSAY OF CALCIUM: CPT

## 2018-05-20 PROCEDURE — 96375 TX/PRO/DX INJ NEW DRUG ADDON: CPT

## 2018-05-20 PROCEDURE — 83880 ASSAY OF NATRIURETIC PEPTIDE: CPT

## 2018-05-20 PROCEDURE — 84100 ASSAY OF PHOSPHORUS: CPT

## 2018-05-20 PROCEDURE — 81001 URINALYSIS AUTO W/SCOPE: CPT

## 2018-05-20 PROCEDURE — 87040 BLOOD CULTURE FOR BACTERIA: CPT

## 2018-05-20 PROCEDURE — 82803 BLOOD GASES ANY COMBINATION: CPT

## 2018-05-20 PROCEDURE — 80048 BASIC METABOLIC PNL TOTAL CA: CPT

## 2018-05-20 PROCEDURE — 71045 X-RAY EXAM CHEST 1 VIEW: CPT

## 2018-05-20 PROCEDURE — 84132 ASSAY OF SERUM POTASSIUM: CPT

## 2018-05-20 PROCEDURE — 84702 CHORIONIC GONADOTROPIN TEST: CPT

## 2018-05-20 PROCEDURE — 96374 THER/PROPH/DIAG INJ IV PUSH: CPT

## 2018-05-20 PROCEDURE — 36600 WITHDRAWAL OF ARTERIAL BLOOD: CPT

## 2018-05-20 PROCEDURE — 85730 THROMBOPLASTIN TIME PARTIAL: CPT

## 2018-05-20 PROCEDURE — 85027 COMPLETE CBC AUTOMATED: CPT

## 2018-05-20 RX ORDER — MAGNESIUM SULFATE 500 MG/ML
1 VIAL (ML) INJECTION ONCE
Qty: 0 | Refills: 0 | Status: COMPLETED | OUTPATIENT
Start: 2018-05-20 | End: 2018-05-20

## 2018-05-20 RX ORDER — PANTOPRAZOLE SODIUM 20 MG/1
1 TABLET, DELAYED RELEASE ORAL
Qty: 30 | Refills: 0 | OUTPATIENT
Start: 2018-05-20 | End: 2018-06-18

## 2018-05-20 RX ADMIN — Medication 5 UNIT(S): at 09:54

## 2018-05-20 RX ADMIN — Medication 100 GRAM(S): at 09:54

## 2018-05-20 NOTE — DISCHARGE NOTE ADULT - PATIENT PORTAL LINK FT
You can access the [facility], offered by [facility], by registering with the following website: [email or web address]

## 2018-05-20 NOTE — DISCHARGE NOTE ADULT - MEDICATION SUMMARY - MEDICATIONS TO CHANGE
I will SWITCH the dose or number of times a day I take the medications listed below when I get home from the hospital:    Lantus Solostar Pen 100 units/mL subcutaneous solution  -- 23 unit(s) subcutaneous once a day (at bedtime)

## 2018-05-20 NOTE — DISCHARGE NOTE ADULT - PLAN OF CARE
resolved follow up with [facility] home lantus and low carb diet plan as per #1 home inhalers secondary to dka resovled

## 2018-05-20 NOTE — DISCHARGE NOTE ADULT - CARE PROVIDER_API CALL
Taiwo Bennett), Internal Medicine  [address] [place] [address]  Phone: [phone]  Fax: [phone]    candice lindsey  Phone: (   )    -  Fax: (   )    -

## 2018-05-20 NOTE — DISCHARGE NOTE ADULT - MEDICATION SUMMARY - MEDICATIONS TO TAKE
I will START or STAY ON the medications listed below when I get home from the hospital:    NovoLOG FlexPen 100 units/mL injectable solution  -- 5 unit(s) injectable 3 times a day (with meals)   -- Check with your doctor before becoming pregnant.  Do not drink alcoholic beverages when taking this medication.  Keep in refrigerator.  Do not freeze.  Obtain medical advice before taking any non-prescription drugs as some may affect the action of this medication.    -- Indication: For Diabetic ketoacidosis without coma associated with type 1 diabetes mellitus    Lantus Solostar Pen 100 units/mL subcutaneous solution  -- 25 unit(s) subcutaneous once a day (at bedtime)  -- Indication: For Diabetic ketoacidosis without coma associated with type 1 diabetes mellitus    ProAir HFA 90 mcg/inh inhalation aerosol  -- 2 puff(s) inhaled 4 times a day   -- For inhalation only.  It is very important that you take or use this exactly as directed.  Do not skip doses or discontinue unless directed by your doctor.  Obtain medical advice before taking any non-prescription drugs as some may affect the action of this medication.  Shake well before use.    -- Indication: For Asthma    Protonix 40 mg oral delayed release tablet  -- 1 tab(s) by mouth once a day   -- It is very important that you take or use this exactly as directed.  Do not skip doses or discontinue unless directed by your doctor.  Obtain medical advice before taking any non-prescription drugs as some may affect the action of this medication.  Swallow whole.  Do not crush.    -- Indication: For gerd    HYDROcodone-homatropine 5 mg-1.5 mg/5 mL oral syrup  -- 5 milliliter(s) by mouth every 8 hours, As Needed -Cough MDD:3  -- Indication: For cough I will START or STAY ON the medications listed below when I get home from the hospital:    glucometer  -- 1   -- Indication: For Diabetes    NovoLOG FlexPen 100 units/mL injectable solution  -- 5 unit(s) injectable 3 times a day (with meals)   -- Check with your doctor before becoming pregnant.  Do not drink alcoholic beverages when taking this medication.  Keep in refrigerator.  Do not freeze.  Obtain medical advice before taking any non-prescription drugs as some may affect the action of this medication.    -- Indication: For Diabetic ketoacidosis without coma associated with type 1 diabetes mellitus    Lantus Solostar Pen 100 units/mL subcutaneous solution  -- 25 unit(s) subcutaneous once a day (at bedtime)  -- Indication: For Diabetic ketoacidosis without coma associated with type 1 diabetes mellitus    ProAir HFA 90 mcg/inh inhalation aerosol  -- 2 puff(s) inhaled 4 times a day   -- For inhalation only.  It is very important that you take or use this exactly as directed.  Do not skip doses or discontinue unless directed by your doctor.  Obtain medical advice before taking any non-prescription drugs as some may affect the action of this medication.  Shake well before use.    -- Indication: For Asthma    Protonix 40 mg oral delayed release tablet  -- 1 tab(s) by mouth once a day   -- It is very important that you take or use this exactly as directed.  Do not skip doses or discontinue unless directed by your doctor.  Obtain medical advice before taking any non-prescription drugs as some may affect the action of this medication.  Swallow whole.  Do not crush.    -- Indication: For gerd    HYDROcodone-homatropine 5 mg-1.5 mg/5 mL oral syrup  -- 5 milliliter(s) by mouth every 8 hours, As Needed -Cough MDD:3  -- Indication: For cough

## 2018-05-20 NOTE — DISCHARGE NOTE ADULT - HOSPITAL COURSE
17F w/ PMH of type-1 DM, asthma (proventil only) and 1 , presents with several day Hx of "not feeling well," and nausea/vomitting.  Patient's ABG revealed a pH of 6.8, and HCO3 of 9. LArge ketones noted in UA. BMP currently pending  -sugar undetecatable on fingers tick, showed 730 on ABG  -also has recieved 3 liters of fluid  -1 amp of bicarb pushed by ER as pH <7.  -On arrival to ER patient is sitting up in bed, tachypneic, kussmaul type breathing.    Patient admitted to icu for dka, gap metabolic acidosis and hyperkalemia. Patient started on dka protocol and observed for 2 days and downgraded to medicine on . Patient is now stable for dc home    time spent on dc 32 minutes  -

## 2018-05-20 NOTE — DISCHARGE NOTE ADULT - CARE PLAN
Principal Discharge DX:	Diabetic ketoacidosis without coma associated with type 1 diabetes mellitus  Goal:	resolved  Assessment and plan of treatment:	follow up with [facility] home lantus and low carb diet  Secondary Diagnosis:	Diabetes  Goal:	plan as per #1  Secondary Diagnosis:	Asthma  Goal:	home inhalers  Secondary Diagnosis:	Metabolic acidosis  Goal:	resolved  Assessment and plan of treatment:	secondary to dka  Secondary Diagnosis:	Hyperkalemia  Goal:	resovled

## 2018-05-23 LAB
CULTURE RESULTS: SIGNIFICANT CHANGE UP
SPECIMEN SOURCE: SIGNIFICANT CHANGE UP

## 2018-06-17 ENCOUNTER — EMERGENCY (EMERGENCY)
Facility: HOSPITAL | Age: 18
LOS: 1 days | Discharge: HOME | End: 2018-06-17
Attending: EMERGENCY MEDICINE
Payer: MEDICAID

## 2018-06-17 VITALS — WEIGHT: 119.93 LBS | HEIGHT: 63 IN

## 2018-06-17 PROCEDURE — 99283 EMERGENCY DEPT VISIT LOW MDM: CPT | Mod: 25

## 2018-06-18 VITALS
SYSTOLIC BLOOD PRESSURE: 126 MMHG | TEMPERATURE: 98 F | RESPIRATION RATE: 18 BRPM | DIASTOLIC BLOOD PRESSURE: 86 MMHG | HEART RATE: 120 BPM | OXYGEN SATURATION: 99 %

## 2018-06-18 PROCEDURE — 73610 X-RAY EXAM OF ANKLE: CPT

## 2018-06-18 PROCEDURE — 99283 EMERGENCY DEPT VISIT LOW MDM: CPT

## 2018-06-18 PROCEDURE — 73610 X-RAY EXAM OF ANKLE: CPT | Mod: 26,LT

## 2018-06-18 RX ORDER — IBUPROFEN 200 MG
600 TABLET ORAL ONCE
Qty: 0 | Refills: 0 | Status: COMPLETED | OUTPATIENT
Start: 2018-06-18 | End: 2018-06-18

## 2018-06-18 RX ADMIN — Medication 600 MILLIGRAM(S): at 00:58

## 2018-06-18 NOTE — ED PROVIDER NOTE - ATTENDING CONTRIBUTION TO CARE
I personally saw the patient with the PA, and completed the key components of the history and physical exam. I then discussed the management plan with the PA.   gen in nad resp clear cardiac no murmur abd soft neuro no deficits msk + ttp left lat mall achilles intact nml pulses nrom lle all digits agree with pa plan of care

## 2018-06-18 NOTE — ED ADULT NURSE NOTE - OBJECTIVE STATEMENT
Pt a&ox3 c/o left ankle pain s/p twisting ankle last night, swelling noted to left ankle no obvious defomity noted skin tinact, +pms. Ice applied and extremity elevated for comfort, medicated per PA orders. Pt in no apparent distress @this time, family @ bedside, RR even and unlabored, safety maintained

## 2018-06-18 NOTE — ED PROVIDER NOTE - OBJECTIVE STATEMENT
Patient is a 18 y/o female c/o of left ankle pain s/p injury. Patient [place] a house, when she stepped on one of the railing and twisted her ankle. Patient admits to difficulty ambulating on left foot. Patient denies any other complaints. Patient denies numbness or loss of sensation, abrasions or lacerations.

## 2018-06-18 NOTE — ED PROVIDER NOTE - PHYSICAL EXAMINATION
Const: Awake, alert and oriented. In no acute distress. Well appearing.  HEENT: NC/AT. Moist mucous membranes.  Eyes: No scleral icterus. EOMI.  Neck:. Soft and supple. Full ROM without pain.  Cardiac: Regular rate and regular rhythm. +S1/S2. No murmurs. Peripheral pulses 2+ and symmetric. No LE edema.  Resp: Speaking in full sentences. No evidence of respiratory distress. No wheezes, rales or rhonchi.  Abd: Soft, non-tender, non-distended. Normal bowel sounds in all 4 quadrants. No guarding or rebound.  Back: Spine midline and non-tender. No CVAT.  MSK: Tenderness over left ankle, decreased ROM secondary to pain, neurovasculary intact, DP palpable   Skin: No rashes, abrasions or lacerations.  Lymph: No cervical lymphadenopathy.  Neuro: Awake, alert & oriented x 3. Moves all extremities symmetrically.

## 2018-07-13 ENCOUNTER — APPOINTMENT (OUTPATIENT)
Dept: CHRONIC DISEASE MANAGEMENT | Facility: CLINIC | Age: 18
End: 2018-07-13

## 2018-08-09 ENCOUNTER — EMERGENCY (EMERGENCY)
Facility: HOSPITAL | Age: 18
LOS: 1 days | Discharge: HOME | End: 2018-08-09
Attending: EMERGENCY MEDICINE
Payer: MEDICAID

## 2018-08-09 VITALS — HEIGHT: 62 IN | WEIGHT: 119.93 LBS

## 2018-08-09 PROCEDURE — 99284 EMERGENCY DEPT VISIT MOD MDM: CPT | Mod: 25

## 2018-08-09 RX ORDER — KETOROLAC TROMETHAMINE 30 MG/ML
30 SYRINGE (ML) INJECTION ONCE
Qty: 0 | Refills: 0 | Status: DISCONTINUED | OUTPATIENT
Start: 2018-08-09 | End: 2018-08-09

## 2018-08-09 RX ORDER — ONDANSETRON 8 MG/1
8 TABLET, FILM COATED ORAL ONCE
Qty: 0 | Refills: 0 | Status: COMPLETED | OUTPATIENT
Start: 2018-08-09 | End: 2018-08-09

## 2018-08-09 NOTE — ED ADULT NURSE NOTE - NSIMPLEMENTINTERV_GEN_ALL_ED
Implemented All Universal Safety Interventions:  [place] to call system. Call bell, personal items and telephone within reach. Instruct patient to call for assistance. Room bathroom lighting operational. Non-slip footwear when patient is off stretcher. Physically safe environment: no spills, clutter or unnecessary equipment. Stretcher in lowest position, wheels locked, appropriate side rails in place.

## 2018-08-09 NOTE — ED PROVIDER NOTE - OBJECTIVE STATEMENT
18 year old female presenting with x 2 weeks of 18 year old female presenting with x 2 weeks of RUQ/ epigastric pain described as sharp intermittent non radiating, worsened today. states that today she also had a temperature of 102. took tylenol for fever. denies sick contacts or recent travel. hx of c section, passing gas and normal bowel movements. decreased appetite, nausea but NO VOMITING. pt DMI on insulin sugars running about 120-180 which is lower than her normal. denies cp.sob. dysuria hematuria, vaginal bleeding or discharge.

## 2018-08-09 NOTE — ED ADULT NURSE NOTE - OBJECTIVE STATEMENT
Pt A&Ox4 c/o epi gastric pain for 2 week, worse and had fever at this time. Pt resting comfortably, VSS, no signs of distress at this time, safety maintained, call bell in reach.

## 2018-08-09 NOTE — ED PROVIDER NOTE - ATTENDING CONTRIBUTION TO CARE
18y old with complaints of right sided pain, with fever, plan ct abd and pelvis, plan fluids, sirs, labs reviewed, and close

## 2018-08-10 VITALS
OXYGEN SATURATION: 98 % | TEMPERATURE: 98 F | SYSTOLIC BLOOD PRESSURE: 104 MMHG | DIASTOLIC BLOOD PRESSURE: 65 MMHG | HEART RATE: 99 BPM | RESPIRATION RATE: 16 BRPM

## 2018-08-10 LAB
ACETONE SERPL-MCNC: NEGATIVE — SIGNIFICANT CHANGE UP
ALBUMIN SERPL ELPH-MCNC: 4.4 G/DL — SIGNIFICANT CHANGE UP (ref 3.3–5.2)
ALP SERPL-CCNC: 79 U/L — SIGNIFICANT CHANGE UP (ref 40–120)
ALT FLD-CCNC: 36 U/L — HIGH
ANION GAP SERPL CALC-SCNC: 18 MMOL/L — HIGH (ref 5–17)
ANISOCYTOSIS BLD QL: SLIGHT — SIGNIFICANT CHANGE UP
APPEARANCE UR: CLEAR — SIGNIFICANT CHANGE UP
APTT BLD: 27.9 SEC — SIGNIFICANT CHANGE UP (ref 27.5–37.4)
AST SERPL-CCNC: 77 U/L — HIGH
BACTERIA # UR AUTO: ABNORMAL
BASE EXCESS BLDV CALC-SCNC: -1 MMOL/L — SIGNIFICANT CHANGE UP (ref -2–2)
BILIRUB SERPL-MCNC: 0.3 MG/DL — LOW (ref 0.4–2)
BILIRUB UR-MCNC: NEGATIVE — SIGNIFICANT CHANGE UP
BUN SERPL-MCNC: 17 MG/DL — SIGNIFICANT CHANGE UP (ref 8–20)
CA-I SERPL-SCNC: 1.29 MMOL/L — SIGNIFICANT CHANGE UP (ref 1.15–1.33)
CALCIUM SERPL-MCNC: 10.5 MG/DL — HIGH (ref 8.6–10.2)
CHLORIDE BLDV-SCNC: 105 MMOL/L — SIGNIFICANT CHANGE UP (ref 98–107)
CHLORIDE SERPL-SCNC: 99 MMOL/L — SIGNIFICANT CHANGE UP (ref 98–107)
CO2 SERPL-SCNC: 22 MMOL/L — SIGNIFICANT CHANGE UP (ref 22–29)
COLOR SPEC: YELLOW — SIGNIFICANT CHANGE UP
CREAT SERPL-MCNC: 0.79 MG/DL — SIGNIFICANT CHANGE UP (ref 0.5–1.3)
DACRYOCYTES BLD QL SMEAR: SLIGHT — SIGNIFICANT CHANGE UP
DIFF PNL FLD: ABNORMAL
EOSINOPHIL NFR BLD AUTO: 1 % — SIGNIFICANT CHANGE UP (ref 0–5)
EPI CELLS # UR: ABNORMAL
GAS PNL BLDV: 143 MMOL/L — SIGNIFICANT CHANGE UP (ref 135–145)
GAS PNL BLDV: SIGNIFICANT CHANGE UP
GAS PNL BLDV: SIGNIFICANT CHANGE UP
GLUCOSE BLDV-MCNC: 80 MG/DL — SIGNIFICANT CHANGE UP (ref 70–99)
GLUCOSE SERPL-MCNC: 82 MG/DL — SIGNIFICANT CHANGE UP (ref 70–115)
GLUCOSE UR QL: 1000 MG/DL
HCG UR QL: NEGATIVE — SIGNIFICANT CHANGE UP
HCO3 BLDV-SCNC: 24 MMOL/L — SIGNIFICANT CHANGE UP (ref 21–29)
HCT VFR BLD CALC: 32.1 % — LOW (ref 37–47)
HCT VFR BLDA CALC: 32 — LOW (ref 39–50)
HGB BLD CALC-MCNC: 10.5 G/DL — LOW (ref 11.5–15.5)
HGB BLD-MCNC: 10.4 G/DL — LOW (ref 12–16)
HYPOCHROMIA BLD QL: SLIGHT — SIGNIFICANT CHANGE UP
INR BLD: 0.88 RATIO — SIGNIFICANT CHANGE UP (ref 0.88–1.16)
KETONES UR-MCNC: ABNORMAL
LACTATE BLDV-MCNC: 1.5 MMOL/L — SIGNIFICANT CHANGE UP (ref 0.5–2)
LACTATE BLDV-MCNC: 3.2 MMOL/L — HIGH (ref 0.5–2)
LEUKOCYTE ESTERASE UR-ACNC: ABNORMAL
LIDOCAIN IGE QN: 12 U/L — LOW (ref 22–51)
LYMPHOCYTES # BLD AUTO: 65 % — HIGH (ref 20–55)
MAGNESIUM SERPL-MCNC: 2 MG/DL — SIGNIFICANT CHANGE UP (ref 1.6–2.6)
MCHC RBC-ENTMCNC: 27.5 PG — SIGNIFICANT CHANGE UP (ref 27–31)
MCHC RBC-ENTMCNC: 32.4 G/DL — SIGNIFICANT CHANGE UP (ref 32–36)
MCV RBC AUTO: 84.9 FL — SIGNIFICANT CHANGE UP (ref 81–99)
MONOCYTES NFR BLD AUTO: 7 % — SIGNIFICANT CHANGE UP (ref 3–10)
NEUTROPHILS NFR BLD AUTO: 27 % — LOW (ref 37–73)
NITRITE UR-MCNC: NEGATIVE — SIGNIFICANT CHANGE UP
OTHER CELLS CSF MANUAL: 15 ML/DL — LOW (ref 18–22)
OVALOCYTES BLD QL SMEAR: SLIGHT — SIGNIFICANT CHANGE UP
PCO2 BLDV: 39 MMHG — SIGNIFICANT CHANGE UP (ref 35–50)
PH BLDV: 7.39 — SIGNIFICANT CHANGE UP (ref 7.32–7.43)
PH UR: 6 — SIGNIFICANT CHANGE UP (ref 5–8)
PLAT MORPH BLD: NORMAL — SIGNIFICANT CHANGE UP
PLATELET # BLD AUTO: 571 K/UL — HIGH (ref 150–400)
PO2 BLDV: 164 MMHG — HIGH (ref 25–45)
POIKILOCYTOSIS BLD QL AUTO: SLIGHT — SIGNIFICANT CHANGE UP
POTASSIUM BLDV-SCNC: 3.5 MMOL/L — SIGNIFICANT CHANGE UP (ref 3.4–4.5)
POTASSIUM SERPL-MCNC: 3.6 MMOL/L — SIGNIFICANT CHANGE UP (ref 3.5–5.3)
POTASSIUM SERPL-SCNC: 3.6 MMOL/L — SIGNIFICANT CHANGE UP (ref 3.5–5.3)
PROT SERPL-MCNC: 8 G/DL — SIGNIFICANT CHANGE UP (ref 6.6–8.7)
PROT UR-MCNC: 100 MG/DL
PROTHROM AB SERPL-ACNC: 9.6 SEC — LOW (ref 9.8–12.7)
RBC # BLD: 3.78 M/UL — LOW (ref 4.4–5.2)
RBC # FLD: 17 % — HIGH (ref 11–15.6)
RBC BLD AUTO: ABNORMAL
RBC CASTS # UR COMP ASSIST: SIGNIFICANT CHANGE UP /HPF (ref 0–4)
SAO2 % BLDV: 100 % — SIGNIFICANT CHANGE UP
SODIUM SERPL-SCNC: 139 MMOL/L — SIGNIFICANT CHANGE UP (ref 135–145)
SP GR SPEC: 1.02 — SIGNIFICANT CHANGE UP (ref 1.01–1.02)
STOMATOCYTES BLD QL SMEAR: SLIGHT — SIGNIFICANT CHANGE UP
UROBILINOGEN FLD QL: 1 MG/DL
WBC # BLD: 5 K/UL — SIGNIFICANT CHANGE UP (ref 4.8–10.8)
WBC # FLD AUTO: 5 K/UL — SIGNIFICANT CHANGE UP (ref 4.8–10.8)
WBC UR QL: ABNORMAL

## 2018-08-10 PROCEDURE — 76705 ECHO EXAM OF ABDOMEN: CPT | Mod: 26

## 2018-08-10 PROCEDURE — 84295 ASSAY OF SERUM SODIUM: CPT

## 2018-08-10 PROCEDURE — 81025 URINE PREGNANCY TEST: CPT

## 2018-08-10 PROCEDURE — 85027 COMPLETE CBC AUTOMATED: CPT

## 2018-08-10 PROCEDURE — 85610 PROTHROMBIN TIME: CPT

## 2018-08-10 PROCEDURE — 96374 THER/PROPH/DIAG INJ IV PUSH: CPT | Mod: XU

## 2018-08-10 PROCEDURE — 83735 ASSAY OF MAGNESIUM: CPT

## 2018-08-10 PROCEDURE — 82330 ASSAY OF CALCIUM: CPT

## 2018-08-10 PROCEDURE — 80053 COMPREHEN METABOLIC PANEL: CPT

## 2018-08-10 PROCEDURE — 83690 ASSAY OF LIPASE: CPT

## 2018-08-10 PROCEDURE — 84132 ASSAY OF SERUM POTASSIUM: CPT

## 2018-08-10 PROCEDURE — 99284 EMERGENCY DEPT VISIT MOD MDM: CPT | Mod: 25

## 2018-08-10 PROCEDURE — 96375 TX/PRO/DX INJ NEW DRUG ADDON: CPT

## 2018-08-10 PROCEDURE — 82009 KETONE BODYS QUAL: CPT

## 2018-08-10 PROCEDURE — 82803 BLOOD GASES ANY COMBINATION: CPT

## 2018-08-10 PROCEDURE — 81001 URINALYSIS AUTO W/SCOPE: CPT

## 2018-08-10 PROCEDURE — 85014 HEMATOCRIT: CPT

## 2018-08-10 PROCEDURE — 83605 ASSAY OF LACTIC ACID: CPT

## 2018-08-10 PROCEDURE — 82947 ASSAY GLUCOSE BLOOD QUANT: CPT

## 2018-08-10 PROCEDURE — 76705 ECHO EXAM OF ABDOMEN: CPT

## 2018-08-10 PROCEDURE — 96361 HYDRATE IV INFUSION ADD-ON: CPT

## 2018-08-10 PROCEDURE — 87086 URINE CULTURE/COLONY COUNT: CPT

## 2018-08-10 PROCEDURE — 82435 ASSAY OF BLOOD CHLORIDE: CPT

## 2018-08-10 PROCEDURE — 36415 COLL VENOUS BLD VENIPUNCTURE: CPT

## 2018-08-10 PROCEDURE — 85730 THROMBOPLASTIN TIME PARTIAL: CPT

## 2018-08-10 PROCEDURE — 74177 CT ABD & PELVIS W/CONTRAST: CPT | Mod: 26

## 2018-08-10 PROCEDURE — 74177 CT ABD & PELVIS W/CONTRAST: CPT

## 2018-08-10 RX ORDER — PANTOPRAZOLE SODIUM 20 MG/1
40 TABLET, DELAYED RELEASE ORAL ONCE
Qty: 0 | Refills: 0 | Status: COMPLETED | OUTPATIENT
Start: 2018-08-10 | End: 2018-08-10

## 2018-08-10 RX ORDER — NITROFURANTOIN MACROCRYSTAL 50 MG
1 CAPSULE ORAL
Qty: 10 | Refills: 0 | OUTPATIENT
Start: 2018-08-10 | End: 2018-08-14

## 2018-08-10 RX ORDER — METOCLOPRAMIDE HCL 10 MG
10 TABLET ORAL ONCE
Qty: 0 | Refills: 0 | Status: COMPLETED | OUTPATIENT
Start: 2018-08-10 | End: 2018-08-10

## 2018-08-10 RX ORDER — SODIUM CHLORIDE 9 MG/ML
1000 INJECTION INTRAMUSCULAR; INTRAVENOUS; SUBCUTANEOUS ONCE
Qty: 0 | Refills: 0 | Status: COMPLETED | OUTPATIENT
Start: 2018-08-10 | End: 2018-08-10

## 2018-08-10 RX ORDER — SODIUM CHLORIDE 9 MG/ML
2000 INJECTION INTRAMUSCULAR; INTRAVENOUS; SUBCUTANEOUS ONCE
Qty: 0 | Refills: 0 | Status: COMPLETED | OUTPATIENT
Start: 2018-08-10 | End: 2018-08-10

## 2018-08-10 RX ADMIN — SODIUM CHLORIDE 1000 MILLILITER(S): 9 INJECTION INTRAMUSCULAR; INTRAVENOUS; SUBCUTANEOUS at 01:30

## 2018-08-10 RX ADMIN — SODIUM CHLORIDE 2000 MILLILITER(S): 9 INJECTION INTRAMUSCULAR; INTRAVENOUS; SUBCUTANEOUS at 02:39

## 2018-08-10 RX ADMIN — ONDANSETRON 8 MILLIGRAM(S): 8 TABLET, FILM COATED ORAL at 00:08

## 2018-08-10 RX ADMIN — SODIUM CHLORIDE 1000 MILLILITER(S): 9 INJECTION INTRAMUSCULAR; INTRAVENOUS; SUBCUTANEOUS at 03:09

## 2018-08-10 RX ADMIN — PANTOPRAZOLE SODIUM 40 MILLIGRAM(S): 20 TABLET, DELAYED RELEASE ORAL at 02:39

## 2018-08-10 RX ADMIN — Medication 10 MILLIGRAM(S): at 02:38

## 2018-08-10 RX ADMIN — Medication 30 MILLIGRAM(S): at 00:25

## 2018-08-10 RX ADMIN — Medication 30 MILLIGRAM(S): at 00:08

## 2018-08-10 NOTE — ED ADULT NURSE REASSESSMENT NOTE - NS ED NURSE REASSESS COMMENT FT1
Pt A&Ox4 c/o epigastric pain at this time. Pt resting comfortably, VSS, no signs of distress at this time, awaiting abd CT, safety maintained, call bell in reach.

## 2018-08-10 NOTE — ED ADULT NURSE REASSESSMENT NOTE - COMFORT CARE
wait time explained/plan of care explained/repositioned/warm blanket provided/darkened lights/side rails up

## 2018-08-11 LAB
CULTURE RESULTS: SIGNIFICANT CHANGE UP
SPECIMEN SOURCE: SIGNIFICANT CHANGE UP

## 2018-08-15 LAB
HCT VFR BLDA CALC: 39 — SIGNIFICANT CHANGE UP (ref 39–50)
HGB BLD CALC-MCNC: 12.8 G/DL — SIGNIFICANT CHANGE UP (ref 11.5–15.5)
OTHER CELLS CSF MANUAL: 13 ML/DL — LOW (ref 18–22)

## 2018-09-11 ENCOUNTER — EMERGENCY (EMERGENCY)
Facility: HOSPITAL | Age: 18
LOS: 1 days | Discharge: HOME | End: 2018-09-11
Attending: STUDENT IN AN ORGANIZED HEALTH CARE EDUCATION/TRAINING PROGRAM
Payer: MEDICAID

## 2018-09-11 VITALS — WEIGHT: 110.01 LBS | HEIGHT: 63 IN

## 2018-09-11 LAB
ALBUMIN SERPL ELPH-MCNC: 4.4 G/DL — SIGNIFICANT CHANGE UP (ref 3.3–5.2)
ALP SERPL-CCNC: 101 U/L — SIGNIFICANT CHANGE UP (ref 40–120)
ALT FLD-CCNC: 21 U/L — SIGNIFICANT CHANGE UP
ANION GAP SERPL CALC-SCNC: 16 MMOL/L — SIGNIFICANT CHANGE UP (ref 5–17)
APPEARANCE UR: CLEAR — SIGNIFICANT CHANGE UP
AST SERPL-CCNC: 27 U/L — SIGNIFICANT CHANGE UP
BACTERIA # UR AUTO: ABNORMAL
BASOPHILS # BLD AUTO: 0 K/UL — SIGNIFICANT CHANGE UP (ref 0–0.2)
BASOPHILS NFR BLD AUTO: 0.4 % — SIGNIFICANT CHANGE UP (ref 0–2)
BILIRUB SERPL-MCNC: <0.2 MG/DL — LOW (ref 0.4–2)
BILIRUB UR-MCNC: NEGATIVE — SIGNIFICANT CHANGE UP
BUN SERPL-MCNC: 16 MG/DL — SIGNIFICANT CHANGE UP (ref 8–20)
CALCIUM SERPL-MCNC: 9.8 MG/DL — SIGNIFICANT CHANGE UP (ref 8.6–10.2)
CHLORIDE SERPL-SCNC: 100 MMOL/L — SIGNIFICANT CHANGE UP (ref 98–107)
CO2 SERPL-SCNC: 24 MMOL/L — SIGNIFICANT CHANGE UP (ref 22–29)
COLOR SPEC: YELLOW — SIGNIFICANT CHANGE UP
CREAT SERPL-MCNC: 0.73 MG/DL — SIGNIFICANT CHANGE UP (ref 0.5–1.3)
DIFF PNL FLD: ABNORMAL
EOSINOPHIL # BLD AUTO: 0.1 K/UL — SIGNIFICANT CHANGE UP (ref 0–0.5)
EOSINOPHIL NFR BLD AUTO: 1.2 % — SIGNIFICANT CHANGE UP (ref 0–6)
EPI CELLS # UR: ABNORMAL
GLUCOSE SERPL-MCNC: 213 MG/DL — HIGH (ref 70–115)
GLUCOSE UR QL: 1000 MG/DL
HCG UR QL: NEGATIVE — SIGNIFICANT CHANGE UP
HCT VFR BLD CALC: 32.3 % — LOW (ref 37–47)
HGB BLD-MCNC: 10.1 G/DL — LOW (ref 12–16)
KETONES UR-MCNC: NEGATIVE — SIGNIFICANT CHANGE UP
LEUKOCYTE ESTERASE UR-ACNC: NEGATIVE — SIGNIFICANT CHANGE UP
LYMPHOCYTES # BLD AUTO: 2.1 K/UL — SIGNIFICANT CHANGE UP (ref 1–4.8)
LYMPHOCYTES # BLD AUTO: 41.4 % — SIGNIFICANT CHANGE UP (ref 20–55)
MCHC RBC-ENTMCNC: 27.5 PG — SIGNIFICANT CHANGE UP (ref 27–31)
MCHC RBC-ENTMCNC: 31.3 G/DL — LOW (ref 32–36)
MCV RBC AUTO: 88 FL — SIGNIFICANT CHANGE UP (ref 81–99)
MONOCYTES # BLD AUTO: 0.5 K/UL — SIGNIFICANT CHANGE UP (ref 0–0.8)
MONOCYTES NFR BLD AUTO: 9.5 % — SIGNIFICANT CHANGE UP (ref 3–10)
NEUTROPHILS # BLD AUTO: 2.4 K/UL — SIGNIFICANT CHANGE UP (ref 1.8–8)
NEUTROPHILS NFR BLD AUTO: 47.3 % — SIGNIFICANT CHANGE UP (ref 37–73)
NITRITE UR-MCNC: NEGATIVE — SIGNIFICANT CHANGE UP
PH UR: 6.5 — SIGNIFICANT CHANGE UP (ref 5–8)
PLATELET # BLD AUTO: 505 K/UL — HIGH (ref 150–400)
POTASSIUM SERPL-MCNC: 3.6 MMOL/L — SIGNIFICANT CHANGE UP (ref 3.5–5.3)
POTASSIUM SERPL-SCNC: 3.6 MMOL/L — SIGNIFICANT CHANGE UP (ref 3.5–5.3)
PROT SERPL-MCNC: 8.1 G/DL — SIGNIFICANT CHANGE UP (ref 6.6–8.7)
PROT UR-MCNC: 30 MG/DL
RBC # BLD: 3.67 M/UL — LOW (ref 4.4–5.2)
RBC # FLD: 15.6 % — SIGNIFICANT CHANGE UP (ref 11–15.6)
RBC CASTS # UR COMP ASSIST: SIGNIFICANT CHANGE UP /HPF (ref 0–4)
SODIUM SERPL-SCNC: 140 MMOL/L — SIGNIFICANT CHANGE UP (ref 135–145)
SP GR SPEC: 1.01 — SIGNIFICANT CHANGE UP (ref 1.01–1.02)
UROBILINOGEN FLD QL: NEGATIVE MG/DL — SIGNIFICANT CHANGE UP
WBC # BLD: 5.1 K/UL — SIGNIFICANT CHANGE UP (ref 4.8–10.8)
WBC # FLD AUTO: 5.1 K/UL — SIGNIFICANT CHANGE UP (ref 4.8–10.8)
WBC UR QL: SIGNIFICANT CHANGE UP

## 2018-09-11 PROCEDURE — 36415 COLL VENOUS BLD VENIPUNCTURE: CPT

## 2018-09-11 PROCEDURE — 85027 COMPLETE CBC AUTOMATED: CPT

## 2018-09-11 PROCEDURE — 99283 EMERGENCY DEPT VISIT LOW MDM: CPT

## 2018-09-11 PROCEDURE — 81001 URINALYSIS AUTO W/SCOPE: CPT

## 2018-09-11 PROCEDURE — 81025 URINE PREGNANCY TEST: CPT

## 2018-09-11 PROCEDURE — 99284 EMERGENCY DEPT VISIT MOD MDM: CPT

## 2018-09-11 PROCEDURE — 87086 URINE CULTURE/COLONY COUNT: CPT

## 2018-09-11 PROCEDURE — 82962 GLUCOSE BLOOD TEST: CPT

## 2018-09-11 PROCEDURE — 80053 COMPREHEN METABOLIC PANEL: CPT

## 2018-09-11 RX ORDER — FLUTICASONE PROPIONATE 50 MCG
1 SPRAY, SUSPENSION NASAL
Qty: 1 | Refills: 0 | OUTPATIENT
Start: 2018-09-11 | End: 2018-09-20

## 2018-09-11 RX ORDER — CETIRIZINE HYDROCHLORIDE 10 MG/1
1 TABLET ORAL
Qty: 10 | Refills: 0 | OUTPATIENT
Start: 2018-09-11 | End: 2018-09-20

## 2018-09-11 RX ORDER — ACETAMINOPHEN 500 MG
650 TABLET ORAL ONCE
Qty: 0 | Refills: 0 | Status: COMPLETED | OUTPATIENT
Start: 2018-09-11 | End: 2018-09-11

## 2018-09-11 RX ADMIN — Medication 650 MILLIGRAM(S): at 23:21

## 2018-09-11 NOTE — ED PROVIDER NOTE - ATTENDING CONTRIBUTION TO CARE
19 yo female with acute abdominal pain. I personally saw the patient with the PA, and completed the key components of the history and physical exam. I then discussed the management plan with the PA.

## 2018-09-11 NOTE — ED PROVIDER NOTE - OBJECTIVE STATEMENT
18 year old female DMI on insulin and controlled. presenting to the ER with 3-4 days of lower abdominal pain described as sharp. states that she is 2 weeks late for her period. LMP was end of july. denies dysuria hematuria or vaginal discharge. states that she is also having sinus congestion and a headache. denies recent sick contacts or travel. taking tylenol with minimal relief of symptoms 18 year old female DMI on insulin and controlled. presenting to the ER with 3-4 days of lower abdominal pain described as sharp. states that she is 2 weeks late for her period. LMP was end of july. denies dysuria hematuria or vaginal discharge. states that she is also having sinus congestion and a headache. denies recent sick contacts or travel. taking tylenol with minimal relief of symptoms.   last sugar check 126

## 2018-09-12 VITALS
TEMPERATURE: 99 F | OXYGEN SATURATION: 100 % | SYSTOLIC BLOOD PRESSURE: 127 MMHG | DIASTOLIC BLOOD PRESSURE: 81 MMHG | HEART RATE: 84 BPM | RESPIRATION RATE: 20 BRPM

## 2018-09-14 LAB
CULTURE RESULTS: SIGNIFICANT CHANGE UP
SPECIMEN SOURCE: SIGNIFICANT CHANGE UP

## 2018-09-16 ENCOUNTER — EMERGENCY (EMERGENCY)
Facility: HOSPITAL | Age: 18
LOS: 1 days | Discharge: HOME | End: 2018-09-16
Attending: STUDENT IN AN ORGANIZED HEALTH CARE EDUCATION/TRAINING PROGRAM
Payer: MEDICAID

## 2018-09-16 VITALS
WEIGHT: 119.93 LBS | DIASTOLIC BLOOD PRESSURE: 98 MMHG | TEMPERATURE: 98 F | OXYGEN SATURATION: 98 % | HEART RATE: 113 BPM | HEIGHT: 63 IN | SYSTOLIC BLOOD PRESSURE: 147 MMHG | RESPIRATION RATE: 20 BRPM

## 2018-09-16 PROCEDURE — 99284 EMERGENCY DEPT VISIT MOD MDM: CPT

## 2018-09-16 RX ORDER — ONDANSETRON 8 MG/1
4 TABLET, FILM COATED ORAL ONCE
Qty: 0 | Refills: 0 | Status: COMPLETED | OUTPATIENT
Start: 2018-09-16 | End: 2018-09-16

## 2018-09-16 NOTE — ED ADULT TRIAGE NOTE - CHIEF COMPLAINT QUOTE
I was here on tuesday for stomach pain and I was throwing up and I took a pregnancy test at home ( I took 4, and 2 said positive 2 said negative)

## 2018-09-17 VITALS
DIASTOLIC BLOOD PRESSURE: 72 MMHG | TEMPERATURE: 99 F | OXYGEN SATURATION: 100 % | RESPIRATION RATE: 20 BRPM | HEART RATE: 95 BPM | SYSTOLIC BLOOD PRESSURE: 120 MMHG

## 2018-09-17 LAB
ALBUMIN SERPL ELPH-MCNC: 4.3 G/DL — SIGNIFICANT CHANGE UP (ref 3.3–5.2)
ALP SERPL-CCNC: 78 U/L — SIGNIFICANT CHANGE UP (ref 40–120)
ALT FLD-CCNC: 17 U/L — SIGNIFICANT CHANGE UP
ANION GAP SERPL CALC-SCNC: 11 MMOL/L — SIGNIFICANT CHANGE UP (ref 5–17)
APPEARANCE UR: ABNORMAL
AST SERPL-CCNC: 22 U/L — SIGNIFICANT CHANGE UP
BACTERIA # UR AUTO: ABNORMAL
BASOPHILS NFR BLD AUTO: 3 % — HIGH (ref 0–2)
BILIRUB SERPL-MCNC: 0.4 MG/DL — SIGNIFICANT CHANGE UP (ref 0.4–2)
BILIRUB UR-MCNC: NEGATIVE — SIGNIFICANT CHANGE UP
BUN SERPL-MCNC: 24 MG/DL — HIGH (ref 8–20)
CALCIUM SERPL-MCNC: 10 MG/DL — SIGNIFICANT CHANGE UP (ref 8.6–10.2)
CHLORIDE SERPL-SCNC: 99 MMOL/L — SIGNIFICANT CHANGE UP (ref 98–107)
CO2 SERPL-SCNC: 25 MMOL/L — SIGNIFICANT CHANGE UP (ref 22–29)
COLOR SPEC: YELLOW — SIGNIFICANT CHANGE UP
CREAT SERPL-MCNC: 0.56 MG/DL — SIGNIFICANT CHANGE UP (ref 0.5–1.3)
DIFF PNL FLD: NEGATIVE — SIGNIFICANT CHANGE UP
EPI CELLS # UR: ABNORMAL
GLUCOSE SERPL-MCNC: 122 MG/DL — HIGH (ref 70–115)
GLUCOSE UR QL: 1000 MG/DL
HCG SERPL-ACNC: <5 MIU/ML — SIGNIFICANT CHANGE UP
HCT VFR BLD CALC: 29.2 % — LOW (ref 37–47)
HGB BLD-MCNC: 9.2 G/DL — LOW (ref 12–16)
KETONES UR-MCNC: NEGATIVE — SIGNIFICANT CHANGE UP
LEUKOCYTE ESTERASE UR-ACNC: ABNORMAL
LYMPHOCYTES # BLD AUTO: 55 % — SIGNIFICANT CHANGE UP (ref 20–55)
MCHC RBC-ENTMCNC: 27.4 PG — SIGNIFICANT CHANGE UP (ref 27–31)
MCHC RBC-ENTMCNC: 31.5 G/DL — LOW (ref 32–36)
MCV RBC AUTO: 86.9 FL — SIGNIFICANT CHANGE UP (ref 81–99)
MONOCYTES NFR BLD AUTO: 2 % — LOW (ref 3–10)
NEUTROPHILS NFR BLD AUTO: 40 % — SIGNIFICANT CHANGE UP (ref 37–73)
NITRITE UR-MCNC: NEGATIVE — SIGNIFICANT CHANGE UP
PH UR: 6 — SIGNIFICANT CHANGE UP (ref 5–8)
PLAT MORPH BLD: NORMAL — SIGNIFICANT CHANGE UP
PLATELET # BLD AUTO: 481 K/UL — HIGH (ref 150–400)
POTASSIUM SERPL-MCNC: 4.3 MMOL/L — SIGNIFICANT CHANGE UP (ref 3.5–5.3)
POTASSIUM SERPL-SCNC: 4.3 MMOL/L — SIGNIFICANT CHANGE UP (ref 3.5–5.3)
PROT SERPL-MCNC: 7.5 G/DL — SIGNIFICANT CHANGE UP (ref 6.6–8.7)
PROT UR-MCNC: NEGATIVE MG/DL — SIGNIFICANT CHANGE UP
RBC # BLD: 3.36 M/UL — LOW (ref 4.4–5.2)
RBC # FLD: 15.3 % — SIGNIFICANT CHANGE UP (ref 11–15.6)
RBC BLD AUTO: NORMAL — SIGNIFICANT CHANGE UP
RBC CASTS # UR COMP ASSIST: NEGATIVE /HPF — SIGNIFICANT CHANGE UP (ref 0–4)
SODIUM SERPL-SCNC: 135 MMOL/L — SIGNIFICANT CHANGE UP (ref 135–145)
SP GR SPEC: 1.01 — SIGNIFICANT CHANGE UP (ref 1.01–1.02)
UROBILINOGEN FLD QL: NEGATIVE MG/DL — SIGNIFICANT CHANGE UP
WBC # BLD: 5.8 K/UL — SIGNIFICANT CHANGE UP (ref 4.8–10.8)
WBC # FLD AUTO: 5.8 K/UL — SIGNIFICANT CHANGE UP (ref 4.8–10.8)
WBC UR QL: SIGNIFICANT CHANGE UP

## 2018-09-17 PROCEDURE — 76830 TRANSVAGINAL US NON-OB: CPT

## 2018-09-17 PROCEDURE — 81001 URINALYSIS AUTO W/SCOPE: CPT

## 2018-09-17 PROCEDURE — 96374 THER/PROPH/DIAG INJ IV PUSH: CPT

## 2018-09-17 PROCEDURE — 76856 US EXAM PELVIC COMPLETE: CPT

## 2018-09-17 PROCEDURE — 80053 COMPREHEN METABOLIC PANEL: CPT

## 2018-09-17 PROCEDURE — 99284 EMERGENCY DEPT VISIT MOD MDM: CPT | Mod: 25

## 2018-09-17 PROCEDURE — 36415 COLL VENOUS BLD VENIPUNCTURE: CPT

## 2018-09-17 PROCEDURE — 76856 US EXAM PELVIC COMPLETE: CPT | Mod: 26

## 2018-09-17 PROCEDURE — 87086 URINE CULTURE/COLONY COUNT: CPT

## 2018-09-17 PROCEDURE — 85027 COMPLETE CBC AUTOMATED: CPT

## 2018-09-17 PROCEDURE — 84702 CHORIONIC GONADOTROPIN TEST: CPT

## 2018-09-17 PROCEDURE — 76830 TRANSVAGINAL US NON-OB: CPT | Mod: 26

## 2018-09-17 RX ORDER — SODIUM CHLORIDE 9 MG/ML
1000 INJECTION INTRAMUSCULAR; INTRAVENOUS; SUBCUTANEOUS ONCE
Qty: 0 | Refills: 0 | Status: COMPLETED | OUTPATIENT
Start: 2018-09-17 | End: 2018-09-17

## 2018-09-17 RX ADMIN — SODIUM CHLORIDE 1000 MILLILITER(S): 9 INJECTION INTRAMUSCULAR; INTRAVENOUS; SUBCUTANEOUS at 02:14

## 2018-09-17 RX ADMIN — ONDANSETRON 4 MILLIGRAM(S): 8 TABLET, FILM COATED ORAL at 00:16

## 2018-09-17 NOTE — ED PROVIDER NOTE - PHYSICAL EXAMINATION
Const: Awake, alert and oriented. In no acute distress. Well appearing.  HEENT: NC/AT. Moist mucous membranes.  Eyes: Conjunctiva pink, sclera white bilaterally EOMI.  Neck:. Soft and supple. Full ROM without pain.  Cardiac: +S1/S2. No murmurs.   Resp: Speaking in full sentences. No evidence of respiratory distress. No wheezes, rales or rhonchi.  Abd: Soft, lower suprapubic tenderness bilaterally on palpation, non-distended. Normal bowel sounds in all 4 quadrants. No guarding or rebound.  Back: Spine midline and non-tender. No CVAT.  Skin: No rashes, abrasions or lacerations.  Lymph: No cervical lymphadenopathy.  Neuro: Awake, alert & oriented x 3. Moves all extremities symmetrically.

## 2018-09-17 NOTE — ED PROVIDER NOTE - ATTENDING CONTRIBUTION TO CARE
17 yo female with acute abdominal pain with nausea and vomiting. I personally saw the patient with the PA, and completed the key components of the history and physical exam. I then discussed the management plan with the PA. 17 yo diabetic female with acute abdominal pain with nausea and vomiting. I personally saw the patient with the PA, and completed the key components of the history and physical exam. I then discussed the management plan with the PA.

## 2018-09-17 NOTE — ED ADULT NURSE NOTE - OBJECTIVE STATEMENT
abdominal pain with nausea and vomiting for several days was here a few days ago with same complaint

## 2018-09-17 NOTE — ED PROVIDER NOTE - OBJECTIVE STATEMENT
Patient is a 19 y/o female with a pmhx of diabetes type 1 (on insulin) presenting with suprapubic abdominal pain and vomiting. Patient admits to two episodes of vomiting (non-bloody, non-bilious). Patient states she has not had her menstrual period since July, states she was seen at [facility] four days ago for suprapubic pain, had pregnancy test that was negative. Patient states she took pregnancy test at home after discharge and it was positive. Patient denies cp, SOB, fevers, diarrhea, dysuria, vaginal bleeding.

## 2018-09-17 NOTE — ED ADULT NURSE NOTE - NSIMPLEMENTINTERV_GEN_ALL_ED
Implemented All Universal Safety Interventions:  [place] to call system. Call bell, personal items and telephone within reach. Instruct patient to call for assistance. Room bathroom lighting operational. Non-slip footwear when patient is off stretcher. Physically safe environment: no spills, clutter or unnecessary equipment. Stretcher in lowest position, wheels locked, appropriate side rails in place.

## 2018-09-17 NOTE — ED PROVIDER NOTE - PROGRESS NOTE DETAILS
reviewed ultrasound results, lab work and urine, pt reports improvement in sxs, copy of results printed for pt, pt explained results and d/c instructions

## 2018-09-18 LAB
CULTURE RESULTS: SIGNIFICANT CHANGE UP
SPECIMEN SOURCE: SIGNIFICANT CHANGE UP

## 2018-10-01 ENCOUNTER — OUTPATIENT (OUTPATIENT)
Dept: OUTPATIENT SERVICES | Facility: HOSPITAL | Age: 18
LOS: 1 days | End: 2018-10-01

## 2018-10-01 ENCOUNTER — OUTPATIENT (OUTPATIENT)
Dept: OUTPATIENT SERVICES | Facility: HOSPITAL | Age: 18
LOS: 1 days | End: 2018-10-01
Payer: MEDICAID

## 2018-10-01 PROCEDURE — G9001: CPT

## 2018-10-09 ENCOUNTER — INPATIENT (INPATIENT)
Facility: HOSPITAL | Age: 18
LOS: 4 days | Discharge: HOME | DRG: 438 | End: 2018-10-14
Attending: INTERNAL MEDICINE | Admitting: HOSPITALIST
Payer: MEDICAID

## 2018-10-09 VITALS
HEART RATE: 141 BPM | DIASTOLIC BLOOD PRESSURE: 82 MMHG | RESPIRATION RATE: 18 BRPM | SYSTOLIC BLOOD PRESSURE: 111 MMHG | OXYGEN SATURATION: 98 % | TEMPERATURE: 98 F

## 2018-10-09 DIAGNOSIS — N17.9 ACUTE KIDNEY FAILURE, UNSPECIFIED: ICD-10-CM

## 2018-10-09 LAB
ALBUMIN SERPL ELPH-MCNC: 4.7 G/DL — SIGNIFICANT CHANGE UP (ref 3.3–5.2)
ALP SERPL-CCNC: 99 U/L — SIGNIFICANT CHANGE UP (ref 40–120)
ALT FLD-CCNC: 29 U/L — SIGNIFICANT CHANGE UP
AMPHET UR-MCNC: NEGATIVE — SIGNIFICANT CHANGE UP
ANION GAP SERPL CALC-SCNC: 18 MMOL/L — HIGH (ref 5–17)
ANION GAP SERPL CALC-SCNC: 27 MMOL/L — HIGH (ref 5–17)
APTT BLD: 27 SEC — LOW (ref 27.5–37.4)
AST SERPL-CCNC: 40 U/L — HIGH
BARBITURATES UR SCN-MCNC: NEGATIVE — SIGNIFICANT CHANGE UP
BENZODIAZ UR-MCNC: NEGATIVE — SIGNIFICANT CHANGE UP
BILIRUB SERPL-MCNC: 0.3 MG/DL — LOW (ref 0.4–2)
BUN SERPL-MCNC: 20 MG/DL — SIGNIFICANT CHANGE UP (ref 8–20)
BUN SERPL-MCNC: 23 MG/DL — HIGH (ref 8–20)
CALCIUM SERPL-MCNC: 11.1 MG/DL — HIGH (ref 8.6–10.2)
CALCIUM SERPL-MCNC: 9.5 MG/DL — SIGNIFICANT CHANGE UP (ref 8.6–10.2)
CHLORIDE SERPL-SCNC: 103 MMOL/L — SIGNIFICANT CHANGE UP (ref 98–107)
CHLORIDE SERPL-SCNC: 105 MMOL/L — SIGNIFICANT CHANGE UP (ref 98–107)
CO2 SERPL-SCNC: 12 MMOL/L — LOW (ref 22–29)
CO2 SERPL-SCNC: 17 MMOL/L — LOW (ref 22–29)
COCAINE METAB.OTHER UR-MCNC: NEGATIVE — SIGNIFICANT CHANGE UP
CREAT SERPL-MCNC: 1.31 MG/DL — HIGH (ref 0.5–1.3)
CREAT SERPL-MCNC: 1.49 MG/DL — HIGH (ref 0.5–1.3)
D DIMER BLD IA.RAPID-MCNC: <150 NG/ML DDU — SIGNIFICANT CHANGE UP
GLUCOSE SERPL-MCNC: 155 MG/DL — HIGH (ref 70–115)
GLUCOSE SERPL-MCNC: 66 MG/DL — LOW (ref 70–115)
HCG SERPL QL: NEGATIVE — SIGNIFICANT CHANGE UP
HCG SERPL-ACNC: <5 MIU/ML — SIGNIFICANT CHANGE UP
HCT VFR BLD CALC: 40.5 % — SIGNIFICANT CHANGE UP (ref 37–47)
HGB BLD-MCNC: 12.9 G/DL — SIGNIFICANT CHANGE UP (ref 12–16)
INR BLD: 1.03 RATIO — SIGNIFICANT CHANGE UP (ref 0.88–1.16)
MAGNESIUM SERPL-MCNC: 2.2 MG/DL — SIGNIFICANT CHANGE UP (ref 1.6–2.6)
MCHC RBC-ENTMCNC: 27.6 PG — SIGNIFICANT CHANGE UP (ref 27–31)
MCHC RBC-ENTMCNC: 31.9 G/DL — LOW (ref 32–36)
MCV RBC AUTO: 86.7 FL — SIGNIFICANT CHANGE UP (ref 81–99)
METHADONE UR-MCNC: NEGATIVE — SIGNIFICANT CHANGE UP
NT-PROBNP SERPL-SCNC: 127 PG/ML — SIGNIFICANT CHANGE UP (ref 0–300)
OPIATES UR-MCNC: NEGATIVE — SIGNIFICANT CHANGE UP
PCP SPEC-MCNC: SIGNIFICANT CHANGE UP
PCP UR-MCNC: NEGATIVE — SIGNIFICANT CHANGE UP
PLATELET # BLD AUTO: 614 K/UL — HIGH (ref 150–400)
POTASSIUM SERPL-MCNC: 3.4 MMOL/L — LOW (ref 3.5–5.3)
POTASSIUM SERPL-MCNC: 3.6 MMOL/L — SIGNIFICANT CHANGE UP (ref 3.5–5.3)
POTASSIUM SERPL-SCNC: 3.4 MMOL/L — LOW (ref 3.5–5.3)
POTASSIUM SERPL-SCNC: 3.6 MMOL/L — SIGNIFICANT CHANGE UP (ref 3.5–5.3)
PROT SERPL-MCNC: 8.7 G/DL — SIGNIFICANT CHANGE UP (ref 6.6–8.7)
PROTHROM AB SERPL-ACNC: 11.3 SEC — SIGNIFICANT CHANGE UP (ref 9.8–12.7)
RBC # BLD: 4.67 M/UL — SIGNIFICANT CHANGE UP (ref 4.4–5.2)
RBC # FLD: 15.9 % — HIGH (ref 11–15.6)
SODIUM SERPL-SCNC: 140 MMOL/L — SIGNIFICANT CHANGE UP (ref 135–145)
SODIUM SERPL-SCNC: 142 MMOL/L — SIGNIFICANT CHANGE UP (ref 135–145)
THC UR QL: NEGATIVE — SIGNIFICANT CHANGE UP
TSH SERPL-MCNC: 2.5 UIU/ML — SIGNIFICANT CHANGE UP (ref 0.27–4.2)
WBC # BLD: 6 K/UL — SIGNIFICANT CHANGE UP (ref 4.8–10.8)
WBC # FLD AUTO: 6 K/UL — SIGNIFICANT CHANGE UP (ref 4.8–10.8)

## 2018-10-09 PROCEDURE — 74176 CT ABD & PELVIS W/O CONTRAST: CPT | Mod: 26

## 2018-10-09 PROCEDURE — 99223 1ST HOSP IP/OBS HIGH 75: CPT

## 2018-10-09 PROCEDURE — 71045 X-RAY EXAM CHEST 1 VIEW: CPT | Mod: 26

## 2018-10-09 PROCEDURE — 99284 EMERGENCY DEPT VISIT MOD MDM: CPT

## 2018-10-09 RX ORDER — INSULIN GLARGINE 100 [IU]/ML
25 INJECTION, SOLUTION SUBCUTANEOUS AT BEDTIME
Qty: 0 | Refills: 0 | Status: DISCONTINUED | OUTPATIENT
Start: 2018-10-09 | End: 2018-10-10

## 2018-10-09 RX ORDER — INSULIN LISPRO 100/ML
VIAL (ML) SUBCUTANEOUS
Qty: 0 | Refills: 0 | Status: DISCONTINUED | OUTPATIENT
Start: 2018-10-09 | End: 2018-10-14

## 2018-10-09 RX ORDER — DEXTROSE 50 % IN WATER 50 %
50 SYRINGE (ML) INTRAVENOUS ONCE
Qty: 0 | Refills: 0 | Status: COMPLETED | OUTPATIENT
Start: 2018-10-09 | End: 2018-10-09

## 2018-10-09 RX ORDER — SODIUM CHLORIDE 9 MG/ML
2000 INJECTION INTRAMUSCULAR; INTRAVENOUS; SUBCUTANEOUS ONCE
Qty: 0 | Refills: 0 | Status: COMPLETED | OUTPATIENT
Start: 2018-10-09 | End: 2018-10-09

## 2018-10-09 RX ORDER — DEXTROSE 50 % IN WATER 50 %
12.5 SYRINGE (ML) INTRAVENOUS ONCE
Qty: 0 | Refills: 0 | Status: DISCONTINUED | OUTPATIENT
Start: 2018-10-09 | End: 2018-10-14

## 2018-10-09 RX ORDER — DEXTROSE 50 % IN WATER 50 %
25 SYRINGE (ML) INTRAVENOUS ONCE
Qty: 0 | Refills: 0 | Status: DISCONTINUED | OUTPATIENT
Start: 2018-10-09 | End: 2018-10-14

## 2018-10-09 RX ORDER — METOCLOPRAMIDE HCL 10 MG
10 TABLET ORAL ONCE
Qty: 0 | Refills: 0 | Status: COMPLETED | OUTPATIENT
Start: 2018-10-09 | End: 2018-10-09

## 2018-10-09 RX ORDER — SODIUM CHLORIDE 9 MG/ML
1000 INJECTION, SOLUTION INTRAVENOUS
Qty: 0 | Refills: 0 | Status: DISCONTINUED | OUTPATIENT
Start: 2018-10-09 | End: 2018-10-14

## 2018-10-09 RX ORDER — MORPHINE SULFATE 50 MG/1
2 CAPSULE, EXTENDED RELEASE ORAL EVERY 6 HOURS
Qty: 0 | Refills: 0 | Status: DISCONTINUED | OUTPATIENT
Start: 2018-10-09 | End: 2018-10-14

## 2018-10-09 RX ORDER — GLUCAGON INJECTION, SOLUTION 0.5 MG/.1ML
1 INJECTION, SOLUTION SUBCUTANEOUS ONCE
Qty: 0 | Refills: 0 | Status: DISCONTINUED | OUTPATIENT
Start: 2018-10-09 | End: 2018-10-14

## 2018-10-09 RX ORDER — ACETAMINOPHEN 500 MG
1000 TABLET ORAL ONCE
Qty: 0 | Refills: 0 | Status: COMPLETED | OUTPATIENT
Start: 2018-10-09 | End: 2018-10-09

## 2018-10-09 RX ORDER — ALBUTEROL 90 UG/1
2 AEROSOL, METERED ORAL EVERY 6 HOURS
Qty: 0 | Refills: 0 | Status: DISCONTINUED | OUTPATIENT
Start: 2018-10-09 | End: 2018-10-14

## 2018-10-09 RX ORDER — HYDROMORPHONE HYDROCHLORIDE 2 MG/ML
1 INJECTION INTRAMUSCULAR; INTRAVENOUS; SUBCUTANEOUS ONCE
Qty: 0 | Refills: 0 | Status: DISCONTINUED | OUTPATIENT
Start: 2018-10-09 | End: 2018-10-09

## 2018-10-09 RX ORDER — INSULIN HUMAN 100 [IU]/ML
5 INJECTION, SOLUTION SUBCUTANEOUS ONCE
Qty: 0 | Refills: 0 | Status: COMPLETED | OUTPATIENT
Start: 2018-10-09 | End: 2018-10-09

## 2018-10-09 RX ORDER — DEXTROSE 50 % IN WATER 50 %
15 SYRINGE (ML) INTRAVENOUS ONCE
Qty: 0 | Refills: 0 | Status: DISCONTINUED | OUTPATIENT
Start: 2018-10-09 | End: 2018-10-14

## 2018-10-09 RX ORDER — ONDANSETRON 8 MG/1
4 TABLET, FILM COATED ORAL EVERY 6 HOURS
Qty: 0 | Refills: 0 | Status: DISCONTINUED | OUTPATIENT
Start: 2018-10-09 | End: 2018-10-14

## 2018-10-09 RX ORDER — SODIUM CHLORIDE 9 MG/ML
1000 INJECTION INTRAMUSCULAR; INTRAVENOUS; SUBCUTANEOUS ONCE
Qty: 0 | Refills: 0 | Status: COMPLETED | OUTPATIENT
Start: 2018-10-09 | End: 2018-10-09

## 2018-10-09 RX ADMIN — SODIUM CHLORIDE 1000 MILLILITER(S): 9 INJECTION INTRAMUSCULAR; INTRAVENOUS; SUBCUTANEOUS at 11:51

## 2018-10-09 RX ADMIN — HYDROMORPHONE HYDROCHLORIDE 1 MILLIGRAM(S): 2 INJECTION INTRAMUSCULAR; INTRAVENOUS; SUBCUTANEOUS at 22:38

## 2018-10-09 RX ADMIN — SODIUM CHLORIDE 2000 MILLILITER(S): 9 INJECTION INTRAMUSCULAR; INTRAVENOUS; SUBCUTANEOUS at 14:37

## 2018-10-09 RX ADMIN — INSULIN HUMAN 5 UNIT(S): 100 INJECTION, SOLUTION SUBCUTANEOUS at 23:11

## 2018-10-09 RX ADMIN — HYDROMORPHONE HYDROCHLORIDE 1 MILLIGRAM(S): 2 INJECTION INTRAMUSCULAR; INTRAVENOUS; SUBCUTANEOUS at 22:36

## 2018-10-09 RX ADMIN — SODIUM CHLORIDE 2000 MILLILITER(S): 9 INJECTION INTRAMUSCULAR; INTRAVENOUS; SUBCUTANEOUS at 11:51

## 2018-10-09 RX ADMIN — Medication 50 MILLILITER(S): at 11:51

## 2018-10-09 RX ADMIN — Medication 10 MILLIGRAM(S): at 14:37

## 2018-10-09 RX ADMIN — Medication 400 MILLIGRAM(S): at 14:37

## 2018-10-09 RX ADMIN — Medication 1000 MILLIGRAM(S): at 14:50

## 2018-10-09 NOTE — ED ADULT TRIAGE NOTE - CHIEF COMPLAINT QUOTE
Patient c/o mid sternal chest pain x 3 days. Patient states that pain is worse with deep breathes. Denies any SOB. Patient c/o mid sternal chest pain x 3 days. Patient states that pain is worse with deep breathes. Patient states that she is also having abdominal pain as well. Denies any SOB.

## 2018-10-09 NOTE — ED ADULT NURSE NOTE - NSIMPLEMENTINTERV_GEN_ALL_ED
Implemented All Universal Safety Interventions:  [place] to call system. Call bell, personal items and telephone within reach. Instruct patient to call for assistance. Room bathroom lighting operational. Non-slip footwear when patient is off stretcher. Physically safe environment: no spills, clutter or unnecessary equipment. Stretcher in lowest position, wheels locked, appropriate side rails in place.

## 2018-10-09 NOTE — ED PROVIDER NOTE - CARE PLAN
Principal Discharge DX:	CHALINO (acute kidney injury)  Secondary Diagnosis:	Severe dehydration  Secondary Diagnosis:	Hepatomegaly

## 2018-10-09 NOTE — ED ADULT NURSE NOTE - CHIEF COMPLAINT QUOTE
Patient c/o mid sternal chest pain x 3 days. Patient states that pain is worse with deep breathes. Patient states that she is also having abdominal pain as well. Denies any SOB.

## 2018-10-09 NOTE — ED ADULT NURSE NOTE - OBJECTIVE STATEMENT
pt arrived with chest pain and abd pain for 3 days pt states pain worse with inspiration , pt denies fever, denies chills

## 2018-10-09 NOTE — ED PROVIDER NOTE - OBJECTIVE STATEMENT
17 y/o F hx of DM presents with n/v epigastric pain left sided abd pain and sob not able to take or keep down any po denies any fevers no hx of dvt or pe

## 2018-10-10 DIAGNOSIS — R10.13 EPIGASTRIC PAIN: ICD-10-CM

## 2018-10-10 DIAGNOSIS — R16.0 HEPATOMEGALY, NOT ELSEWHERE CLASSIFIED: ICD-10-CM

## 2018-10-10 LAB
AFP-TM SERPL-MCNC: 2.5 NG/ML — SIGNIFICANT CHANGE UP
ALBUMIN SERPL ELPH-MCNC: 3.6 G/DL — SIGNIFICANT CHANGE UP (ref 3.3–5.2)
ALP SERPL-CCNC: 69 U/L — SIGNIFICANT CHANGE UP (ref 40–120)
ALT FLD-CCNC: 27 U/L — SIGNIFICANT CHANGE UP
AMYLASE P1 CFR SERPL: 119 U/L — SIGNIFICANT CHANGE UP (ref 36–128)
ANION GAP SERPL CALC-SCNC: 17 MMOL/L — SIGNIFICANT CHANGE UP (ref 5–17)
AST SERPL-CCNC: 79 U/L — HIGH
BILIRUB SERPL-MCNC: 0.3 MG/DL — LOW (ref 0.4–2)
BUN SERPL-MCNC: 16 MG/DL — SIGNIFICANT CHANGE UP (ref 8–20)
CALCIUM SERPL-MCNC: 8.8 MG/DL — SIGNIFICANT CHANGE UP (ref 8.6–10.2)
CHLORIDE SERPL-SCNC: 106 MMOL/L — SIGNIFICANT CHANGE UP (ref 98–107)
CO2 SERPL-SCNC: 17 MMOL/L — LOW (ref 22–29)
CREAT SERPL-MCNC: 1.05 MG/DL — SIGNIFICANT CHANGE UP (ref 0.5–1.3)
GLUCOSE BLDC GLUCOMTR-MCNC: 100 MG/DL — HIGH (ref 70–99)
GLUCOSE BLDC GLUCOMTR-MCNC: 109 MG/DL — HIGH (ref 70–99)
GLUCOSE BLDC GLUCOMTR-MCNC: 173 MG/DL — HIGH (ref 70–99)
GLUCOSE BLDC GLUCOMTR-MCNC: 195 MG/DL — HIGH (ref 70–99)
GLUCOSE BLDC GLUCOMTR-MCNC: 202 MG/DL — HIGH (ref 70–99)
GLUCOSE BLDC GLUCOMTR-MCNC: 424 MG/DL — HIGH (ref 70–99)
GLUCOSE BLDC GLUCOMTR-MCNC: 43 MG/DL — CRITICAL LOW (ref 70–99)
GLUCOSE BLDC GLUCOMTR-MCNC: 43 MG/DL — CRITICAL LOW (ref 70–99)
GLUCOSE BLDC GLUCOMTR-MCNC: 47 MG/DL — LOW (ref 70–99)
GLUCOSE SERPL-MCNC: 126 MG/DL — HIGH (ref 70–115)
HBA1C BLD-MCNC: 12 % — HIGH (ref 4–5.6)
HCT VFR BLD CALC: 28.7 % — LOW (ref 37–47)
HGB BLD-MCNC: 9.5 G/DL — LOW (ref 12–16)
LIDOCAIN IGE QN: 271 U/L — HIGH (ref 22–51)
LIDOCAIN IGE QN: 548 U/L — HIGH (ref 22–51)
MCHC RBC-ENTMCNC: 27.8 PG — SIGNIFICANT CHANGE UP (ref 27–31)
MCHC RBC-ENTMCNC: 33.1 G/DL — SIGNIFICANT CHANGE UP (ref 32–36)
MCV RBC AUTO: 83.9 FL — SIGNIFICANT CHANGE UP (ref 81–99)
PLATELET # BLD AUTO: 413 K/UL — HIGH (ref 150–400)
POTASSIUM SERPL-MCNC: 2.4 MMOL/L — CRITICAL LOW (ref 3.5–5.3)
POTASSIUM SERPL-SCNC: 2.4 MMOL/L — CRITICAL LOW (ref 3.5–5.3)
PROT SERPL-MCNC: 6.2 G/DL — LOW (ref 6.6–8.7)
RAPID RVP RESULT: SIGNIFICANT CHANGE UP
RBC # BLD: 3.42 M/UL — LOW (ref 4.4–5.2)
RBC # FLD: 15.2 % — SIGNIFICANT CHANGE UP (ref 11–15.6)
SODIUM SERPL-SCNC: 140 MMOL/L — SIGNIFICANT CHANGE UP (ref 135–145)
WBC # BLD: 4 K/UL — LOW (ref 4.8–10.8)
WBC # FLD AUTO: 4 K/UL — LOW (ref 4.8–10.8)

## 2018-10-10 PROCEDURE — 99222 1ST HOSP IP/OBS MODERATE 55: CPT

## 2018-10-10 PROCEDURE — 99223 1ST HOSP IP/OBS HIGH 75: CPT

## 2018-10-10 PROCEDURE — 99233 SBSQ HOSP IP/OBS HIGH 50: CPT

## 2018-10-10 RX ORDER — SODIUM CHLORIDE 9 MG/ML
1000 INJECTION, SOLUTION INTRAVENOUS
Qty: 0 | Refills: 0 | Status: DISCONTINUED | OUTPATIENT
Start: 2018-10-10 | End: 2018-10-14

## 2018-10-10 RX ORDER — SODIUM CHLORIDE 9 MG/ML
1000 INJECTION, SOLUTION INTRAVENOUS
Qty: 0 | Refills: 0 | Status: DISCONTINUED | OUTPATIENT
Start: 2018-10-10 | End: 2018-10-10

## 2018-10-10 RX ORDER — INFLUENZA VIRUS VACCINE 15; 15; 15; 15 UG/.5ML; UG/.5ML; UG/.5ML; UG/.5ML
0.5 SUSPENSION INTRAMUSCULAR ONCE
Qty: 0 | Refills: 0 | Status: COMPLETED | OUTPATIENT
Start: 2018-10-10 | End: 2018-10-14

## 2018-10-10 RX ORDER — INSULIN GLARGINE 100 [IU]/ML
12 INJECTION, SOLUTION SUBCUTANEOUS AT BEDTIME
Qty: 0 | Refills: 0 | Status: DISCONTINUED | OUTPATIENT
Start: 2018-10-10 | End: 2018-10-14

## 2018-10-10 RX ORDER — POTASSIUM CHLORIDE 20 MEQ
20 PACKET (EA) ORAL
Qty: 0 | Refills: 0 | Status: COMPLETED | OUTPATIENT
Start: 2018-10-10 | End: 2018-10-10

## 2018-10-10 RX ORDER — PANTOPRAZOLE SODIUM 20 MG/1
40 TABLET, DELAYED RELEASE ORAL
Qty: 0 | Refills: 0 | Status: DISCONTINUED | OUTPATIENT
Start: 2018-10-10 | End: 2018-10-14

## 2018-10-10 RX ORDER — DEXTROSE 50 % IN WATER 50 %
25 SYRINGE (ML) INTRAVENOUS ONCE
Qty: 0 | Refills: 0 | Status: COMPLETED | OUTPATIENT
Start: 2018-10-10 | End: 2018-10-10

## 2018-10-10 RX ORDER — SODIUM CHLORIDE 9 MG/ML
1000 INJECTION INTRAMUSCULAR; INTRAVENOUS; SUBCUTANEOUS
Qty: 0 | Refills: 0 | Status: DISCONTINUED | OUTPATIENT
Start: 2018-10-10 | End: 2018-10-10

## 2018-10-10 RX ORDER — INSULIN LISPRO 100/ML
5 VIAL (ML) SUBCUTANEOUS
Qty: 0 | Refills: 0 | Status: DISCONTINUED | OUTPATIENT
Start: 2018-10-10 | End: 2018-10-10

## 2018-10-10 RX ADMIN — SODIUM CHLORIDE 100 MILLILITER(S): 9 INJECTION INTRAMUSCULAR; INTRAVENOUS; SUBCUTANEOUS at 01:39

## 2018-10-10 RX ADMIN — PANTOPRAZOLE SODIUM 40 MILLIGRAM(S): 20 TABLET, DELAYED RELEASE ORAL at 11:58

## 2018-10-10 RX ADMIN — MORPHINE SULFATE 2 MILLIGRAM(S): 50 CAPSULE, EXTENDED RELEASE ORAL at 05:15

## 2018-10-10 RX ADMIN — Medication 50 MILLIEQUIVALENT(S): at 13:00

## 2018-10-10 RX ADMIN — Medication 25 GRAM(S): at 12:23

## 2018-10-10 RX ADMIN — MORPHINE SULFATE 2 MILLIGRAM(S): 50 CAPSULE, EXTENDED RELEASE ORAL at 05:30

## 2018-10-10 RX ADMIN — Medication 50 MILLIEQUIVALENT(S): at 09:37

## 2018-10-10 RX ADMIN — PANTOPRAZOLE SODIUM 40 MILLIGRAM(S): 20 TABLET, DELAYED RELEASE ORAL at 17:49

## 2018-10-10 RX ADMIN — INSULIN GLARGINE 25 UNIT(S): 100 INJECTION, SOLUTION SUBCUTANEOUS at 00:12

## 2018-10-10 RX ADMIN — SODIUM CHLORIDE 120 MILLILITER(S): 9 INJECTION, SOLUTION INTRAVENOUS at 03:45

## 2018-10-10 RX ADMIN — INSULIN GLARGINE 12 UNIT(S): 100 INJECTION, SOLUTION SUBCUTANEOUS at 20:49

## 2018-10-10 RX ADMIN — SODIUM CHLORIDE 75 MILLILITER(S): 9 INJECTION, SOLUTION INTRAVENOUS at 17:49

## 2018-10-10 RX ADMIN — MORPHINE SULFATE 2 MILLIGRAM(S): 50 CAPSULE, EXTENDED RELEASE ORAL at 17:00

## 2018-10-10 RX ADMIN — MORPHINE SULFATE 2 MILLIGRAM(S): 50 CAPSULE, EXTENDED RELEASE ORAL at 16:34

## 2018-10-10 RX ADMIN — Medication 25 GRAM(S): at 07:45

## 2018-10-10 NOTE — ADVANCED PRACTICE NURSE CONSULT - RECOMMEDATIONS
continue diabetes self management education  lower lantus qhs to 15 units.    bg monitoring q4 hrs, hypoglycemia unawareness  once pt is eating basal bolus insulin

## 2018-10-10 NOTE — CONSULT NOTE ADULT - SUBJECTIVE AND OBJECTIVE BOX
HPI:  18 year old patient with PMH of DM-1 and intermittent asthma came to the ED with the complaint of abdominal pain x 1 week. Pain is sharp, located in the epigastric region radiating to the back. Pain is exacerbated with food, alleviated with Motrin. She reported associated nausea and vomiting of 2 days. Vomitus is non-blood, non-billous. She has no fever, chills, chest pain, shortness of breath, palpitation, no diarrhea, dysuria, hematuria, no sick contact, no recent travel or change in diet. She reported similar episode in the past. (10 Oct 2018 00:04)  h/o DM type 1 since age 5. diabetes complicated by episodes of DKA and chronic poor control. Pt. states that she is on lantus 25 units and 12 units of prandial insulin, but adherence with this regimen is questionable.   One pregnancy, complicated by hypertension and premature delivery by .  IN hospital has had erratic glycemic control, with hypoglycemia and hyperglycemia.      PAST MEDICAL & SURGICAL HISTORY:  Diabetes: type I  Asthma   delivery delivered      FAMILY HISTORY:  Family history of diabetes mellitus in father (Father)      SOCIAL HISTORY:    REVIEW OF SYSTEMS:  anorexia, nausea, abdominal discomfort, fatigue. Otherwise negative.    MEDICATIONS  (STANDING):  dextrose 5% + sodium chloride 0.9%. 1000 milliLiter(s) (75 mL/Hr) IV Continuous <Continuous>  dextrose 5%. 1000 milliLiter(s) (50 mL/Hr) IV Continuous <Continuous>  dextrose 50% Injectable 12.5 Gram(s) IV Push once  dextrose 50% Injectable 25 Gram(s) IV Push once  dextrose 50% Injectable 25 Gram(s) IV Push once  influenza   Vaccine 0.5 milliLiter(s) IntraMuscular once  insulin glargine Injectable (LANTUS) 12 Unit(s) SubCutaneous at bedtime  insulin lispro (HumaLOG) corrective regimen sliding scale   SubCutaneous three times a day before meals  lactated ringers. 1000 milliLiter(s) (100 mL/Hr) IV Continuous <Continuous>  pantoprazole  Injectable 40 milliGRAM(s) IV Push two times a day    MEDICATIONS  (PRN):  ALBUTerol    90 MICROgram(s) HFA Inhaler 2 Puff(s) Inhalation every 6 hours PRN Shortness of Breath and/or Wheezing  dextrose 40% Gel 15 Gram(s) Oral once PRN Blood Glucose LESS THAN 70 milliGRAM(s)/deciliter  glucagon  Injectable 1 milliGRAM(s) IntraMuscular once PRN Glucose LESS THAN 70 milligrams/deciliter  morphine  - Injectable 2 milliGRAM(s) IV Push every 6 hours PRN Moderate Pain (4 - 6)  ondansetron Injectable 4 milliGRAM(s) IV Push every 6 hours PRN Nausea and/or Vomiting      Allergies    amoxicillin (Hives)  Mushrooms (Hives)  penicillin (Unknown)  Prozac (Unknown)    Intolerances          PHYSICAL EXAM:    Vital Signs Last 24 Hrs  T(C): 36.7 (10 Oct 2018 16:11), Max: 98.6 (10 Oct 2018 05:05)  T(F): 98.1 (10 Oct 2018 16:11), Max: 209.4 (10 Oct 2018 05:05)  HR: 96 (10 Oct 2018 17:34) (90 - 110)  BP: 111/78 (10 Oct 2018 16:11) (103/68 - 127/88)  BP(mean): --  RR: 18 (10 Oct 2018 16:11) (16 - 20)  SpO2: 99% (10 Oct 2018 16:11) (99% - 99%)    General appearance: Fatigued, thin    Eyes: Pupils equal. EOM full. No exophthalmos.    Neck: Trachea midline. No thyroid enlargement.    Lungs: Normal respiratory excursion. Lungs clear.    CV: Regular cardiac rhythm. No murmur.     Abdomen: Soft, mildly tender    Musculoskeletal: No cyanosis, clubbing, or edema.     Skin: Warm and dry    Neuro: Cranial nerves intact. Normal motor function.     Psych: Normal affect, questionable judgement.            LABS:                        9.5    4.0   )-----------( 413      ( 10 Oct 2018 08:21 )             28.7     10-10    140  |  106  |  16.0  ----------------------------<  126<H>  2.4<LL>   |  17.0<L>  |  1.05    Ca    8.8      10 Oct 2018 08:21  Mg     2.2     10-09    TPro  6.2<L>  /  Alb  3.6  /  TBili  0.3<L>  /  DBili  x   /  AST  79<H>  /  ALT  27  /  AlkPhos  69  10-10      LIVER FUNCTIONS - ( 10 Oct 2018 08:21 )  Alb: 3.6 g/dL / Pro: 6.2 g/dL / ALK PHOS: 69 U/L / ALT: 27 U/L / AST: 79 U/L / GGT: x           Hemoglobin A1C, Whole Blood: 12.0 % (10-10 @ 08:21)    lipase >500    POCT Blood Glucose.: 109 mg/dL (10-10-18 @ 16:12)  POCT Blood Glucose.: 195 mg/dL (10-10-18 @ 12:33)  POCT Blood Glucose.: 47 mg/dL (10-10-18 @ 12:04)  POCT Blood Glucose.: 43 mg/dL (10-10-18 @ 12:00)  POCT Blood Glucose.: 202 mg/dL (10-10-18 @ 07:56)  POCT Blood Glucose.: 43 mg/dL (10-10-18 @ 07:35)  POCT Blood Glucose.: 173 mg/dL (10-10-18 @ 01:37)  POCT Blood Glucose.: 424 mg/dL (10-10-18 @ 00:21)  POCT Blood Glucose.: 457 mg/dL (10-09-18 @ 21:13)  POCT Blood Glucose.: 70 mg/dL (10-09-18 @ 11:33)      RADIOLOGY & ADDITIONAL STUDIES:  marked hepatomegaly and chronic solid lesion in liver. No radiologic evidence of pancreatitis

## 2018-10-10 NOTE — PROGRESS NOTE PEDS - ASSESSMENT
18 year old patient with PMH of DM-1 and intermittent asthma came to the ED with the complaint of abdominal pain x 1 week. Pain is sharp, located in the epigastric region radiating to the back. Pain is exacerbated with food, alleviated with Motrin. She reported associated nausea and vomiting of 2 days. Vomitus is non-blood, non-billous. She has no fever, chills, chest pain, shortness of breath, palpitation, no diarrhea, dysuria, hematuria, no sick contact, no recent travel or change in diet. She reported similar episode in the past.       1) Epigastric pain likely due to acute pancreatitis   - Lipase 548 now trending down, repeat level in am  - Will continue pain medication with morphine PRN   - Zofran PRN for nausea/vomit  - IVF with LR @125ml/hr and keep npo for now  - per GI will rule PUD, EGD pending    2) Hepatomegaly/hepatic lesion  - CT abdomen showed: marked hepatomegaly. Solid lesion in the right lobe of the liver  - GI recs appreciated  - MRI peding    3) Hypokalemia  - replace and monitor level    4) CHALINO likely due to dehydration: resolved    5) DM-1  - Continue Lantus at half home dose 12units HS while npo  - Will hold Novolog 5units TID while patient NPO   - Insulin sliding scale     6) Asthma: stable   - Albuterol PRN     7) Anemia   - seems to be at baseline  - likely due to menstruation  - will obtain iron studies      8) Prophylactic Measure:  - DVT prophylaxis: ambulate

## 2018-10-10 NOTE — H&P ADULT - ASSESSMENT
18 year old patient with PMH of DM-1 and intermittent asthma came to the ED with the complaint of abdominal pain x 1 week.  Found to have CHALINO in the ED     CHALINO likely due to dehydration  IVF given in the ED  Will continue gently hydration with NS @100ml/hr and reassess in 12 hours   Avoid nephrogenic agent   Consider getting renal sono if no improvement     Intractable abdominal pain   CT pelvis done   Will continue pain medication with morphine PRN   Will check lipase  Zofran PRN for nausea/vomit    DM-1  Continue Lantus 25units HS  Novolog 5units TID  Insulin sliding scale     Hepatomegaly/hepatic lesion  CT abdomen showed: marked hepatomegaly. Solid lesion in the right lobe of the liver  GI consult     Asthma   Albuterol PRN     Supportive  DVT prophylaxis: ambulate  GI prophylaxis: not indicated  Diet: consistent carbohydrate 18 year old patient with PMH of DM-1 and intermittent asthma came to the ED with the complaint of abdominal pain x 1 week.  Found to have CHALINO in the ED     Epigastric pain likely due to acute pancreatitis   Lipase 548  Will continue pain medication with morphine PRN   Zofran PRN for nausea/vomit  IVF with LR @120ml/hr     CHALINO likely due to dehydration  IVF given in the ED  Will continue gently hydration with NS @100ml/hr and reassess in 12 hours   Avoid nephrogenic agent   Consider getting renal sono if no improvement     DM-1  Continue Lantus 25units HS  Will hold Novolog 5units TID while patient NPO   Insulin sliding scale     Hepatomegaly/hepatic lesion  CT abdomen showed: marked hepatomegaly. Solid lesion in the right lobe of the liver  GI consult     Asthma   Albuterol PRN     Supportive  DVT prophylaxis: ambulate  GI prophylaxis: not indicated  Diet: NPO

## 2018-10-10 NOTE — ADVANCED PRACTICE NURSE CONSULT - ASSESSMENT
pt is a+ox3 c/o 0 pain resting comfortably in bed family @ bedside providing comfort. pt states she has type 1 and fu w dr hidalgo. she takes lantus 25 units qhs and novolog 12 units pre meal. she does nt know her last a1c result. pt had 2 episods of hypoglycemia and stated she did nt feel any ss of hypoglycemia

## 2018-10-10 NOTE — CONSULT NOTE ADULT - ASSESSMENT
DM type 1, chronically uncontrolled, with mild DKA on admission. Unclear if this is the etiology of abdominal pain. Unlikely that she requires as much insulin as she states that she uses; suspect that she frequently omits doses and then requires intermittent high doses to correct for hyperglycemia. Agree with reduction in lantus dose. Will monitor and attempt to educate and offer outpatient follow up.  Hypokalemia noted following resolution of DKA.

## 2018-10-10 NOTE — ED ADULT NURSE REASSESSMENT NOTE - NS ED NURSE REASSESS COMMENT FT1
spoke with admitting md informed her pt sugar still high as per md go by sliding scale and give humalog, and recheck sugar in a hour spoke with admitting md informed her pt sugar still high as per md give humalog 6 unit by sliding scale, and recheck sugar in a hour

## 2018-10-10 NOTE — CONSULT NOTE PEDS - SUBJECTIVE AND OBJECTIVE BOX
Patient is a 18y old  Female who presents with a chief complaint of Epigastric pain (10 Oct 2018 00:04)      HPI:  18 year old patient with PMH of DM-1 and intermittent asthma came to the ED with the complaint of epigastric pain x 1 week. Pain is sharp, located in the epigastric region radiating to the back. Pain is exacerbated with food on one occasion only and lessened  with Motrin which she has been taking daily at a dose of two OTC motrin qday.  She reported associated nausea and vomiting of 2 days. Vomitus is non-blood, non-billous and no vomiting for two days. The pain has been almost constant but waxes and wanes. She has no fever, chills, chest pain, shortness of breath, palpitation, no diarrhea, dysuria, hematuria, no sick contact, no recent travel or change in diet. She reported similar episode in the past but denied to me but a cat scan of the abdomen and pelvis was done this past August that was done for evaluation of epigastric and RUQ pain and was essentially unremarkable and was done with oral and IV contast. In ER [atinet had BS over 400 and was acidotic. She was treated with fluids and insulin and the acidosis has resolved. The lipase was elevated at about 500 but no amylase done. Ths morning the pain is much less. She was never placed on pantoprazole. A cat scan done without any contrast due to an elevated creatinine showe a slightly enlarge liver and a 2.5cm defect in the right lobe of the over that in retrospect was present in August and is unchanged. Recent LFTs werwe normal but yesterday the AST was 40.      REVIEW OF SYSTEMS:    CONSTITUTIONAL: No fever, weight loss, or fatigue  EYES: No eye pain, visual disturbances, or discharge  ENMT:  No difficulty hearing, tinnitus, vertigo; No sinus or throat pain  NECK: No pain or stiffness  RESPIRATORY: No cough, wheezing, chills or hemoptysis; No shortness of breath  CARDIOVASCULAR: No chest pain, palpitations, dizziness, or leg swelling  GASTROINTESTINAL: as above  NEUROLOGICAL: No headaches, memory loss, loss of strength, numbness, or tremors  SKIN: No itching, burning, rashes, or lesions   LYMPH NODES: No enlarged glands  MUSCULOSKELETAL: No joint pain or swelling; No muscle, back, or extremity pain  PSYCHIATRIC: No depression, anxiety, mood swings, or difficulty sleeping  HEME/LYMPH: No easy bruising, or bleeding gums  ALLERY AND IMMUNOLOGIC: No hives or eczema      PAST MEDICAL & SURGICAL HISTORY:  Diabetes: type I  Asthma   delivery delivered      FAMILY HISTORY:  Family history of diabetes mellitus in father (Father)      SOCIAL HISTORY:  Smoking Status: [ ] Current, [ ] Former, [ ] Never  Pack Years:  Alcohol Use:denies    Home Medications:  Lantus Solostar Pen 100 units/mL subcutaneous solution: 25 unit(s) subcutaneous once a day (at bedtime) (17 Sep 2018 06:32)      MEDICATIONS:  MEDICATIONS  (STANDING):  dextrose 5%. 1000 milliLiter(s) (50 mL/Hr) IV Continuous <Continuous>  dextrose 50% Injectable 12.5 Gram(s) IV Push once  dextrose 50% Injectable 25 Gram(s) IV Push once  dextrose 50% Injectable 25 Gram(s) IV Push once  influenza   Vaccine 0.5 milliLiter(s) IntraMuscular once  insulin glargine Injectable (LANTUS) 25 Unit(s) SubCutaneous at bedtime  insulin lispro (HumaLOG) corrective regimen sliding scale   SubCutaneous three times a day before meals  lactated ringers. 1000 milliLiter(s) (120 mL/Hr) IV Continuous <Continuous>  pantoprazole  Injectable 40 milliGRAM(s) IV Push two times a day    MEDICATIONS  (PRN):  ALBUTerol    90 MICROgram(s) HFA Inhaler 2 Puff(s) Inhalation every 6 hours PRN Shortness of Breath and/or Wheezing  dextrose 40% Gel 15 Gram(s) Oral once PRN Blood Glucose LESS THAN 70 milliGRAM(s)/deciliter  glucagon  Injectable 1 milliGRAM(s) IntraMuscular once PRN Glucose LESS THAN 70 milligrams/deciliter  morphine  - Injectable 2 milliGRAM(s) IV Push every 6 hours PRN Moderate Pain (4 - 6)  ondansetron Injectable 4 milliGRAM(s) IV Push every 6 hours PRN Nausea and/or Vomiting      Allergies    amoxicillin (Hives)  Mushrooms (Hives)  penicillin (Unknown)  Prozac (Unknown)    Intolerances        Vital Signs Last 24 Hrs  T(C): 98.6 (10 Oct 2018 05:05), Max: 98.6 (10 Oct 2018 05:05)  T(F): 209.4 (10 Oct 2018 05:05), Max: 209.4 (10 Oct 2018 05:05)  HR: 99 (10 Oct 2018 05:05) (90 - 141)  BP: 114/80 (10 Oct 2018 05:05) (105/71 - 127/88)  BP(mean): --  RR: 16 (10 Oct 2018 05:05) (16 - 20)  SpO2: 99% (10 Oct 2018 05:05) (98% - 99%)        PHYSICAL EXAM:    General: Well developed; well nourished; in no acute distress  HEENT: MMM, conjunctiva and sclera clear  Lungs: Clear  Heart: Rhythm Regular, No Murmurs  Gastrointestinal: Soft with mild/ moderate upper abdominal tenderness with voluntary guarding but no rebound or distention. Normal bowel sounds; No Organomegaly & No Masses  Extremities: Normal range of motion, No clubbing, cyanosis or edema  Neurological: Alert and oriented x3, Non-focal  Skin: Warm and dry. No obvious rash  Rectal: No Masses, loose green stool      LABS:                        12.9   6.0   )-----------( 614      ( 09 Oct 2018 12:15 )             40.5     10-09    140  |  105  |  20.0  ----------------------------<  155<H>  3.6   |  17.0<L>  |  1.31<H>    Ca    9.5      09 Oct 2018 18:05  Mg     2.2     10-09    TPro  8.7  /  Alb  4.7  /  TBili  0.3<L>  /  DBili  x   /  AST  40<H>  /  ALT  29  /  AlkPhos  99  10-09          RADIOLOGY & ADDITIONAL STUDIES:     < from: CT Abdomen and Pelvis No Cont (10.09.18 @ 17:26) >   EXAM:  CT ABDOMEN AND PELVIS                          PROCEDURE DATE:  10/09/2018          INTERPRETATION:  HISTORY:    Left-sided abdominal pain.    DATE and TIME of EXAM: 10/9/2018 5:09 PM    TECHNIQUE:  Sections were obtained from the diaphragmto the pubic   symphysis without oral or IV contrast.     COMPARISON EXAMINATION:   8/10/2018.    FINDINGS:    The lung bases are clear and there is no evidence of a pleural effusion.    There is marked hepatomegaly. There is a solid lesion in the right lobe   of the liver measuring 2.5 cm. This was present on 8/10/2018 as well   however at a different appearance on the prior study performed with IV   contrast. No other focal intrahepatic lesions are noted. The liver   appears larger since 8/10/2018.    The spleen is not enlarged and demonstrates no focal abnormality. The   pancreatic contour is unremarkable without evidence of mass, inflammation   or ductal dilatation.    The gallbladder is unremarkable.    The adrenal glands demonstrate normal size and contour.    The kidneys reveal a normal unenhanced morphology without evidence of   stone or hydronephrosis.    No evidence of retroperitoneal or pelvic lymphadenopathy.    The bladder is unremarkable. No evidence of pelvic mass.    No intestinal abnormality is visualized.    No significant osseous abnormality.    IMPRESSION:    Marked hepatomegaly. The liver appears larger since 8/10/2018.    Solid lesion in the right lobe of the liver. Allowing for differences in   technique, this is probably unchanged since 8/10/2018..                LEXIE LUNA M.D., ATTENDING RADIOLOGIST  This document has been electronically signed. Oct  9 2018  5:59PM                  < end of copied text >

## 2018-10-10 NOTE — H&P ADULT - HISTORY OF PRESENT ILLNESS
18 year old patient with PMH of DM-1 and intermittent asthma came to the ED with the complaint of abdominal pain x 1 week. Pain is sharp, located in the epigastric region radiating to the back. Pain is exacerbated with food, alleviated with Motrin. She reported associated nausea and vomiting of 2 days. Vomitus is non-blood, non-billous. She has no fever, chills, chest pain, shortness of breath, palpitation, no diarrhea, dysuria, hematuria, no sick contact, no recent travel or change in diet. She reported similar episode in the past.

## 2018-10-10 NOTE — CONSULT NOTE PEDS - PROBLEM SELECTOR RECOMMENDATION 2
possible AVM versus FNH or adenoma. Doubt malignant neoplasm. Will hold on sonogram and cat scan with contrast and will get MRI with contrast as well as alph feto protein.

## 2018-10-10 NOTE — PROGRESS NOTE PEDS - SUBJECTIVE AND OBJECTIVE BOX
SOFIE STATON    [id number]    18y      Female    CC: Acute pancreatitis    INTERVAL HPI/OVERNIGHT EVENTS:  18 year old patient with PMH of DM-1 and intermittent asthma came to the ED with the complaint of abdominal pain x 1 week. Pain is sharp, located in the epigastric region radiating to the back. Pain is exacerbated with food, alleviated with Motrin. She reported associated nausea and vomiting of 2 days. Vomitus is non-blood, non-billous. She has no fever, chills, chest pain, shortness of breath, palpitation, no diarrhea, dysuria, hematuria, no sick contact, no recent travel or change in diet. She reported similar episode in the past. Pt admitted for acute pancreatitis     pt stated her abd pain this morning is slightly better. no n/v at this time no fevers, no sob    REVIEW OF SYSTEMS:    CONSTITUTIONAL: No fever, weight loss, or fatigue  RESPIRATORY: No cough, wheezing, hemoptysis; No shortness of breath  CARDIOVASCULAR: No chest pain, palpitations  GASTROINTESTINAL: min abdominal or epigastric pain. No nausea, vomiting        Vital Signs Last 24 Hrs  T(C): 37 (10 Oct 2018 12:12), Max: 98.6 (10 Oct 2018 05:05)  T(F): 98.6 (10 Oct 2018 12:12), Max: 209.4 (10 Oct 2018 05:05)  HR: 100 (10 Oct 2018 12:12) (90 - 104)  BP: 108/70 (10 Oct 2018 12:12) (103/68 - 127/88)  BP(mean): --  RR: 20 (10 Oct 2018 12:12) (16 - 20)  SpO2: 99% (10 Oct 2018 05:05) (98% - 99%)    PHYSICAL EXAM:    GENERAL: NAD, well-groomed  HEENT: PERRL, +EOMI  CHEST/LUNG: Clear to auscultation bilaterally; No wheezing, no crackles  HEART: S1S2+, Regular rate and rhythm; No murmurs  ABDOMEN: Soft, Nontender, Nondistended; Bowel sounds present  EXTREMITIES:  2+ Peripheral Pulses, No clubbing, cyanosis, or edema      LABS:                        9.5    4.0   )-----------( 413      ( 10 Oct 2018 08:21 )             28.7     10-10    140  |  106  |  16.0  ----------------------------<  126<H>  2.4<LL>   |  17.0<L>  |  1.05    Ca    8.8      10 Oct 2018 08:21  Mg     2.2     10-09    TPro  6.2<L>  /  Alb  3.6  /  TBili  0.3<L>  /  DBili  x   /  AST  79<H>  /  ALT  27  /  AlkPhos  69  10-10    PT/INR - ( 09 Oct 2018 12:15 )   PT: 11.3 sec;   INR: 1.03 ratio         PTT - ( 09 Oct 2018 12:15 )  PTT:27.0 sec      MEDICATIONS  (STANDING):  dextrose 5%. 1000 milliLiter(s) (50 mL/Hr) IV Continuous <Continuous>  dextrose 50% Injectable 12.5 Gram(s) IV Push once  dextrose 50% Injectable 25 Gram(s) IV Push once  dextrose 50% Injectable 25 Gram(s) IV Push once  influenza   Vaccine 0.5 milliLiter(s) IntraMuscular once  insulin glargine Injectable (LANTUS) 12 Unit(s) SubCutaneous at bedtime  insulin lispro (HumaLOG) corrective regimen sliding scale   SubCutaneous three times a day before meals  lactated ringers. 1000 milliLiter(s) (100 mL/Hr) IV Continuous <Continuous>  pantoprazole  Injectable 40 milliGRAM(s) IV Push two times a day  potassium chloride  20 mEq/100 mL IVPB 20 milliEquivalent(s) IV Intermittent every 2 hours    MEDICATIONS  (PRN):  ALBUTerol    90 MICROgram(s) HFA Inhaler 2 Puff(s) Inhalation every 6 hours PRN Shortness of Breath and/or Wheezing  dextrose 40% Gel 15 Gram(s) Oral once PRN Blood Glucose LESS THAN 70 milliGRAM(s)/deciliter  glucagon  Injectable 1 milliGRAM(s) IntraMuscular once PRN Glucose LESS THAN 70 milligrams/deciliter  morphine  - Injectable 2 milliGRAM(s) IV Push every 6 hours PRN Moderate Pain (4 - 6)  ondansetron Injectable 4 milliGRAM(s) IV Push every 6 hours PRN Nausea and/or Vomiting

## 2018-10-11 ENCOUNTER — TRANSCRIPTION ENCOUNTER (OUTPATIENT)
Age: 18
End: 2018-10-11

## 2018-10-11 LAB
ALBUMIN SERPL ELPH-MCNC: 3.2 G/DL — LOW (ref 3.3–5.2)
ALP SERPL-CCNC: 66 U/L — SIGNIFICANT CHANGE UP (ref 40–120)
ALT FLD-CCNC: 20 U/L — SIGNIFICANT CHANGE UP
ANION GAP SERPL CALC-SCNC: 13 MMOL/L — SIGNIFICANT CHANGE UP (ref 5–17)
ANION GAP SERPL CALC-SCNC: 15 MMOL/L — SIGNIFICANT CHANGE UP (ref 5–17)
AST SERPL-CCNC: 37 U/L — HIGH
BILIRUB SERPL-MCNC: 0.3 MG/DL — LOW (ref 0.4–2)
BUN SERPL-MCNC: 7 MG/DL — LOW (ref 8–20)
BUN SERPL-MCNC: 8 MG/DL — SIGNIFICANT CHANGE UP (ref 8–20)
CALCIUM SERPL-MCNC: 8.5 MG/DL — LOW (ref 8.6–10.2)
CALCIUM SERPL-MCNC: 9 MG/DL — SIGNIFICANT CHANGE UP (ref 8.6–10.2)
CHLORIDE SERPL-SCNC: 105 MMOL/L — SIGNIFICANT CHANGE UP (ref 98–107)
CHLORIDE SERPL-SCNC: 110 MMOL/L — HIGH (ref 98–107)
CO2 SERPL-SCNC: 17 MMOL/L — LOW (ref 22–29)
CO2 SERPL-SCNC: 19 MMOL/L — LOW (ref 22–29)
CREAT SERPL-MCNC: 0.71 MG/DL — SIGNIFICANT CHANGE UP (ref 0.5–1.3)
CREAT SERPL-MCNC: 0.74 MG/DL — SIGNIFICANT CHANGE UP (ref 0.5–1.3)
FERRITIN SERPL-MCNC: 37 NG/ML — SIGNIFICANT CHANGE UP (ref 15–150)
GLUCOSE BLDC GLUCOMTR-MCNC: 105 MG/DL — HIGH (ref 70–99)
GLUCOSE BLDC GLUCOMTR-MCNC: 137 MG/DL — HIGH (ref 70–99)
GLUCOSE BLDC GLUCOMTR-MCNC: 249 MG/DL — HIGH (ref 70–99)
GLUCOSE BLDC GLUCOMTR-MCNC: 286 MG/DL — HIGH (ref 70–99)
GLUCOSE BLDC GLUCOMTR-MCNC: 83 MG/DL — SIGNIFICANT CHANGE UP (ref 70–99)
GLUCOSE BLDC GLUCOMTR-MCNC: 98 MG/DL — SIGNIFICANT CHANGE UP (ref 70–99)
GLUCOSE SERPL-MCNC: 239 MG/DL — HIGH (ref 70–115)
GLUCOSE SERPL-MCNC: 76 MG/DL — SIGNIFICANT CHANGE UP (ref 70–115)
HCT VFR BLD CALC: 28 % — LOW (ref 37–47)
HGB BLD-MCNC: 9.2 G/DL — LOW (ref 12–16)
IRON SATN MFR SERPL: 20 % — SIGNIFICANT CHANGE UP (ref 14–50)
IRON SATN MFR SERPL: 58 UG/DL — SIGNIFICANT CHANGE UP (ref 37–145)
LIDOCAIN IGE QN: 249 U/L — HIGH (ref 22–51)
MAGNESIUM SERPL-MCNC: 1.7 MG/DL — LOW (ref 1.8–2.6)
MCHC RBC-ENTMCNC: 27.6 PG — SIGNIFICANT CHANGE UP (ref 27–31)
MCHC RBC-ENTMCNC: 32.9 G/DL — SIGNIFICANT CHANGE UP (ref 32–36)
MCV RBC AUTO: 84.1 FL — SIGNIFICANT CHANGE UP (ref 81–99)
PLATELET # BLD AUTO: 375 K/UL — SIGNIFICANT CHANGE UP (ref 150–400)
POTASSIUM SERPL-MCNC: 2.8 MMOL/L — CRITICAL LOW (ref 3.5–5.3)
POTASSIUM SERPL-MCNC: 3.8 MMOL/L — SIGNIFICANT CHANGE UP (ref 3.5–5.3)
POTASSIUM SERPL-SCNC: 2.8 MMOL/L — CRITICAL LOW (ref 3.5–5.3)
POTASSIUM SERPL-SCNC: 3.8 MMOL/L — SIGNIFICANT CHANGE UP (ref 3.5–5.3)
PROT SERPL-MCNC: 5.8 G/DL — LOW (ref 6.6–8.7)
RBC # BLD: 3.33 M/UL — LOW (ref 4.4–5.2)
RBC # FLD: 15.1 % — SIGNIFICANT CHANGE UP (ref 11–15.6)
SODIUM SERPL-SCNC: 137 MMOL/L — SIGNIFICANT CHANGE UP (ref 135–145)
SODIUM SERPL-SCNC: 142 MMOL/L — SIGNIFICANT CHANGE UP (ref 135–145)
TIBC SERPL-MCNC: 295 UG/DL — SIGNIFICANT CHANGE UP (ref 220–430)
TRANSFERRIN SERPL-MCNC: 206 MG/DL — SIGNIFICANT CHANGE UP (ref 192–382)
WBC # BLD: 3.8 K/UL — LOW (ref 4.8–10.8)
WBC # FLD AUTO: 3.8 K/UL — LOW (ref 4.8–10.8)

## 2018-10-11 PROCEDURE — 99233 SBSQ HOSP IP/OBS HIGH 50: CPT

## 2018-10-11 PROCEDURE — 99232 SBSQ HOSP IP/OBS MODERATE 35: CPT

## 2018-10-11 PROCEDURE — 74182 MRI ABDOMEN W/CONTRAST: CPT | Mod: 26

## 2018-10-11 RX ORDER — MAGNESIUM SULFATE 500 MG/ML
2 VIAL (ML) INJECTION ONCE
Qty: 0 | Refills: 0 | Status: COMPLETED | OUTPATIENT
Start: 2018-10-11 | End: 2018-10-11

## 2018-10-11 RX ORDER — POTASSIUM CHLORIDE 20 MEQ
10 PACKET (EA) ORAL
Qty: 0 | Refills: 0 | Status: COMPLETED | OUTPATIENT
Start: 2018-10-11 | End: 2018-10-11

## 2018-10-11 RX ORDER — POTASSIUM CHLORIDE 20 MEQ
40 PACKET (EA) ORAL EVERY 4 HOURS
Qty: 0 | Refills: 0 | Status: COMPLETED | OUTPATIENT
Start: 2018-10-11 | End: 2018-10-11

## 2018-10-11 RX ORDER — POTASSIUM CHLORIDE 20 MEQ
10 PACKET (EA) ORAL ONCE
Qty: 0 | Refills: 0 | Status: COMPLETED | OUTPATIENT
Start: 2018-10-11 | End: 2018-10-11

## 2018-10-11 RX ORDER — POTASSIUM CHLORIDE 20 MEQ
40 PACKET (EA) ORAL ONCE
Qty: 0 | Refills: 0 | Status: DISCONTINUED | OUTPATIENT
Start: 2018-10-11 | End: 2018-10-11

## 2018-10-11 RX ADMIN — Medication 40 MILLIEQUIVALENT(S): at 09:04

## 2018-10-11 RX ADMIN — MORPHINE SULFATE 2 MILLIGRAM(S): 50 CAPSULE, EXTENDED RELEASE ORAL at 04:08

## 2018-10-11 RX ADMIN — MORPHINE SULFATE 2 MILLIGRAM(S): 50 CAPSULE, EXTENDED RELEASE ORAL at 02:44

## 2018-10-11 RX ADMIN — PANTOPRAZOLE SODIUM 40 MILLIGRAM(S): 20 TABLET, DELAYED RELEASE ORAL at 09:05

## 2018-10-11 RX ADMIN — Medication 40 MILLIEQUIVALENT(S): at 16:31

## 2018-10-11 RX ADMIN — INSULIN GLARGINE 12 UNIT(S): 100 INJECTION, SOLUTION SUBCUTANEOUS at 20:24

## 2018-10-11 RX ADMIN — Medication 100 MILLIEQUIVALENT(S): at 10:11

## 2018-10-11 RX ADMIN — Medication 50 GRAM(S): at 16:31

## 2018-10-11 RX ADMIN — Medication 100 MILLIEQUIVALENT(S): at 09:05

## 2018-10-11 RX ADMIN — PANTOPRAZOLE SODIUM 40 MILLIGRAM(S): 20 TABLET, DELAYED RELEASE ORAL at 18:28

## 2018-10-11 RX ADMIN — Medication 100 MILLIEQUIVALENT(S): at 13:22

## 2018-10-11 RX ADMIN — Medication 3: at 17:22

## 2018-10-11 NOTE — PROGRESS NOTE ADULT - SUBJECTIVE AND OBJECTIVE BOX
INTERVAL HPI/OVERNIGHT EVENTS:  follow up of diabetes    MEDICATIONS  (STANDING):  dextrose 5% + sodium chloride 0.9%. 1000 milliLiter(s) (75 mL/Hr) IV Continuous <Continuous>  dextrose 5%. 1000 milliLiter(s) (50 mL/Hr) IV Continuous <Continuous>  dextrose 50% Injectable 12.5 Gram(s) IV Push once  dextrose 50% Injectable 25 Gram(s) IV Push once  dextrose 50% Injectable 25 Gram(s) IV Push once  influenza   Vaccine 0.5 milliLiter(s) IntraMuscular once  insulin glargine Injectable (LANTUS) 12 Unit(s) SubCutaneous at bedtime  insulin lispro (HumaLOG) corrective regimen sliding scale   SubCutaneous three times a day before meals  magnesium sulfate  IVPB 2 Gram(s) IV Intermittent once  pantoprazole  Injectable 40 milliGRAM(s) IV Push two times a day  potassium chloride    Tablet ER 40 milliEquivalent(s) Oral every 4 hours  potassium chloride  10 mEq/100 mL IVPB 10 milliEquivalent(s) IV Intermittent once    MEDICATIONS  (PRN):  ALBUTerol    90 MICROgram(s) HFA Inhaler 2 Puff(s) Inhalation every 6 hours PRN Shortness of Breath and/or Wheezing  dextrose 40% Gel 15 Gram(s) Oral once PRN Blood Glucose LESS THAN 70 milliGRAM(s)/deciliter  glucagon  Injectable 1 milliGRAM(s) IntraMuscular once PRN Glucose LESS THAN 70 milligrams/deciliter  morphine  - Injectable 2 milliGRAM(s) IV Push every 6 hours PRN Moderate Pain (4 - 6)  ondansetron Injectable 4 milliGRAM(s) IV Push every 6 hours PRN Nausea and/or Vomiting      Allergies    amoxicillin (Hives)  Mushrooms (Hives)  penicillin (Unknown)  Prozac (Unknown)          Review of systems: less abdominal pain. Appetite poor.    Vital Signs Last 24 Hrs  T(C): 36.8 (11 Oct 2018 09:56), Max: 37.1 (11 Oct 2018 05:11)  T(F): 98.3 (11 Oct 2018 09:56), Max: 98.7 (11 Oct 2018 05:11)  HR: 92 (11 Oct 2018 09:56) (91 - 96)  BP: 125/92 (11 Oct 2018 09:56) (109/70 - 125/92)  BP(mean): --  RR: 19 (11 Oct 2018 09:56) (18 - 19)  SpO2: 98% (10 Oct 2018 21:35) (98% - 98%)      LABS:                        9.2    3.8   )-----------( 375      ( 11 Oct 2018 07:17 )             28.0     10-11    137  |  105  |  7.0<L>  ----------------------------<  239<H>  3.8   |  17.0<L>  |  0.74    Ca    8.5<L>      11 Oct 2018 15:24  Mg     1.7     10-11    TPro  5.8<L>  /  Alb  3.2<L>  /  TBili  0.3<L>  /  DBili  x   /  AST  37<H>  /  ALT  20  /  AlkPhos  66  10-11          POCT Blood Glucose.: 137 mg/dL (10-11-18 @ 12:52)  POCT Blood Glucose.: 98 mg/dL (10-11-18 @ 08:20)  POCT Blood Glucose.: 83 mg/dL (10-11-18 @ 04:23)  POCT Blood Glucose.: 105 mg/dL (10-11-18 @ 00:02)  POCT Blood Glucose.: 100 mg/dL (10-10-18 @ 20:12)  POCT Blood Glucose.: 109 mg/dL (10-10-18 @ 16:12)  POCT Blood Glucose.: 195 mg/dL (10-10-18 @ 12:33)  POCT Blood Glucose.: 47 mg/dL (10-10-18 @ 12:04)  POCT Blood Glucose.: 43 mg/dL (10-10-18 @ 12:00)  POCT Blood Glucose.: 202 mg/dL (10-10-18 @ 07:56)  POCT Blood Glucose.: 43 mg/dL (10-10-18 @ 07:35)  POCT Blood Glucose.: 173 mg/dL (10-10-18 @ 01:37)  POCT Blood Glucose.: 424 mg/dL (10-10-18 @ 00:21)  POCT Blood Glucose.: 457 mg/dL (10-09-18 @ 21:13)  POCT Blood Glucose.: 70 mg/dL (10-09-18 @ 11:33)

## 2018-10-11 NOTE — PROGRESS NOTE ADULT - ASSESSMENT
18 year old patient with PMH of DM-1 and intermittent asthma came to the ED with the complaint of abdominal pain x 1 week. Pain is sharp, located in the epigastric region radiating to the back. Pain is exacerbated with food, alleviated with Motrin. She reported associated nausea and vomiting of 2 days. Vomitus is non-blood, non-billous. She has no fever, chills, chest pain, shortness of breath, palpitation, no diarrhea, dysuria, hematuria, no sick contact, no recent travel or change in diet. She reported similar episode in the past.       1) Epigastric pain likely due to acute pancreatitis   - Lipase 548 now trending down  - Will continue pain medication with morphine PRN   - Zofran PRN for nausea/vomit  - IVF  - started on clear liquid diet per GI  - per GI will rule PUD, EGD pending    2) Hepatomegaly/hepatic lesion  - CT abdomen showed: marked hepatomegaly. Solid lesion in the right lobe of the liver  - GI recs appreciated  - MRI pending    3) Hypokalemia and Hypomagnesemia  - replace and monitor level  - recheck levels today    4) CHALINO likely due to dehydration: resolved    5) DM-1  - Continue Lantus at half home dose 12units HS  - endocrinology recs appreciated  - Will hold Novolog 5units TID for now   - Insulin sliding scale     6) Asthma: stable   - Albuterol PRN     7) Anemia   - seems to be at baseline  - likely due to menstruation    8) Prophylactic Measure:  - DVT prophylaxis: ambulate

## 2018-10-11 NOTE — PROGRESS NOTE ADULT - SUBJECTIVE AND OBJECTIVE BOX
Pt seen and examined f/u epigastric pain, prior nausea and vomiting and hepatic defect on imaging.    This moprning the epigastric pain is less and only mild. No nausea or vomiting. Potassium is 2.8 so EGD cannot be done. MRI with contrast pending.    REVIEW OF SYSTEMS:    CONSTITUTIONAL: No fever, weight loss, or fatigue  EYES: No eye pain, visual disturbances, or discharge  ENMT:  No difficulty hearing, tinnitus, vertigo; No sinus or throat pain  RESPIRATORY: No cough, wheezing, chills or hemoptysis; No shortness of breath  CARDIOVASCULAR: No chest pain, palpitations, dizziness, or leg swelling  GASTROINTESTINAL:as above    MEDICATIONS:  MEDICATIONS  (STANDING):  dextrose 5% + sodium chloride 0.9%. 1000 milliLiter(s) (75 mL/Hr) IV Continuous <Continuous>  dextrose 5%. 1000 milliLiter(s) (50 mL/Hr) IV Continuous <Continuous>  dextrose 50% Injectable 12.5 Gram(s) IV Push once  dextrose 50% Injectable 25 Gram(s) IV Push once  dextrose 50% Injectable 25 Gram(s) IV Push once  influenza   Vaccine 0.5 milliLiter(s) IntraMuscular once  insulin glargine Injectable (LANTUS) 12 Unit(s) SubCutaneous at bedtime  insulin lispro (HumaLOG) corrective regimen sliding scale   SubCutaneous three times a day before meals  pantoprazole  Injectable 40 milliGRAM(s) IV Push two times a day  potassium chloride    Tablet ER 40 milliEquivalent(s) Oral every 4 hours  potassium chloride  10 mEq/100 mL IVPB 10 milliEquivalent(s) IV Intermittent every 1 hour  potassium chloride  10 mEq/100 mL IVPB 10 milliEquivalent(s) IV Intermittent once    MEDICATIONS  (PRN):  ALBUTerol    90 MICROgram(s) HFA Inhaler 2 Puff(s) Inhalation every 6 hours PRN Shortness of Breath and/or Wheezing  dextrose 40% Gel 15 Gram(s) Oral once PRN Blood Glucose LESS THAN 70 milliGRAM(s)/deciliter  glucagon  Injectable 1 milliGRAM(s) IntraMuscular once PRN Glucose LESS THAN 70 milligrams/deciliter  morphine  - Injectable 2 milliGRAM(s) IV Push every 6 hours PRN Moderate Pain (4 - 6)  ondansetron Injectable 4 milliGRAM(s) IV Push every 6 hours PRN Nausea and/or Vomiting      Allergies    amoxicillin (Hives)  Mushrooms (Hives)  penicillin (Unknown)  Prozac (Unknown)    Intolerances        Vital Signs Last 24 Hrs  T(C): 37.1 (11 Oct 2018 05:11), Max: 37.1 (11 Oct 2018 05:11)  T(F): 98.7 (11 Oct 2018 05:11), Max: 98.7 (11 Oct 2018 05:11)  HR: 91 (11 Oct 2018 05:11) (91 - 110)  BP: 115/84 (11 Oct 2018 05:11) (106/72 - 115/84)  BP(mean): --  RR: 18 (11 Oct 2018 05:11) (18 - 20)  SpO2: 98% (10 Oct 2018 21:35) (98% - 99%)    10-10 @ 07:01  -  10-11 @ 07:00  --------------------------------------------------------  IN: 1140 mL / OUT: 0 mL / NET: 1140 mL        PHYSICAL EXAM:    General: Well developed; well nourished; in no acute distress  HEENT: MMM, conjunctiva and sclera clear  Gastrointestinal:Abdomen: Soft non-tender non-distended; Normal bowel sounds; No hepatosplenomegaly  Extremities: no cyanosis, clubbing or edema.  Skin: Warm and dry. No obvious rash    LABS:      CBC Full  -  ( 11 Oct 2018 07:17 )  WBC Count : 3.8 K/uL  Hemoglobin : 9.2 g/dL  Hematocrit : 28.0 %  Platelet Count - Automated : 375 K/uL  Mean Cell Volume : 84.1 fl  Mean Cell Hemoglobin : 27.6 pg  Mean Cell Hemoglobin Concentration : 32.9 g/dL  Auto Neutrophil # : x  Auto Lymphocyte # : x  Auto Monocyte # : x  Auto Eosinophil # : x  Auto Basophil # : x  Auto Neutrophil % : x  Auto Lymphocyte % : x  Auto Monocyte % : x  Auto Eosinophil % : x  Auto Basophil % : x    10-11    142  |  110<H>  |  8.0  ----------------------------<  76  2.8<LL>   |  19.0<L>  |  0.71    Ca    9.0      11 Oct 2018 07:17  Mg     1.7     10-11    TPro  5.8<L>  /  Alb  3.2<L>  /  TBili  0.3<L>  /  DBili  x   /  AST  37<H>  /  ALT  20  /  AlkPhos  66  10-11    PT/INR - ( 09 Oct 2018 12:15 )   PT: 11.3 sec;   INR: 1.03 ratio         PTT - ( 09 Oct 2018 12:15 )  PTT:27.0 sec

## 2018-10-11 NOTE — PROGRESS NOTE ADULT - SUBJECTIVE AND OBJECTIVE BOX
Patient is a 18y old  Female who presents with a chief complaint of Epigastric pain that started  on .  The pain was in the area of her chest but more toward the right.  The pain went   away but returned on Tuesday and got worse. A CT of the abdomen found marked   hepatomegaly with a lesion in the right side of the liver. She is scheduled to have an EGD.                      (10 Oct 2018 18:03)      PAST MEDICAL HISTORY:  Diabetes type one  Asthma      PAST SURGICAL HISTORY:   section @ age 16        MEDICATIONS  (STANDING):  dextrose 5% + sodium chloride 0.9%. 1000 milliLiter(s) (75 mL/Hr) IV Continuous <Continuous>  dextrose 5%. 1000 milliLiter(s) (50 mL/Hr) IV Continuous <Continuous>  dextrose 50% Injectable 12.5 Gram(s) IV Push once  dextrose 50% Injectable 25 Gram(s) IV Push once  dextrose 50% Injectable 25 Gram(s) IV Push once  influenza   Vaccine 0.5 milliLiter(s) IntraMuscular once  insulin glargine Injectable (LANTUS) 12 Unit(s) SubCutaneous at bedtime  insulin lispro (HumaLOG) corrective regimen sliding scale   SubCutaneous three times a day before meals  pantoprazole  Injectable 40 milliGRAM(s) IV Push two times a day  potassium chloride    Tablet ER 40 milliEquivalent(s) Oral once  potassium chloride  10 mEq/100 mL IVPB 10 milliEquivalent(s) IV Intermittent every 1 hour  potassium chloride  10 mEq/100 mL IVPB 10 milliEquivalent(s) IV Intermittent once    MEDICATIONS  (PRN):  ALBUTerol    90 MICROgram(s) HFA Inhaler 2 Puff(s) Inhalation every 6 hours PRN Shortness of Breath and/or Wheezing  dextrose 40% Gel 15 Gram(s) Oral once PRN Blood Glucose LESS THAN 70 milliGRAM(s)/deciliter  glucagon  Injectable 1 milliGRAM(s) IntraMuscular once PRN Glucose LESS THAN 70 milligrams/deciliter  morphine  - Injectable 2 milliGRAM(s) IV Push every 6 hours PRN Moderate Pain (4 - 6)  ondansetron Injectable 4 milliGRAM(s) IV Push every 6 hours PRN Nausea and/or Vomiting      Allergies:     amoxicillin (Hives)                    Mushrooms (Hives)                    penicillin (Unknown)                    Prozac (Unknown)    SOCIAL HISTORY:  The patient does not drink alcohol, smoke or use illicit drugs.                 9.2    3.8   )-----------( 375      ( 11 Oct 2018 07:17 )             28.0       PT/INR - ( 09 Oct 2018 12:15 )   PT: 11.3 sec;   INR: 1.03 ratio         PTT - ( 09 Oct 2018 12:15 )  PTT:27.0 sec    1011    142  |  110<H>  |  8.0  ----------------------------<  76  2.8<LL>   |  19.0<L>  |  0.71    Ca    9.0      11 Oct 2018 07:17  Mg     1.7     10    TPro  5.8<L>  /  Alb  3.2<L>  /  TBili  0.3<L>  /  DBili  x   /  AST  37<H>  /  ALT  20  /  AlkPhos  66  10-    EKG:  -  10/9/2018  Sinus tachycardia  Nonspecific ST and Twave abnormality  Abnormal ECG    ASA # = 2  Mallampati # = 1 Patient is a 18y old  Female who presents with a chief complaint of Epigastric pain that started  on .  The pain was in the area of her chest but more toward the right.  The pain went   away but returned on Tuesday and got worse. A CT of the abdomen found marked   hepatomegaly with a lesion in the right side of the liver. She is scheduled to have an EGD.                                         (10 Oct 2018 18:03)      PAST MEDICAL HISTORY:  Diabetes type one  Asthma      PAST SURGICAL HISTORY:   section @ age 16        MEDICATIONS  (STANDING):  dextrose 5% + sodium chloride 0.9%. 1000 milliLiter(s) (75 mL/Hr) IV Continuous <Continuous>  dextrose 5%. 1000 milliLiter(s) (50 mL/Hr) IV Continuous <Continuous>  dextrose 50% Injectable 12.5 Gram(s) IV Push once  dextrose 50% Injectable 25 Gram(s) IV Push once  dextrose 50% Injectable 25 Gram(s) IV Push once  influenza   Vaccine 0.5 milliLiter(s) IntraMuscular once  insulin glargine Injectable (LANTUS) 12 Unit(s) SubCutaneous at bedtime  insulin lispro (HumaLOG) corrective regimen sliding scale   SubCutaneous three times a day before meals  pantoprazole  Injectable 40 milliGRAM(s) IV Push two times a day  potassium chloride    Tablet ER 40 milliEquivalent(s) Oral once  potassium chloride  10 mEq/100 mL IVPB 10 milliEquivalent(s) IV Intermittent every 1 hour  potassium chloride  10 mEq/100 mL IVPB 10 milliEquivalent(s) IV Intermittent once    MEDICATIONS  (PRN):  ALBUTerol    90 MICROgram(s) HFA Inhaler 2 Puff(s) Inhalation every 6 hours PRN Shortness of Breath and/or Wheezing  dextrose 40% Gel 15 Gram(s) Oral once PRN Blood Glucose LESS THAN 70 milliGRAM(s)/deciliter  glucagon  Injectable 1 milliGRAM(s) IntraMuscular once PRN Glucose LESS THAN 70 milligrams/deciliter  morphine  - Injectable 2 milliGRAM(s) IV Push every 6 hours PRN Moderate Pain (4 - 6)  ondansetron Injectable 4 milliGRAM(s) IV Push every 6 hours PRN Nausea and/or Vomiting      Allergies:     amoxicillin (Hives)                    Mushrooms (Hives)                    penicillin (Unknown)                    Prozac (Unknown)    SOCIAL HISTORY:  The patient does not drink alcohol, smoke or use illicit drugs.                 9.2    3.8   )-----------( 375      ( 11 Oct 2018 07:17 )             28.0       PT/INR - ( 09 Oct 2018 12:15 )   PT: 11.3 sec;   INR: 1.03 ratio         PTT - ( 09 Oct 2018 12:15 )  PTT:27.0 sec    1011    142  |  110<H>  |  8.0  ----------------------------<  76  2.8<LL>   |  19.0<L>  |  0.71    Ca    9.0      11 Oct 2018 07:17  Mg     1.7     10    TPro  5.8<L>  /  Alb  3.2<L>  /  TBili  0.3<L>  /  DBili  x   /  AST  37<H>  /  ALT  20  /  AlkPhos  66  10-    EKG:  -  10/9/2018  Sinus tachycardia  Nonspecific ST and Twave abnormality  Abnormal ECG    ASA # = 2  Mallampati # = 1

## 2018-10-11 NOTE — PROGRESS NOTE ADULT - SUBJECTIVE AND OBJECTIVE BOX
SOFIE STATON    [id number]    18y      Female    CC: Acute pancreatitis/ pancreatic cyst    INTERVAL HPI/OVERNIGHT EVENTS:  18 year old patient with PMH of DM-1 and intermittent asthma came to the ED with the complaint of abdominal pain x 1 week. Pain is sharp, located in the epigastric region radiating to the back. Pain is exacerbated with food, alleviated with Motrin. She reported associated nausea and vomiting of 2 days. Vomitus is non-blood, non-billous. She has no fever, chills, chest pain, shortness of breath, palpitation, no diarrhea, dysuria, hematuria, no sick contact, no recent travel or change in diet. She reported similar episode in the past. Pt admitted for acute pancreatitis     pt stated her abd pain this morning is slightly better. no n/v at this time no fevers, no sob    REVIEW OF SYSTEMS:    CONSTITUTIONAL: No fever, weight loss, or fatigue  RESPIRATORY: No cough, wheezing, hemoptysis; No shortness of breath  CARDIOVASCULAR: No chest pain, palpitations  GASTROINTESTINAL: min abdominal or epigastric pain. No nausea, vomiting        Vital Signs Last 24 Hrs  T(C): 36.8 (11 Oct 2018 09:56), Max: 37.1 (11 Oct 2018 05:11)  T(F): 98.3 (11 Oct 2018 09:56), Max: 98.7 (11 Oct 2018 05:11)  HR: 92 (11 Oct 2018 09:56) (91 - 110)  BP: 125/92 (11 Oct 2018 09:56) (106/72 - 125/92)  BP(mean): --  RR: 19 (11 Oct 2018 09:56) (18 - 19)  SpO2: 98% (10 Oct 2018 21:35) (98% - 99%)    PHYSICAL EXAM:    GENERAL: NAD, well-groomed  HEENT: PERRL, +EOMI  CHEST/LUNG: Clear to auscultation bilaterally; No wheezing, no crackles  HEART: S1S2+, Regular rate and rhythm; No murmurs  ABDOMEN: Soft, Nontender, Nondistended; Bowel sounds present  EXTREMITIES:  2+ Peripheral Pulses, No clubbing, cyanosis, or edema      LABS:                        9.2    3.8   )-----------( 375      ( 11 Oct 2018 07:17 )             28.0     10-11    142  |  110<H>  |  8.0  ----------------------------<  76  2.8<LL>   |  19.0<L>  |  0.71    Ca    9.0      11 Oct 2018 07:17  Mg     1.7     10-11    TPro  5.8<L>  /  Alb  3.2<L>  /  TBili  0.3<L>  /  DBili  x   /  AST  37<H>  /  ALT  20  /  AlkPhos  66  10-11        MEDICATIONS  (STANDING):  dextrose 5% + sodium chloride 0.9%. 1000 milliLiter(s) (75 mL/Hr) IV Continuous <Continuous>  dextrose 5%. 1000 milliLiter(s) (50 mL/Hr) IV Continuous <Continuous>  dextrose 50% Injectable 12.5 Gram(s) IV Push once  dextrose 50% Injectable 25 Gram(s) IV Push once  dextrose 50% Injectable 25 Gram(s) IV Push once  influenza   Vaccine 0.5 milliLiter(s) IntraMuscular once  insulin glargine Injectable (LANTUS) 12 Unit(s) SubCutaneous at bedtime  insulin lispro (HumaLOG) corrective regimen sliding scale   SubCutaneous three times a day before meals  magnesium sulfate  IVPB 2 Gram(s) IV Intermittent once  pantoprazole  Injectable 40 milliGRAM(s) IV Push two times a day  potassium chloride    Tablet ER 40 milliEquivalent(s) Oral every 4 hours  potassium chloride  10 mEq/100 mL IVPB 10 milliEquivalent(s) IV Intermittent every 1 hour  potassium chloride  10 mEq/100 mL IVPB 10 milliEquivalent(s) IV Intermittent once    MEDICATIONS  (PRN):  ALBUTerol    90 MICROgram(s) HFA Inhaler 2 Puff(s) Inhalation every 6 hours PRN Shortness of Breath and/or Wheezing  dextrose 40% Gel 15 Gram(s) Oral once PRN Blood Glucose LESS THAN 70 milliGRAM(s)/deciliter  glucagon  Injectable 1 milliGRAM(s) IntraMuscular once PRN Glucose LESS THAN 70 milligrams/deciliter  morphine  - Injectable 2 milliGRAM(s) IV Push every 6 hours PRN Moderate Pain (4 - 6)  ondansetron Injectable 4 milliGRAM(s) IV Push every 6 hours PRN Nausea and/or Vomiting

## 2018-10-11 NOTE — PROGRESS NOTE ADULT - ASSESSMENT
good control of diabetes on 12 units of basal insulin, which matches likely insulin requirement for type 1 diabetes and slim body habitus. Pt. will require small amounts of prandial insulin when she begins to eat. Outpatient follow up will be important as encouraging adherence to this regimen is the chisholm factor in improving short and long term prognosis for this patient.  Gyn follow up also important, as pregnancy should be avoided at this time due to uncontrolled diabetes.

## 2018-10-11 NOTE — PROGRESS NOTE ADULT - PROBLEM SELECTOR PLAN 1
EGD deferred due to low potassium and will treat with potasium 40meq X 2 today and reaschedule for tomorrow AM. Will start clear liquids.

## 2018-10-12 ENCOUNTER — RESULT REVIEW (OUTPATIENT)
Age: 18
End: 2018-10-12

## 2018-10-12 DIAGNOSIS — Z71.89 OTHER SPECIFIED COUNSELING: ICD-10-CM

## 2018-10-12 LAB
ANION GAP SERPL CALC-SCNC: 15 MMOL/L — SIGNIFICANT CHANGE UP (ref 5–17)
BUN SERPL-MCNC: 7 MG/DL — LOW (ref 8–20)
CALCIUM SERPL-MCNC: 8.8 MG/DL — SIGNIFICANT CHANGE UP (ref 8.6–10.2)
CHLORIDE SERPL-SCNC: 106 MMOL/L — SIGNIFICANT CHANGE UP (ref 98–107)
CO2 SERPL-SCNC: 18 MMOL/L — LOW (ref 22–29)
CREAT SERPL-MCNC: 0.65 MG/DL — SIGNIFICANT CHANGE UP (ref 0.5–1.3)
GLUCOSE BLDC GLUCOMTR-MCNC: 110 MG/DL — HIGH (ref 70–99)
GLUCOSE BLDC GLUCOMTR-MCNC: 111 MG/DL — HIGH (ref 70–99)
GLUCOSE BLDC GLUCOMTR-MCNC: 287 MG/DL — HIGH (ref 70–99)
GLUCOSE BLDC GLUCOMTR-MCNC: 306 MG/DL — HIGH (ref 70–99)
GLUCOSE BLDC GLUCOMTR-MCNC: 316 MG/DL — HIGH (ref 70–99)
GLUCOSE BLDC GLUCOMTR-MCNC: 434 MG/DL — HIGH (ref 70–99)
GLUCOSE SERPL-MCNC: 124 MG/DL — HIGH (ref 70–115)
HCT VFR BLD CALC: 27.7 % — LOW (ref 37–47)
HGB BLD-MCNC: 8.9 G/DL — LOW (ref 12–16)
MAGNESIUM SERPL-MCNC: 1.8 MG/DL — SIGNIFICANT CHANGE UP (ref 1.6–2.6)
MCHC RBC-ENTMCNC: 27.5 PG — SIGNIFICANT CHANGE UP (ref 27–31)
MCHC RBC-ENTMCNC: 32.1 G/DL — SIGNIFICANT CHANGE UP (ref 32–36)
MCV RBC AUTO: 85.5 FL — SIGNIFICANT CHANGE UP (ref 81–99)
PLATELET # BLD AUTO: 339 K/UL — SIGNIFICANT CHANGE UP (ref 150–400)
POTASSIUM SERPL-MCNC: 3.2 MMOL/L — LOW (ref 3.5–5.3)
POTASSIUM SERPL-SCNC: 3.2 MMOL/L — LOW (ref 3.5–5.3)
RBC # BLD: 3.24 M/UL — LOW (ref 4.4–5.2)
RBC # FLD: 15.6 % — SIGNIFICANT CHANGE UP (ref 11–15.6)
SODIUM SERPL-SCNC: 139 MMOL/L — SIGNIFICANT CHANGE UP (ref 135–145)
VIT B12 SERPL-MCNC: 310 PG/ML — SIGNIFICANT CHANGE UP (ref 232–1245)
WBC # BLD: 3 K/UL — LOW (ref 4.8–10.8)
WBC # FLD AUTO: 3 K/UL — LOW (ref 4.8–10.8)

## 2018-10-12 PROCEDURE — 88305 TISSUE EXAM BY PATHOLOGIST: CPT | Mod: 26

## 2018-10-12 PROCEDURE — 99232 SBSQ HOSP IP/OBS MODERATE 35: CPT

## 2018-10-12 PROCEDURE — 88342 IMHCHEM/IMCYTCHM 1ST ANTB: CPT | Mod: 26

## 2018-10-12 PROCEDURE — 43239 EGD BIOPSY SINGLE/MULTIPLE: CPT

## 2018-10-12 PROCEDURE — 99233 SBSQ HOSP IP/OBS HIGH 50: CPT

## 2018-10-12 RX ORDER — POTASSIUM CHLORIDE 20 MEQ
10 PACKET (EA) ORAL
Qty: 0 | Refills: 0 | Status: COMPLETED | OUTPATIENT
Start: 2018-10-12 | End: 2018-10-12

## 2018-10-12 RX ORDER — INSULIN LISPRO 100/ML
4 VIAL (ML) SUBCUTANEOUS ONCE
Qty: 0 | Refills: 0 | Status: COMPLETED | OUTPATIENT
Start: 2018-10-12 | End: 2018-10-12

## 2018-10-12 RX ORDER — POTASSIUM CHLORIDE 20 MEQ
40 PACKET (EA) ORAL ONCE
Qty: 0 | Refills: 0 | Status: COMPLETED | OUTPATIENT
Start: 2018-10-12 | End: 2018-10-12

## 2018-10-12 RX ADMIN — Medication 3: at 17:26

## 2018-10-12 RX ADMIN — PANTOPRAZOLE SODIUM 40 MILLIGRAM(S): 20 TABLET, DELAYED RELEASE ORAL at 19:14

## 2018-10-12 RX ADMIN — Medication 40 MILLIEQUIVALENT(S): at 14:20

## 2018-10-12 RX ADMIN — Medication 100 MILLIEQUIVALENT(S): at 15:25

## 2018-10-12 RX ADMIN — INSULIN GLARGINE 12 UNIT(S): 100 INJECTION, SOLUTION SUBCUTANEOUS at 23:35

## 2018-10-12 RX ADMIN — Medication 100 MILLIEQUIVALENT(S): at 16:32

## 2018-10-12 RX ADMIN — Medication 100 MILLIEQUIVALENT(S): at 14:22

## 2018-10-12 RX ADMIN — SODIUM CHLORIDE 75 MILLILITER(S): 9 INJECTION, SOLUTION INTRAVENOUS at 23:36

## 2018-10-12 RX ADMIN — Medication 4 UNIT(S): at 23:36

## 2018-10-12 NOTE — PROGRESS NOTE ADULT - ASSESSMENT
DM type 1, s/p DKA. Currently stable. however future prognosis uncertain unless pt. adheres with insulin and outpatient follow up. Offered pt. appt. with my group in the [place] office; she can call and ask for our endocrine NP (Veronica Molina) who can offer her a quick follow up appt.

## 2018-10-12 NOTE — PROGRESS NOTE ADULT - ASSESSMENT
18 year old patient with PMH of DM-1 and intermittent asthma came to the ED with the complaint of abdominal pain x 1 week. Pain is sharp, located in the epigastric region radiating to the back. Pain is exacerbated with food, alleviated with Motrin. She reported associated nausea and vomiting of 2 days. Vomitus is non-blood, non-billous. She has no fever, chills, chest pain, shortness of breath, palpitation, no diarrhea, dysuria, hematuria, no sick contact, no recent travel or change in diet. She reported similar episode in the past.       1) Epigastric pain likely due to acute pancreatitis vs PUD  - Lipase 548 has trended down  - Will continue pain medication with morphine PRN   - Zofran PRN for nausea/vomit  - now on clear liquid diet per GI  - per GI will rule PUD, EGD pending possibly today    2) Hepatomegaly/hepatic lesion  - CT abdomen showed: marked hepatomegaly. Solid lesion in the right lobe of the liver  - GI recs appreciated  - MRI: 2.9 cm lesion in the posterior segment of the right lobe is favored to represent an area of focal nodular hyperplasia,  Mild signal dropout on opposed phase imaging suggests intra-Voxel lipid raising the possibility of a hepatocellular adenoma.   - will discuss with GI regarding next step    3) Hypokalemia  - replace and monitor level    4) Hypomagnesemia   - resolved    5) CHALINO likely due to dehydration: resolved    6) DM-1  - Continue Lantus at half home dose 12units HS  - endocrinology recs appreciated  - Will hold Novolog 5units TID for now   - Insulin sliding scale     7) Asthma: stable   - Albuterol PRN     8) Anemia   - seems to be at baseline  - likely due to menstruation  - iron studies normal, will check stool, b12 and folic acid for completion    9) Prophylactic Measure:  - DVT prophylaxis: ambulate 18 year old patient with PMH of DM-1 and intermittent asthma came to the ED with the complaint of abdominal pain x 1 week. Pain is sharp, located in the epigastric region radiating to the back. Pain is exacerbated with food, alleviated with Motrin. She reported associated nausea and vomiting of 2 days. Vomitus is non-blood, non-billous. She has no fever, chills, chest pain, shortness of breath, palpitation, no diarrhea, dysuria, hematuria, no sick contact, no recent travel or change in diet. She reported similar episode in the past.     pt was started on po diet tolerating well, GI following and will get EGD to eval for PUD today.     1) Epigastric pain likely due to acute pancreatitis vs PUD  - Lipase 548 has trended down  - Will continue pain medication with morphine PRN   - Zofran PRN for nausea/vomit  - now on clear liquid diet per GI  - per GI will rule PUD, EGD pending possibly today    2) Hepatomegaly/hepatic lesion  - CT abdomen showed: marked hepatomegaly. Solid lesion in the right lobe of the liver  - GI recs appreciated  - MRI: 2.9 cm lesion in the posterior segment of the right lobe is favored to represent an area of focal nodular hyperplasia,  Mild signal dropout on opposed phase imaging suggests intra-Voxel lipid raising the possibility of a hepatocellular adenoma.   - will discuss with GI regarding next step    3) Hypokalemia  - replace and monitor level    4) Hypomagnesemia   - resolved    5) CHALINO likely due to dehydration: resolved    6) DM-1  - Continue Lantus at half home dose 12units HS  - endocrinology recs appreciated  - Will hold Novolog 5units TID for now   - Insulin sliding scale     7) Asthma: stable   - Albuterol PRN     8) Anemia   - seems to be at baseline  - likely due to menstruation  - iron studies normal, will check stool, b12 and folic acid for completion    9) Prophylactic Measure:  - DVT prophylaxis: ambulate

## 2018-10-12 NOTE — BRIEF OPERATIVE NOTE - COMMENTS
A/P  normal EGD. Biopsy take to r/o HP from antrum and body  call office in one week for biopsy results  Give solid diabetic diet  May D/C home. Will sign off

## 2018-10-12 NOTE — PROGRESS NOTE ADULT - SUBJECTIVE AND OBJECTIVE BOX
INTERVAL HPI/OVERNIGHT EVENTS:  follow up of diabetes    MEDICATIONS  (STANDING):  dextrose 5% + sodium chloride 0.9%. 1000 milliLiter(s) (75 mL/Hr) IV Continuous <Continuous>  dextrose 5%. 1000 milliLiter(s) (50 mL/Hr) IV Continuous <Continuous>  dextrose 50% Injectable 12.5 Gram(s) IV Push once  dextrose 50% Injectable 25 Gram(s) IV Push once  dextrose 50% Injectable 25 Gram(s) IV Push once  influenza   Vaccine 0.5 milliLiter(s) IntraMuscular once  insulin glargine Injectable (LANTUS) 12 Unit(s) SubCutaneous at bedtime  insulin lispro (HumaLOG) corrective regimen sliding scale   SubCutaneous three times a day before meals  pantoprazole  Injectable 40 milliGRAM(s) IV Push two times a day  potassium chloride  10 mEq/100 mL IVPB 10 milliEquivalent(s) IV Intermittent every 1 hour    MEDICATIONS  (PRN):  ALBUTerol    90 MICROgram(s) HFA Inhaler 2 Puff(s) Inhalation every 6 hours PRN Shortness of Breath and/or Wheezing  dextrose 40% Gel 15 Gram(s) Oral once PRN Blood Glucose LESS THAN 70 milliGRAM(s)/deciliter  glucagon  Injectable 1 milliGRAM(s) IntraMuscular once PRN Glucose LESS THAN 70 milligrams/deciliter  morphine  - Injectable 2 milliGRAM(s) IV Push every 6 hours PRN Moderate Pain (4 - 6)  ondansetron Injectable 4 milliGRAM(s) IV Push every 6 hours PRN Nausea and/or Vomiting      Allergies    amoxicillin (Hives)  Mushrooms (Hives)  penicillin (Unknown)  Prozac (Unknown)    Intolerances        Review of systems:  improved appetite    Vital Signs Last 24 Hrs  T(C): 36.9 (12 Oct 2018 10:08), Max: 36.9 (11 Oct 2018 21:26)  T(F): 98.4 (12 Oct 2018 10:08), Max: 98.5 (12 Oct 2018 05:20)  HR: 96 (12 Oct 2018 10:08) (91 - 96)  BP: 128/88 (12 Oct 2018 10:08) (115/83 - 131/97)  BP(mean): --  RR: 18 (12 Oct 2018 10:08) (18 - 18)  SpO2: 99% (12 Oct 2018 10:08) (97% - 99%)        LABS:                        8.9    3.0   )-----------( 339      ( 12 Oct 2018 07:18 )             27.7     10-12    139  |  106  |  7.0<L>  ----------------------------<  124<H>  3.2<L>   |  18.0<L>  |  0.65    Ca    8.8      12 Oct 2018 07:18  Mg     1.8     10-12    TPro  5.8<L>  /  Alb  3.2<L>  /  TBili  0.3<L>  /  DBili  x   /  AST  37<H>  /  ALT  20  /  AlkPhos  66  10-11          POCT Blood Glucose.: 111 mg/dL (10-12-18 @ 12:59)  POCT Blood Glucose.: 110 mg/dL (10-12-18 @ 08:25)  POCT Blood Glucose.: 316 mg/dL (10-12-18 @ 04:25)  POCT Blood Glucose.: 306 mg/dL (10-12-18 @ 00:32)  POCT Blood Glucose.: 249 mg/dL (10-11-18 @ 20:23)  POCT Blood Glucose.: 286 mg/dL (10-11-18 @ 17:08)  POCT Blood Glucose.: 137 mg/dL (10-11-18 @ 12:52)  POCT Blood Glucose.: 98 mg/dL (10-11-18 @ 08:20)  POCT Blood Glucose.: 83 mg/dL (10-11-18 @ 04:23)  POCT Blood Glucose.: 105 mg/dL (10-11-18 @ 00:02)  POCT Blood Glucose.: 100 mg/dL (10-10-18 @ 20:12)  POCT Blood Glucose.: 109 mg/dL (10-10-18 @ 16:12)  POCT Blood Glucose.: 195 mg/dL (10-10-18 @ 12:33)  POCT Blood Glucose.: 47 mg/dL (10-10-18 @ 12:04)  POCT Blood Glucose.: 43 mg/dL (10-10-18 @ 12:00)  POCT Blood Glucose.: 202 mg/dL (10-10-18 @ 07:56)  POCT Blood Glucose.: 43 mg/dL (10-10-18 @ 07:35)  POCT Blood Glucose.: 173 mg/dL (10-10-18 @ 01:37)  POCT Blood Glucose.: 424 mg/dL (10-10-18 @ 00:21)  POCT Blood Glucose.: 457 mg/dL (10-09-18 @ 21:13)      RADIOLOGY & ADDITIONAL TESTS:

## 2018-10-12 NOTE — BRIEF OPERATIVE NOTE - POST-OP DX
Nausea and vomiting, intractability of vomiting not specified, unspecified vomiting type  10/12/2018    Active  Lauryn Sanders

## 2018-10-12 NOTE — ADVANCED PRACTICE NURSE CONSULT - ASSESSMENT
return to see pt in am, pt is @ a test, family @ bedside. as per pt request information about dr jha provided. family was advised to fu  w endocrine to improve glycemic control

## 2018-10-12 NOTE — PROGRESS NOTE ADULT - SUBJECTIVE AND OBJECTIVE BOX
SOFIE STATON    [id number]    18y      Female    CC: Acute pancreatitis/ liver cyst-lesion    INTERVAL HPI/OVERNIGHT EVENTS:  18 year old patient with PMH of DM-1 and intermittent asthma came to the ED with the complaint of abdominal pain x 1 week. Pain is sharp, located in the epigastric region radiating to the back. Pain is exacerbated with food, alleviated with Motrin. She reported associated nausea and vomiting of 2 days. Vomitus is non-blood, non-billous. She has no fever, chills, chest pain, shortness of breath, palpitation, no diarrhea, dysuria, hematuria, no sick contact, no recent travel or change in diet. She reported similar episode in the past. Pt admitted for acute pancreatitis     pt was started on po diet tolerating well, GI following and will get EGD to eval for PUD today.     pt stated her abd pain this morning is better. no n/v at this time no fevers, no sob    REVIEW OF SYSTEMS:    CONSTITUTIONAL: No fever, weight loss, or fatigue  RESPIRATORY: No cough, wheezing, hemoptysis; No shortness of breath  CARDIOVASCULAR: No chest pain, palpitations  GASTROINTESTINAL: min abdominal or epigastric pain. No nausea, vomiting        Vital Signs Last 24 Hrs  T(C): 36.9 (12 Oct 2018 10:08), Max: 36.9 (11 Oct 2018 21:26)  T(F): 98.4 (12 Oct 2018 10:08), Max: 98.5 (12 Oct 2018 05:20)  HR: 96 (12 Oct 2018 10:08) (91 - 96)  BP: 128/88 (12 Oct 2018 10:08) (115/83 - 131/97)  BP(mean): --  RR: 18 (12 Oct 2018 10:08) (18 - 18)  SpO2: 99% (12 Oct 2018 10:08) (97% - 99%)    PHYSICAL EXAM:    GENERAL: NAD, well-groomed  HEENT: PERRL, +EOMI  CHEST/LUNG: Clear to auscultation bilaterally; No wheezing, no crackles  HEART: S1S2+, Regular rate and rhythm; No murmurs  ABDOMEN: Soft, Nontender, Nondistended; Bowel sounds present  EXTREMITIES:  2+ Peripheral Pulses, No clubbing, cyanosis, or edema      LABS:                        8.9    3.0   )-----------( 339      ( 12 Oct 2018 07:18 )             27.7     10-12    139  |  106  |  7.0<L>  ----------------------------<  124<H>  3.2<L>   |  18.0<L>  |  0.65    Ca    8.8      12 Oct 2018 07:18  Mg     1.8     10-12    TPro  5.8<L>  /  Alb  3.2<L>  /  TBili  0.3<L>  /  DBili  x   /  AST  37<H>  /  ALT  20  /  AlkPhos  66  10-11        MEDICATIONS  (STANDING):  dextrose 5% + sodium chloride 0.9%. 1000 milliLiter(s) (75 mL/Hr) IV Continuous <Continuous>  dextrose 5%. 1000 milliLiter(s) (50 mL/Hr) IV Continuous <Continuous>  dextrose 50% Injectable 12.5 Gram(s) IV Push once  dextrose 50% Injectable 25 Gram(s) IV Push once  dextrose 50% Injectable 25 Gram(s) IV Push once  influenza   Vaccine 0.5 milliLiter(s) IntraMuscular once  insulin glargine Injectable (LANTUS) 12 Unit(s) SubCutaneous at bedtime  insulin lispro (HumaLOG) corrective regimen sliding scale   SubCutaneous three times a day before meals  pantoprazole  Injectable 40 milliGRAM(s) IV Push two times a day  potassium chloride    Tablet ER 40 milliEquivalent(s) Oral once  potassium chloride  10 mEq/100 mL IVPB 10 milliEquivalent(s) IV Intermittent every 1 hour    MEDICATIONS  (PRN):  ALBUTerol    90 MICROgram(s) HFA Inhaler 2 Puff(s) Inhalation every 6 hours PRN Shortness of Breath and/or Wheezing  dextrose 40% Gel 15 Gram(s) Oral once PRN Blood Glucose LESS THAN 70 milliGRAM(s)/deciliter  glucagon  Injectable 1 milliGRAM(s) IntraMuscular once PRN Glucose LESS THAN 70 milligrams/deciliter  morphine  - Injectable 2 milliGRAM(s) IV Push every 6 hours PRN Moderate Pain (4 - 6)  ondansetron Injectable 4 milliGRAM(s) IV Push every 6 hours PRN Nausea and/or Vomiting      RADIOLOGY & ADDITIONAL TESTS:  MRI abd:  FINDINGS:    LIVER: Hepatomegaly measuring 27 cm in length. Normal morphology. Patent   hepatic vasculature including portal and hepatic veins. No varices.   Conventional arterial anatomy.     Diffuse low signal throughout the liver on all pulse sequences along with   hyperattenuation on CT may reflect iron deposition.    2.9 x 2.7 cm lesion in question in the posterior segment of the right   lobe shows mild diffusion restriction (8; 69). There is suggestion of   microscopic fat on opposed phase images. There is a T2 hyperintense   central scar. There is faint enhancement on the arterial phase with   progressive mild enhancement on delayed phases and enhancement of the   central scar.    SPLEEN: Normal.    PANCREAS: Atrophy for age. No main duct dilatation. Mild peripancreatic   fluid in the tail region.    GALLBLADDER/BILIARY TREE: Nondilated. Normal gallbladder.  ADRENALS: Normal.  KIDNEYS: Symmetric nephrograms. No hydronephrosis.  LYMPHADENOPATHY/RETROPERITONEUM: No adenopathy.  VASCULATURE: Normal caliber aorta.  BOWEL: No bowel-related abnormality.  PERITONEUM/ABDOMINAL WALL: No ascites.  SKELETAL: No aggressive lesion.  LUNG BASES: Clear.    IMPRESSION:    Hepatomegaly. Diffuse low signal changes throughout the liver along with   hyperattenuation on CT suggestive of iron deposition.    2.9 cm lesion in the posterior segment of the right lobe is favored to   represent an area of focal nodular hyperplasia, although the appearance   is atypical, likely secondary to signal changes in the background liver   parenchyma. Mild signal dropout on opposed phase imaging suggests   intra-Voxel lipid raising the possibility of a hepatocellular adenoma.   Correlate with history of OCP use. Consider six-month follow-up MRI   utilizing hepatobiliary contrast agent (Eovist) for differentiation   between an FNH and adenoma.    Atrophic pancreas for age. Slight peripancreatic fluid in the tail   region. Correlate with labs for acute pancreatitis.    No gallstones or biliary dilatation.    ALKA LARA M.D., ATTENDING RADIOLOGIST  This document has been electronically signed. Oct 11 2018  4:25PM

## 2018-10-13 LAB
ALBUMIN SERPL ELPH-MCNC: 3.4 G/DL — SIGNIFICANT CHANGE UP (ref 3.3–5.2)
ALP SERPL-CCNC: 87 U/L — SIGNIFICANT CHANGE UP (ref 40–120)
ALT FLD-CCNC: 25 U/L — SIGNIFICANT CHANGE UP
ANION GAP SERPL CALC-SCNC: 14 MMOL/L — SIGNIFICANT CHANGE UP (ref 5–17)
AST SERPL-CCNC: 50 U/L — HIGH
BILIRUB SERPL-MCNC: <0.2 MG/DL — LOW (ref 0.4–2)
BUN SERPL-MCNC: 8 MG/DL — SIGNIFICANT CHANGE UP (ref 8–20)
CALCIUM SERPL-MCNC: 8.8 MG/DL — SIGNIFICANT CHANGE UP (ref 8.6–10.2)
CHLORIDE SERPL-SCNC: 105 MMOL/L — SIGNIFICANT CHANGE UP (ref 98–107)
CO2 SERPL-SCNC: 22 MMOL/L — SIGNIFICANT CHANGE UP (ref 22–29)
CREAT SERPL-MCNC: 0.6 MG/DL — SIGNIFICANT CHANGE UP (ref 0.5–1.3)
GLUCOSE BLDC GLUCOMTR-MCNC: 108 MG/DL — HIGH (ref 70–99)
GLUCOSE BLDC GLUCOMTR-MCNC: 227 MG/DL — HIGH (ref 70–99)
GLUCOSE BLDC GLUCOMTR-MCNC: 299 MG/DL — HIGH (ref 70–99)
GLUCOSE BLDC GLUCOMTR-MCNC: 318 MG/DL — HIGH (ref 70–99)
GLUCOSE SERPL-MCNC: 103 MG/DL — SIGNIFICANT CHANGE UP (ref 70–115)
HCT VFR BLD CALC: 27.4 % — LOW (ref 37–47)
HGB BLD-MCNC: 8.9 G/DL — LOW (ref 12–16)
MAGNESIUM SERPL-MCNC: 1.7 MG/DL — SIGNIFICANT CHANGE UP (ref 1.6–2.6)
MCHC RBC-ENTMCNC: 27.8 PG — SIGNIFICANT CHANGE UP (ref 27–31)
MCHC RBC-ENTMCNC: 32.5 G/DL — SIGNIFICANT CHANGE UP (ref 32–36)
MCV RBC AUTO: 85.6 FL — SIGNIFICANT CHANGE UP (ref 81–99)
PLATELET # BLD AUTO: 341 K/UL — SIGNIFICANT CHANGE UP (ref 150–400)
POTASSIUM SERPL-MCNC: 3.4 MMOL/L — LOW (ref 3.5–5.3)
POTASSIUM SERPL-SCNC: 3.4 MMOL/L — LOW (ref 3.5–5.3)
PROT SERPL-MCNC: 5.9 G/DL — LOW (ref 6.6–8.7)
RBC # BLD: 3.2 M/UL — LOW (ref 4.4–5.2)
RBC # FLD: 15.4 % — SIGNIFICANT CHANGE UP (ref 11–15.6)
SODIUM SERPL-SCNC: 141 MMOL/L — SIGNIFICANT CHANGE UP (ref 135–145)
WBC # BLD: 3.5 K/UL — LOW (ref 4.8–10.8)
WBC # FLD AUTO: 3.5 K/UL — LOW (ref 4.8–10.8)

## 2018-10-13 PROCEDURE — 99232 SBSQ HOSP IP/OBS MODERATE 35: CPT

## 2018-10-13 RX ORDER — MAGNESIUM SULFATE 500 MG/ML
1 VIAL (ML) INJECTION ONCE
Qty: 0 | Refills: 0 | Status: COMPLETED | OUTPATIENT
Start: 2018-10-13 | End: 2018-10-13

## 2018-10-13 RX ORDER — POTASSIUM CHLORIDE 20 MEQ
40 PACKET (EA) ORAL ONCE
Qty: 0 | Refills: 0 | Status: COMPLETED | OUTPATIENT
Start: 2018-10-13 | End: 2018-10-13

## 2018-10-13 RX ORDER — METOCLOPRAMIDE HCL 10 MG
5 TABLET ORAL
Qty: 0 | Refills: 0 | Status: DISCONTINUED | OUTPATIENT
Start: 2018-10-13 | End: 2018-10-14

## 2018-10-13 RX ADMIN — Medication 40 MILLIEQUIVALENT(S): at 18:09

## 2018-10-13 RX ADMIN — Medication 2: at 12:29

## 2018-10-13 RX ADMIN — PANTOPRAZOLE SODIUM 40 MILLIGRAM(S): 20 TABLET, DELAYED RELEASE ORAL at 18:13

## 2018-10-13 RX ADMIN — PANTOPRAZOLE SODIUM 40 MILLIGRAM(S): 20 TABLET, DELAYED RELEASE ORAL at 12:31

## 2018-10-13 RX ADMIN — Medication 4: at 18:09

## 2018-10-13 RX ADMIN — SODIUM CHLORIDE 75 MILLILITER(S): 9 INJECTION, SOLUTION INTRAVENOUS at 12:36

## 2018-10-13 RX ADMIN — INSULIN GLARGINE 12 UNIT(S): 100 INJECTION, SOLUTION SUBCUTANEOUS at 23:11

## 2018-10-13 RX ADMIN — Medication 5 MILLIGRAM(S): at 18:10

## 2018-10-13 RX ADMIN — Medication 100 GRAM(S): at 18:09

## 2018-10-13 NOTE — PROGRESS NOTE ADULT - REASON FOR ADMISSION
Epigastric pain

## 2018-10-13 NOTE — PROGRESS NOTE ADULT - SUBJECTIVE AND OBJECTIVE BOX
Internal Medicine Hospitalist - Dr. Lemuel STATON    [id number]    18y      Female    Patient is a 18y old  Female who presents with a chief complaint of Epigastric pain (13 Oct 2018 10:56)    INTERVAL HPI/ OVERNIGHT EVENTS: Patient is seen and examined, pt report RUQ pain worse after eating, denied nausea, vomiting.     REVIEW OF SYSTEMS:    Denied fever, chills, nausea, vomiting, chest pain, SOB, headache, dizziness    PHYSICAL EXAM:    Vital Signs Last 24 Hrs  T(C): 36.7 (13 Oct 2018 12:25), Max: 37 (13 Oct 2018 05:25)  T(F): 98.1 (13 Oct 2018 12:25), Max: 98.6 (13 Oct 2018 05:25)  HR: 85 (13 Oct 2018 12:25) (85 - 103)  BP: 116/78 (13 Oct 2018 12:25) (116/78 - 134/99)  BP(mean): --  RR: 18 (13 Oct 2018 12:25) (18 - 18)  SpO2: 99% (13 Oct 2018 12:25) (99% - 100%)    GENERAL: NAD  CHEST/LUNG: CTA b/l   HEART: S1S2+ audible  ABDOMEN: Soft, sensitive RUQ, Nondistended; Bowel sounds present  EXTREMITIES:  no edema  CNS: AAO X 3    LABS:                        8.9    3.5   )-----------( 341      ( 13 Oct 2018 08:27 )             27.4     10-13    141  |  105  |  8.0  ----------------------------<  103  3.4<L>   |  22.0  |  0.60    Ca    8.8      13 Oct 2018 08:27  Mg     1.7     10-13    TPro  5.9<L>  /  Alb  3.4  /  TBili  <0.2<L>  /  DBili  x   /  AST  50<H>  /  ALT  25  /  AlkPhos  87  10-13            MEDICATIONS  (STANDING):  dextrose 5% + sodium chloride 0.9%. 1000 milliLiter(s) (75 mL/Hr) IV Continuous <Continuous>  dextrose 5%. 1000 milliLiter(s) (50 mL/Hr) IV Continuous <Continuous>  dextrose 50% Injectable 12.5 Gram(s) IV Push once  dextrose 50% Injectable 25 Gram(s) IV Push once  dextrose 50% Injectable 25 Gram(s) IV Push once  influenza   Vaccine 0.5 milliLiter(s) IntraMuscular once  insulin glargine Injectable (LANTUS) 12 Unit(s) SubCutaneous at bedtime  insulin lispro (HumaLOG) corrective regimen sliding scale   SubCutaneous three times a day before meals  magnesium sulfate  IVPB 1 Gram(s) IV Intermittent once  metoclopramide 5 milliGRAM(s) Oral three times a day before meals  pantoprazole  Injectable 40 milliGRAM(s) IV Push two times a day  potassium chloride    Tablet ER 40 milliEquivalent(s) Oral once    MEDICATIONS  (PRN):  ALBUTerol    90 MICROgram(s) HFA Inhaler 2 Puff(s) Inhalation every 6 hours PRN Shortness of Breath and/or Wheezing  dextrose 40% Gel 15 Gram(s) Oral once PRN Blood Glucose LESS THAN 70 milliGRAM(s)/deciliter  glucagon  Injectable 1 milliGRAM(s) IntraMuscular once PRN Glucose LESS THAN 70 milligrams/deciliter  morphine  - Injectable 2 milliGRAM(s) IV Push every 6 hours PRN Moderate Pain (4 - 6)  ondansetron Injectable 4 milliGRAM(s) IV Push every 6 hours PRN Nausea and/or Vomiting      RADIOLOGY & ADDITIONAL TEST    normal EGD. Biopsy of antrum and body to r/o HP

## 2018-10-13 NOTE — PROGRESS NOTE ADULT - ASSESSMENT
18 year old patient with PMH of DM-1 and intermittent asthma came to the ED with the complaint of abdominal pain x 1 week. Pain is sharp, located in the epigastric region radiating to the back. Pain is exacerbated with food, alleviated with Motrin. She reported associated nausea and vomiting of 2 days. Vomitus is non-blood, non-billous. She has no fever, chills, chest pain, shortness of breath, palpitation, no diarrhea, dysuria, hematuria, no sick contact, no recent travel or change in diet. She reported similar episode in the past. Pt is s/p EGD - normal    A/P    1) Epigastric pain likely due to acute pancreatitis vs PUD  - Lipase 548 has trended down  - Will continue pain medication with morphine PRN   - Zofran PRN for nausea/vomit  - appreciate GI - s/p EGD normal  - still c/o pain - start reglan standing AC    2) Hepatomegaly/hepatic lesion  - CT abdomen showed: marked hepatomegaly. Solid lesion in the right lobe of the liver  - GI recs appreciated  - MRI: 2.9 cm lesion in the posterior segment of the right lobe is favored to represent an area of focal nodular hyperplasia,  Mild signal dropout on opposed phase imaging suggests intra-Voxel lipid raising the possibility of a hepatocellular adenoma.   - discussed with GI - lesion is likely fatty deposition    3) Hypokalemia - supplemented   f/u BMP     4) Hypomagnesemia   - resolved    5) CHALINO likely due to dehydration: resolved    6) DM-1  - Continue Lantus at half home dose 12units HS  - endocrinology recs appreciated  - Will hold Novolog 5units TID for now   - Insulin sliding scale     7) Asthma: stable   - Albuterol PRN     8) Anemia   - seems to be at baseline  - likely due to menstruation  - iron studies normal, will check stool, b12 and folic acid for completion    9) Prophylactic Measure:  - DVT prophylaxis: ambulate 18 year old patient with PMH of DM-1 and intermittent asthma came to the ED with the complaint of abdominal pain x 1 week. Pain is sharp, located in the epigastric region radiating to the back. Pain is exacerbated with food, alleviated with Motrin. She reported associated nausea and vomiting of 2 days. Vomitus is non-blood, non-billous. She has no fever, chills, chest pain, shortness of breath, palpitation, no diarrhea, dysuria, hematuria, no sick contact, no recent travel or change in diet. She reported similar episode in the past. Pt is s/p EGD - normal    A/P    1) Epigastric pain likely due to acute pancreatitis - ruled out PUD  - Lipase 548 has trended down  - Will continue pain medication with morphine PRN   - Zofran PRN for nausea/vomit  - appreciate GI - s/p EGD normal  - still c/o pain - start reglan standing AC    2) Hepatomegaly/hepatic lesion  - CT abdomen showed: marked hepatomegaly. Solid lesion in the right lobe of the liver  - GI recs appreciated  - MRI: 2.9 cm lesion in the posterior segment of the right lobe is favored to represent an area of focal nodular hyperplasia,  Mild signal dropout on opposed phase imaging suggests intra-Voxel lipid raising the possibility of a hepatocellular adenoma.   - discussed with GI - lesion is likely fatty deposition    3) Hypokalemia - supplemented   f/u BMP     4) Hypomagnesemia   - resolved    5) CHALINO likely due to dehydration: resolved    6) DM-1  - Continue Lantus at half home dose 12units HS  - endocrinology recs appreciated  - Will hold Novolog 5units TID for now   - Insulin sliding scale     7) Asthma: stable   - Albuterol PRN     8) Anemia   - seems to be at baseline  - likely due to menstruation  - iron studies normal, will check stool, b12 and folic acid for completion    9) Prophylactic Measure:  - DVT prophylaxis: ambulate

## 2018-10-13 NOTE — PROGRESS NOTE ADULT - SUBJECTIVE AND OBJECTIVE BOX
Patient is a 18y old  Female who presents with a chief complaint of Epigastric pain (12 Oct 2018 15:18)      HPI:  18 year old patient with PMH of DM-1 and intermittent asthma came to the ED with the complaint of abdominal pain x 1 week.  EGd negative yesterday. TOlerated solid diet yesterday.   REVIEW OF SYSTEMS:  Constitutional: No fever, weight loss or fatigue  ENMT:  No difficulty hearing, tinnitus, vertigo; No sinus or throat pain  Respiratory: No cough, wheezing, chills or hemoptysis  Cardiovascular: No chest pain, palpitations, dizziness or leg swelling  Gastrointestinal: No abdominal or epigastric pain. No nausea, vomiting or hematemesis; No diarrhea or constipation. No melena or hematochezia.  Skin: No itching, burning, rashes or lesions   Musculoskeletal: No joint pain or swelling; No muscle, back or extremity pain    PAST MEDICAL & SURGICAL HISTORY:  Diabetes: type I  Asthma   delivery delivered      FAMILY HISTORY:  Family history of diabetes mellitus in father (Father)      SOCIAL HISTORY:  Smoking Status: [ ] Current, [ ] Former, [ ] Never  Pack Years:  [  ] EtOH-no  [  ] IVDA    MEDICATIONS:  MEDICATIONS  (STANDING):  dextrose 5% + sodium chloride 0.9%. 1000 milliLiter(s) (75 mL/Hr) IV Continuous <Continuous>  dextrose 5%. 1000 milliLiter(s) (50 mL/Hr) IV Continuous <Continuous>  dextrose 50% Injectable 12.5 Gram(s) IV Push once  dextrose 50% Injectable 25 Gram(s) IV Push once  dextrose 50% Injectable 25 Gram(s) IV Push once  influenza   Vaccine 0.5 milliLiter(s) IntraMuscular once  insulin glargine Injectable (LANTUS) 12 Unit(s) SubCutaneous at bedtime  insulin lispro (HumaLOG) corrective regimen sliding scale   SubCutaneous three times a day before meals  pantoprazole  Injectable 40 milliGRAM(s) IV Push two times a day    MEDICATIONS  (PRN):  ALBUTerol    90 MICROgram(s) HFA Inhaler 2 Puff(s) Inhalation every 6 hours PRN Shortness of Breath and/or Wheezing  dextrose 40% Gel 15 Gram(s) Oral once PRN Blood Glucose LESS THAN 70 milliGRAM(s)/deciliter  glucagon  Injectable 1 milliGRAM(s) IntraMuscular once PRN Glucose LESS THAN 70 milligrams/deciliter  morphine  - Injectable 2 milliGRAM(s) IV Push every 6 hours PRN Moderate Pain (4 - 6)  ondansetron Injectable 4 milliGRAM(s) IV Push every 6 hours PRN Nausea and/or Vomiting      Allergies    amoxicillin (Hives)  Mushrooms (Hives)  penicillin (Unknown)  Prozac (Unknown)    Intolerances        Vital Signs Last 24 Hrs  T(C): 37 (13 Oct 2018 05:25), Max: 37 (13 Oct 2018 05:25)  T(F): 98.6 (13 Oct 2018 05:25), Max: 98.6 (13 Oct 2018 05:25)  HR: 93 (13 Oct 2018 05:25) (93 - 103)  BP: 125/95 (13 Oct 2018 05:25) (119/82 - 134/99)  BP(mean): --  RR: 18 (13 Oct 2018 05:25) (18 - 18)  SpO2: 99% (12 Oct 2018 21:15) (99% - 100%)    10-12 @ 07:01  -  10-13 @ 07:00  --------------------------------------------------------  IN: 1200 mL / OUT: 0 mL / NET: 1200 mL          PHYSICAL EXAM:    General: Well developed; well nourished; in no acute distress  HEENT: MMM, conjunctiva and sclera clear  H- RRR  l- CTA  Gastrointestinal: Soft, non-tender non-distended; Normal bowel sounds; No rebound or guarding  Extremities: Normal range of motion, No clubbing, cyanosis or edema  Neurological: Alert and oriented x3  Skin: Warm and dry. No obvious rash      LABS:                        8.9    3.5   )-----------( 341      ( 13 Oct 2018 08:27 )             27.4     13 Oct 2018 08:27    141    |  105    |  8.0    ----------------------------<  103    3.4     |  22.0   |  0.60     Ca    8.8        13 Oct 2018 08:27  Mg     1.7       13 Oct 2018 08:27    TPro  5.9    /  Alb  3.4    /  TBili  <0.2   /  DBili  x      /  AST  50     /  ALT  25     /  AlkPhos  87     / Amylase x      /Lipase x      13 Oct 2018 08:27              RADIOLOGY & ADDITIONAL STUDIES:

## 2018-10-14 ENCOUNTER — TRANSCRIPTION ENCOUNTER (OUTPATIENT)
Age: 18
End: 2018-10-14

## 2018-10-14 VITALS
OXYGEN SATURATION: 98 % | TEMPERATURE: 98 F | DIASTOLIC BLOOD PRESSURE: 85 MMHG | HEART RATE: 94 BPM | SYSTOLIC BLOOD PRESSURE: 123 MMHG | RESPIRATION RATE: 18 BRPM

## 2018-10-14 LAB
ALBUMIN SERPL ELPH-MCNC: 3.3 G/DL — SIGNIFICANT CHANGE UP (ref 3.3–5.2)
ALP SERPL-CCNC: 82 U/L — SIGNIFICANT CHANGE UP (ref 40–120)
ALT FLD-CCNC: 22 U/L — SIGNIFICANT CHANGE UP
ANION GAP SERPL CALC-SCNC: 15 MMOL/L — SIGNIFICANT CHANGE UP (ref 5–17)
AST SERPL-CCNC: 41 U/L — HIGH
BILIRUB SERPL-MCNC: <0.2 MG/DL — LOW (ref 0.4–2)
BUN SERPL-MCNC: 11 MG/DL — SIGNIFICANT CHANGE UP (ref 8–20)
CALCIUM SERPL-MCNC: 8.3 MG/DL — LOW (ref 8.6–10.2)
CHLORIDE SERPL-SCNC: 101 MMOL/L — SIGNIFICANT CHANGE UP (ref 98–107)
CO2 SERPL-SCNC: 22 MMOL/L — SIGNIFICANT CHANGE UP (ref 22–29)
CREAT SERPL-MCNC: 0.6 MG/DL — SIGNIFICANT CHANGE UP (ref 0.5–1.3)
FOLATE RBC-MCNC: 1146 NG/ML — SIGNIFICANT CHANGE UP (ref 499–1504)
GLUCOSE BLDC GLUCOMTR-MCNC: 127 MG/DL — HIGH (ref 70–99)
GLUCOSE BLDC GLUCOMTR-MCNC: 82 MG/DL — SIGNIFICANT CHANGE UP (ref 70–99)
GLUCOSE SERPL-MCNC: 117 MG/DL — HIGH (ref 70–115)
HCT VFR BLD CALC: 30 % — LOW (ref 34–45)
POTASSIUM SERPL-MCNC: 3.8 MMOL/L — SIGNIFICANT CHANGE UP (ref 3.5–5.3)
POTASSIUM SERPL-SCNC: 3.8 MMOL/L — SIGNIFICANT CHANGE UP (ref 3.5–5.3)
PROT SERPL-MCNC: 5.7 G/DL — LOW (ref 6.6–8.7)
SODIUM SERPL-SCNC: 138 MMOL/L — SIGNIFICANT CHANGE UP (ref 135–145)

## 2018-10-14 PROCEDURE — 83880 ASSAY OF NATRIURETIC PEPTIDE: CPT

## 2018-10-14 PROCEDURE — 84703 CHORIONIC GONADOTROPIN ASSAY: CPT

## 2018-10-14 PROCEDURE — 99239 HOSP IP/OBS DSCHRG MGMT >30: CPT

## 2018-10-14 PROCEDURE — 87581 M.PNEUMON DNA AMP PROBE: CPT

## 2018-10-14 PROCEDURE — 84466 ASSAY OF TRANSFERRIN: CPT

## 2018-10-14 PROCEDURE — 82728 ASSAY OF FERRITIN: CPT

## 2018-10-14 PROCEDURE — 83550 IRON BINDING TEST: CPT

## 2018-10-14 PROCEDURE — 85610 PROTHROMBIN TIME: CPT

## 2018-10-14 PROCEDURE — 71045 X-RAY EXAM CHEST 1 VIEW: CPT

## 2018-10-14 PROCEDURE — 83690 ASSAY OF LIPASE: CPT

## 2018-10-14 PROCEDURE — 84443 ASSAY THYROID STIM HORMONE: CPT

## 2018-10-14 PROCEDURE — 90686 IIV4 VACC NO PRSV 0.5 ML IM: CPT

## 2018-10-14 PROCEDURE — 80048 BASIC METABOLIC PNL TOTAL CA: CPT

## 2018-10-14 PROCEDURE — 87798 DETECT AGENT NOS DNA AMP: CPT

## 2018-10-14 PROCEDURE — 82607 VITAMIN B-12: CPT

## 2018-10-14 PROCEDURE — 85730 THROMBOPLASTIN TIME PARTIAL: CPT

## 2018-10-14 PROCEDURE — 83735 ASSAY OF MAGNESIUM: CPT

## 2018-10-14 PROCEDURE — 82105 ALPHA-FETOPROTEIN SERUM: CPT

## 2018-10-14 PROCEDURE — 84702 CHORIONIC GONADOTROPIN TEST: CPT

## 2018-10-14 PROCEDURE — 99285 EMERGENCY DEPT VISIT HI MDM: CPT | Mod: 25

## 2018-10-14 PROCEDURE — 87633 RESP VIRUS 12-25 TARGETS: CPT

## 2018-10-14 PROCEDURE — 93005 ELECTROCARDIOGRAM TRACING: CPT

## 2018-10-14 PROCEDURE — 96374 THER/PROPH/DIAG INJ IV PUSH: CPT

## 2018-10-14 PROCEDURE — 80053 COMPREHEN METABOLIC PANEL: CPT

## 2018-10-14 PROCEDURE — 82150 ASSAY OF AMYLASE: CPT

## 2018-10-14 PROCEDURE — 96375 TX/PRO/DX INJ NEW DRUG ADDON: CPT

## 2018-10-14 PROCEDURE — 85379 FIBRIN DEGRADATION QUANT: CPT

## 2018-10-14 PROCEDURE — 36415 COLL VENOUS BLD VENIPUNCTURE: CPT

## 2018-10-14 PROCEDURE — 85027 COMPLETE CBC AUTOMATED: CPT

## 2018-10-14 PROCEDURE — 80307 DRUG TEST PRSMV CHEM ANLYZR: CPT

## 2018-10-14 PROCEDURE — 82962 GLUCOSE BLOOD TEST: CPT

## 2018-10-14 PROCEDURE — 82747 ASSAY OF FOLIC ACID RBC: CPT

## 2018-10-14 PROCEDURE — 88305 TISSUE EXAM BY PATHOLOGIST: CPT

## 2018-10-14 PROCEDURE — 88342 IMHCHEM/IMCYTCHM 1ST ANTB: CPT

## 2018-10-14 PROCEDURE — 74176 CT ABD & PELVIS W/O CONTRAST: CPT

## 2018-10-14 PROCEDURE — 83036 HEMOGLOBIN GLYCOSYLATED A1C: CPT

## 2018-10-14 PROCEDURE — 87486 CHLMYD PNEUM DNA AMP PROBE: CPT

## 2018-10-14 PROCEDURE — 74182 MRI ABDOMEN W/CONTRAST: CPT

## 2018-10-14 RX ORDER — INSULIN ASPART 100 [IU]/ML
3 INJECTION, SOLUTION SUBCUTANEOUS
Qty: 1 | Refills: 0 | OUTPATIENT
Start: 2018-10-14 | End: 2018-11-12

## 2018-10-14 RX ORDER — PANTOPRAZOLE SODIUM 20 MG/1
1 TABLET, DELAYED RELEASE ORAL
Qty: 30 | Refills: 0 | OUTPATIENT
Start: 2018-10-14 | End: 2018-11-12

## 2018-10-14 RX ORDER — METOCLOPRAMIDE HCL 10 MG
1 TABLET ORAL
Qty: 30 | Refills: 0 | OUTPATIENT
Start: 2018-10-14 | End: 2018-10-23

## 2018-10-14 RX ORDER — ENOXAPARIN SODIUM 100 MG/ML
25 INJECTION SUBCUTANEOUS
Qty: 0 | Refills: 0 | COMMUNITY

## 2018-10-14 RX ADMIN — PANTOPRAZOLE SODIUM 40 MILLIGRAM(S): 20 TABLET, DELAYED RELEASE ORAL at 09:52

## 2018-10-14 RX ADMIN — INFLUENZA VIRUS VACCINE 0.5 MILLILITER(S): 15; 15; 15; 15 SUSPENSION INTRAMUSCULAR at 14:42

## 2018-10-14 RX ADMIN — Medication 5 MILLIGRAM(S): at 13:26

## 2018-10-14 RX ADMIN — Medication 5 MILLIGRAM(S): at 05:51

## 2018-10-14 NOTE — DISCHARGE NOTE ADULT - CARE PLAN
Principal Discharge DX:	Pancreatitis  Goal:	improved  Assessment and plan of treatment:	c/w PPI  if symptoms persist - f/u gastroenterology  Secondary Diagnosis:	Hepatic lesion  Assessment and plan of treatment:	per GI lesion is likely from fatty deposition  Secondary Diagnosis:	Type 1 diabetes mellitus without complication  Assessment and plan of treatment:	c/w Lantus 12, Novolog 3 units premeal - medication compliance  f/u endocrinology as an outpatient  Secondary Diagnosis:	CHALINO (acute kidney injury)  Goal:	resolved  Secondary Diagnosis:	Asthma  Assessment and plan of treatment:	c/w home medication  Secondary Diagnosis:	Anemia  Assessment and plan of treatment:	f/u cbc in 1 week  Secondary Diagnosis:	Hypokalemia  Goal:	resolved

## 2018-10-14 NOTE — DISCHARGE NOTE ADULT - PLAN OF CARE
improved c/w PPI  if symptoms persist - f/u gastroenterology per GI lesion is likely from fatty deposition c/w Lantus 12, Novolog 3 units premeal - medication compliance  f/u endocrinology as an outpatient resolved c/w home medication f/u cbc in 1 week

## 2018-10-14 NOTE — DISCHARGE NOTE ADULT - PATIENT PORTAL LINK FT
You can access the [facility] Patient Portal, offered by [facility], by registering with the following website: [email or web address]

## 2018-10-14 NOTE — DISCHARGE NOTE ADULT - MEDICATION SUMMARY - MEDICATIONS TO CHANGE
I will SWITCH the dose or number of times a day I take the medications listed below when I get home from the hospital:    NovoLOG FlexPen 100 units/mL injectable solution  -- 5 unit(s) injectable 3 times a day (with meals)   -- Check with your doctor before becoming pregnant.  Do not drink alcoholic beverages when taking this medication.  Keep in refrigerator.  Do not freeze.  Obtain medical advice before taking any non-prescription drugs as some may affect the action of this medication.    Lantus Solostar Pen 100 units/mL subcutaneous solution  -- 25 unit(s) subcutaneous once a day (at bedtime)

## 2018-10-14 NOTE — DISCHARGE NOTE ADULT - SECONDARY DIAGNOSIS.
Hepatic lesion Type 1 diabetes mellitus without complication CHALINO (acute kidney injury) Asthma Anemia Hypokalemia

## 2018-10-14 NOTE — DISCHARGE NOTE ADULT - HOSPITAL COURSE
18 year old patient with PMH of DM-1 and intermittent asthma came to the ED with the complaint of abdominal pain x 1 week. Pain is sharp, located in the epigastric region radiating to the back. Pain is exacerbated with food, alleviated with Motrin. She reported associated nausea and vomiting of 2 days. Vomitus is non-blood, non-billous, noted to have elevated lipase, seen by GI, cont. to report epigastric pain,  Pt is s/p EGD - normal, ruled out DU, discussed with Dr. Sanders, suggest to start Reglan, ?gastroparesis, feeling better today, FS is stable. Pt report she is feeling better, tolerating diet, denied abd. pain, nausea, vomiting.     A/P    1) Epigastric pain likely due to acute pancreatitis - ruled out PUD  - Lipase 548 has trended down  - appreciate GI - s/p EGD normal  - per Gi cont. Reglan    2) Hepatomegaly/hepatic lesion  - CT abdomen showed: marked hepatomegaly. Solid lesion in the right lobe of the liver  - GI recs appreciated  - MRI: 2.9 cm lesion in the posterior segment of the right lobe is favored to represent an area of focal nodular hyperplasia,  Mild signal dropout on opposed phase imaging suggests intra-Voxel lipid raising the possibility of a hepatocellular adenoma.   - discussed with GI - lesion is likely fatty deposition    3) Hypokalemia - resolved    4) Hypomagnesemia   - resolved    5) CHALINO likely due to dehydration: resolved    6) DM-1  - FS stable  - Continue Lantus at half home dose 12units HS, Novolog 3 units premeal - monitor FS at home  - endocrinology recs appreciated    7) Asthma: stable   - Albuterol PRN     8) Anemia   - seems to be at baseline  - likely due to menstruation  - f/u cbc     ICU Vital Signs Last 24 Hrs  T(C): 36.9 (14 Oct 2018 05:22), Max: 36.9 (13 Oct 2018 22:54)  T(F): 98.4 (14 Oct 2018 05:22), Max: 98.5 (13 Oct 2018 22:54)  HR: 92 (14 Oct 2018 05:22) (85 - 97)  BP: 116/80 (14 Oct 2018 05:22) (113/79 - 120/90)  RR: 18 (14 Oct 2018 05:22) (17 - 18)  SpO2: 100% (14 Oct 2018 05:22) (99% - 100%)    PHYSICAL EXAM:    GENERAL: NAD  CHEST/LUNG: Clear to percussion bilaterally; No rales, rhonchi, wheezing, or rubs  HEART: s1/s2 audible   ABDOMEN: Soft, Nontender, Nondistended; Bowel sounds present  EXTREMITIES: no edema     Time spent 36 minutes

## 2018-10-14 NOTE — DISCHARGE NOTE ADULT - MEDICATION SUMMARY - MEDICATIONS TO TAKE
I will START or STAY ON the medications listed below when I get home from the hospital:    Rob Mcnallyostar Pen 100 units/mL subcutaneous solution  -- 12 unit(s) subcutaneous once a day (at bedtime)  -- Indication: For dm    NovoLOG FlexPen 100 units/mL injectable solution  -- 3 unit(s) injectable 3 times a day (with meals) hold if FS less than 120  -- Check with your doctor before becoming pregnant.  Do not drink alcoholic beverages when taking this medication.  Keep in refrigerator.  Do not freeze.  Obtain medical advice before taking any non-prescription drugs as some may affect the action of this medication.    -- Indication: For dm    metoclopramide 5 mg oral tablet  -- 1 tab(s) by mouth 3 times a day (before meals)  -- Indication: For gastroparesis    All Day Allergy 10 mg oral tablet  -- 1 tab(s) by mouth once a day   -- May cause drowsiness.  Alcohol may intensify this effect.  Use care when operating dangerous machinery.  Obtain medical advice before taking any non-prescription drugs as some may affect the action of this medication.    -- Indication: For Home medication    ProAir HFA 90 mcg/inh inhalation aerosol  -- 2 puff(s) inhaled 4 times a day   -- For inhalation only.  It is very important that you take or use this exactly as directed.  Do not skip doses or discontinue unless directed by your doctor.  Obtain medical advice before taking any non-prescription drugs as some may affect the action of this medication.  Shake well before use.    -- Indication: For Home medication    Flonase 50 mcg/inh nasal spray  -- 1 spray(s) intranasally 3 times a day   -- For the nose.  It is very important that you take or use this exactly as directed.  Do not skip doses or discontinue unless directed by your doctor.    -- Indication: For Home medication    Protonix 40 mg oral delayed release tablet  -- 1 tab(s) by mouth once a day   -- It is very important that you take or use this exactly as directed.  Do not skip doses or discontinue unless directed by your doctor.  Obtain medical advice before taking any non-prescription drugs as some may affect the action of this medication.  Swallow whole.  Do not crush.    -- Indication: For Epigastric pain

## 2018-10-14 NOTE — DISCHARGE NOTE ADULT - CARE PROVIDER_API CALL
aLuryn Sanders (DO), Gastroenterology  [address] [place] [address]  Phone: [phone]  Fax: [phone]    Edu García), EndocrinologyMetabDiabetes; Internal Medicine  [address] [place] [address]  Phone: [phone]  Fax: [phone]

## 2018-10-16 LAB — SURGICAL PATHOLOGY FINAL REPORT - CH: SIGNIFICANT CHANGE UP

## 2018-11-27 ENCOUNTER — EMERGENCY (EMERGENCY)
Facility: HOSPITAL | Age: 18
LOS: 1 days | Discharge: HOME | End: 2018-11-27
Attending: EMERGENCY MEDICINE
Payer: MEDICAID

## 2018-11-27 VITALS
SYSTOLIC BLOOD PRESSURE: 123 MMHG | TEMPERATURE: 98 F | HEART RATE: 89 BPM | OXYGEN SATURATION: 99 % | DIASTOLIC BLOOD PRESSURE: 88 MMHG | RESPIRATION RATE: 18 BRPM

## 2018-11-27 VITALS
SYSTOLIC BLOOD PRESSURE: 130 MMHG | DIASTOLIC BLOOD PRESSURE: 90 MMHG | HEART RATE: 106 BPM | OXYGEN SATURATION: 100 % | WEIGHT: 115.08 LBS | TEMPERATURE: 98 F | RESPIRATION RATE: 20 BRPM | HEIGHT: 63 IN

## 2018-11-27 PROCEDURE — 71046 X-RAY EXAM CHEST 2 VIEWS: CPT

## 2018-11-27 PROCEDURE — 99283 EMERGENCY DEPT VISIT LOW MDM: CPT

## 2018-11-27 PROCEDURE — 71046 X-RAY EXAM CHEST 2 VIEWS: CPT | Mod: 26

## 2018-11-27 PROCEDURE — 82962 GLUCOSE BLOOD TEST: CPT

## 2018-11-27 RX ORDER — AZITHROMYCIN 500 MG/1
500 TABLET, FILM COATED ORAL ONCE
Qty: 0 | Refills: 0 | Status: COMPLETED | OUTPATIENT
Start: 2018-11-27 | End: 2018-11-27

## 2018-11-27 RX ORDER — IBUPROFEN 200 MG
600 TABLET ORAL ONCE
Qty: 0 | Refills: 0 | Status: COMPLETED | OUTPATIENT
Start: 2018-11-27 | End: 2018-11-27

## 2018-11-27 RX ORDER — AZITHROMYCIN 500 MG/1
1 TABLET, FILM COATED ORAL
Qty: 4 | Refills: 0 | OUTPATIENT
Start: 2018-11-27 | End: 2018-11-30

## 2018-11-27 RX ADMIN — Medication 600 MILLIGRAM(S): at 19:39

## 2018-11-27 RX ADMIN — AZITHROMYCIN 500 MILLIGRAM(S): 500 TABLET, FILM COATED ORAL at 19:37

## 2018-11-27 RX ADMIN — Medication 600 MILLIGRAM(S): at 18:13

## 2018-11-27 NOTE — ED PROVIDER NOTE - THROAT FINDINGS
Erythematous oropharynx without exudates. Swollen tonsils visualized. Bilateral tender cervical lymphadenopathy

## 2018-11-27 NOTE — ED STATDOCS - OBJECTIVE STATEMENT
Telemedicine assessment was conducted (using real time 2 way audio-video technology) by Dr. Joseluis Toussaint located at [address] [place] [address]  ++++++++++++++++++++++++  Pertinent patient history and initial plan: 19 y/o F pt with hx of DM and asthma presents to ED c/o sore throat, and productive cough with yellow phlegm that began 5 days ago. She also notes ear pain and fever this morning of 101.9 F. She took Ibuprofen with some relief, last dose was this morning.  Denies wheezing.

## 2018-11-27 NOTE — ED PROVIDER NOTE - RIGHT EAR
right TM normal. Has small specks of blood in external canal, minimal tenderness on exam with otoscope. Left ear with normal TM./TM clear

## 2018-11-27 NOTE — ED PROVIDER NOTE - MEDICAL DECISION MAKING DETAILS
3/4 centor criteria. High risk for poor follow-up if positive. Blood glucose 320. Will treat prophylactically for strep throat with azithromycin. Recommended to follow-up with Dr. shaffer within 48 hours and drink plenty of fluids. Also recommended stricter adherence to diabetes care plan and recommended follow-up with Dr. Hendrix, her endocrinologist within 1 week.

## 2018-11-27 NOTE — ED PROVIDER NOTE - OBJECTIVE STATEMENT
19 y/o F, PMHx DM1 and astham presents with sore throat. Ongoing for 4 days, gradually worsening. Now with severe pain, worsened upon swallowing. Reports she has been eating less because of this. Had fever at home 101.6F and took ibuprofen which lessened the symptoms. 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 Last week she had a cough productive for yellow sputum and rhinorrhea but those symptoms resolved. Now she is without a cough. NO known sick contacts. Reports she sometimes forgets taking her insulin.

## 2018-11-27 NOTE — ED PROVIDER NOTE - ATTENDING CONTRIBUTION TO CARE
Pt. with mild erythema to post. pharynx. NO exudates. TM clear b/l. Tenderness to b/l cervical adenopathy.  I have discussed the plan with the resident.

## 2018-11-27 NOTE — ED PROVIDER NOTE - CARE PROVIDER_API CALL
Taiwo Bennett), Internal Medicine  [address] [place] [address]  Phone: [phone]  Fax: [phone]

## 2018-12-16 ENCOUNTER — EMERGENCY (EMERGENCY)
Facility: HOSPITAL | Age: 18
LOS: 1 days | Discharge: HOME | End: 2018-12-16
Attending: STUDENT IN AN ORGANIZED HEALTH CARE EDUCATION/TRAINING PROGRAM
Payer: MEDICAID

## 2018-12-16 VITALS
WEIGHT: 119.93 LBS | HEIGHT: 63 IN | OXYGEN SATURATION: 100 % | DIASTOLIC BLOOD PRESSURE: 79 MMHG | SYSTOLIC BLOOD PRESSURE: 122 MMHG | TEMPERATURE: 98 F | HEART RATE: 117 BPM | RESPIRATION RATE: 18 BRPM

## 2018-12-16 PROCEDURE — 99284 EMERGENCY DEPT VISIT MOD MDM: CPT

## 2018-12-16 PROCEDURE — 73110 X-RAY EXAM OF WRIST: CPT

## 2018-12-16 PROCEDURE — 73130 X-RAY EXAM OF HAND: CPT

## 2018-12-16 PROCEDURE — 99283 EMERGENCY DEPT VISIT LOW MDM: CPT | Mod: 25

## 2018-12-17 VITALS
RESPIRATION RATE: 18 BRPM | SYSTOLIC BLOOD PRESSURE: 122 MMHG | TEMPERATURE: 98 F | OXYGEN SATURATION: 100 % | HEART RATE: 86 BPM | DIASTOLIC BLOOD PRESSURE: 74 MMHG

## 2018-12-17 PROCEDURE — 73110 X-RAY EXAM OF WRIST: CPT | Mod: 26,RT

## 2018-12-17 PROCEDURE — 73130 X-RAY EXAM OF HAND: CPT | Mod: 26,RT

## 2018-12-17 RX ORDER — IBUPROFEN 200 MG
1 TABLET ORAL
Qty: 20 | Refills: 0 | OUTPATIENT
Start: 2018-12-17 | End: 2018-12-21

## 2018-12-17 RX ORDER — ENOXAPARIN SODIUM 100 MG/ML
12 INJECTION SUBCUTANEOUS
Qty: 0 | Refills: 0 | COMMUNITY

## 2018-12-17 NOTE — ED PROVIDER NOTE - MEDICAL DECISION MAKING DETAILS
PT with stable VS, no acute distress, non toxic appearing, tolerating PO in the ED, PT jennifer intact, NO anatomical snuff box tenderness, PT will be placed in velcro splint, dc home with follow up to hand, educated about when to return to the ED if needed. PT verbalizes that he understands all instructions and results. Pt educated about the risks vs benefits of imaging at this time and agrees that not warranted for their symptoms, and PE

## 2018-12-17 NOTE — ED PROVIDER NOTE - OBJECTIVE STATEMENT
17 y/o F pt with PMHx DM and asthma presents to the ED c/o R hand pain s/p punching wall earlier today.  Pt was having a verbal altercation with a family member today when pt got frustrated and punched a wall.  She notes pain to her R hand immediately after the incident.  Pt took ibuprofen 600mg and has felt no relief.  Pt feels safe to go home, does not want to get police involved.  Denies numbness, tingling, fever, chills, N/V, CP, SOB.  No further acute complaints at this time.

## 2018-12-17 NOTE — ED PROVIDER NOTE - DURATION
[facility]  Pediatrics  [address] [place] [address]  Phone: [phone]  Fax: [phone]   January 8, 2018     Patient: Yuri Garland   YOB: 2006   Date of Visit: 1/8/2018       To Whom it May Concern:    Yuri Garland was seen in my clinic on 1/8/2018. He may return to school on 1/9/18.    If you have any questions or concerns, please don't hesitate to call.    Sincerely,           Katherin Peoples MD      today

## 2018-12-17 NOTE — ED PROVIDER NOTE - MUSCULOSKELETAL, MLM
swelling and tenderness to R 4th and 5th CP joint, FROM. Moving all extremities spontaneously. swelling and tenderness to R 4th and 5th CP joint, FROM. Moving all extremities spontaneously. NO anatomical snuff box tenderness,

## 2018-12-17 NOTE — ED ADULT NURSE NOTE - OBJECTIVE STATEMENT
17yo female c/o 9/10 right hand pain after punching wall earlier today. right hand edematous, fingers warm and mobile, + distal pulses, cap refil < 3 sec, sensation intact.

## 2018-12-17 NOTE — ED ADULT NURSE NOTE - CHPI ED NUR SYMPTOMS NEG
no stiffness/no deformity/no difficulty bearing weight/no fever/no numbness/no abrasion/no back pain/no bruising/no weakness

## 2018-12-17 NOTE — ED ADULT NURSE NOTE - NSIMPLEMENTINTERV_GEN_ALL_ED
Implemented All Universal Safety Interventions:  [place] to call system. Call bell, personal items and telephone within reach. Instruct patient to call for assistance. Room bathroom lighting operational. Non-slip footwear when patient is off stretcher. Physically safe environment: no spills, clutter or unnecessary equipment. Stretcher in lowest position, wheels locked, appropriate side rails in place.

## 2018-12-17 NOTE — ED PROVIDER NOTE - ATTENDING CONTRIBUTION TO CARE
19 yo with hand pain secondary to blunt trauma I personally saw the patient with the PA, and completed the key components of the history and physical exam. I then discussed the management plan with the PA.

## 2018-12-17 NOTE — ED PROVIDER NOTE - NEUROLOGICAL, MLM
strength intact to R hand. A&Ox3. strength intact to R hand. A&Ox3. ulnar radial and medial nerves intact.

## 2019-03-26 ENCOUNTER — EMERGENCY (EMERGENCY)
Facility: HOSPITAL | Age: 19
LOS: 1 days | Discharge: HOME | End: 2019-03-26
Attending: EMERGENCY MEDICINE
Payer: MEDICAID

## 2019-03-26 VITALS
HEIGHT: 63 IN | OXYGEN SATURATION: 100 % | DIASTOLIC BLOOD PRESSURE: 92 MMHG | HEART RATE: 115 BPM | WEIGHT: 119.93 LBS | TEMPERATURE: 98 F | SYSTOLIC BLOOD PRESSURE: 136 MMHG | RESPIRATION RATE: 18 BRPM

## 2019-03-26 VITALS
DIASTOLIC BLOOD PRESSURE: 88 MMHG | SYSTOLIC BLOOD PRESSURE: 132 MMHG | RESPIRATION RATE: 18 BRPM | HEART RATE: 102 BPM | TEMPERATURE: 98 F | OXYGEN SATURATION: 100 %

## 2019-03-26 LAB — HCG UR QL: NEGATIVE — SIGNIFICANT CHANGE UP

## 2019-03-26 PROCEDURE — 99283 EMERGENCY DEPT VISIT LOW MDM: CPT

## 2019-03-26 PROCEDURE — 73030 X-RAY EXAM OF SHOULDER: CPT | Mod: 26,LT

## 2019-03-26 PROCEDURE — 81025 URINE PREGNANCY TEST: CPT

## 2019-03-26 PROCEDURE — 73030 X-RAY EXAM OF SHOULDER: CPT

## 2019-03-26 RX ORDER — METHOCARBAMOL 500 MG/1
1 TABLET, FILM COATED ORAL
Qty: 9 | Refills: 0 | OUTPATIENT
Start: 2019-03-26 | End: 2019-03-28

## 2019-03-26 RX ORDER — IBUPROFEN 200 MG
600 TABLET ORAL ONCE
Qty: 0 | Refills: 0 | Status: COMPLETED | OUTPATIENT
Start: 2019-03-26 | End: 2019-03-26

## 2019-03-26 RX ORDER — IBUPROFEN 200 MG
1 TABLET ORAL
Qty: 9 | Refills: 0 | OUTPATIENT
Start: 2019-03-26 | End: 2019-03-28

## 2019-03-26 RX ORDER — METHOCARBAMOL 500 MG/1
750 TABLET, FILM COATED ORAL ONCE
Qty: 0 | Refills: 0 | Status: COMPLETED | OUTPATIENT
Start: 2019-03-26 | End: 2019-03-26

## 2019-03-26 RX ADMIN — Medication 600 MILLIGRAM(S): at 19:07

## 2019-03-26 RX ADMIN — METHOCARBAMOL 750 MILLIGRAM(S): 500 TABLET, FILM COATED ORAL at 19:07

## 2019-03-26 NOTE — ED STATDOCS - OBJECTIVE STATEMENT
17 y/o F pt with hx of asthma, DM presents to ED c/o paraspinal back pain x5-4 days with acute onset of pain radiating up to her left shoulder this morning. Pt states she fell down the stairs 2 weeks ago but was asymptomatic at time. Generalized discomfort with movement. Pt has been taking Tylenol and Motrin over last few days for relief, last dose was last night. Pt lifts her 1 y/o regularly. Denies fever, chills, coughing, LOC, hitting her head, hitting her shoulder, CP, difficulty breathing, deformities 17 y/o F pt with hx of asthma, DM presents to ED c/o paraspinal back pain x5-4 days with acute onset of pain radiating up to her left shoulder this morning. Pt states she fell down the stairs 2 weeks ago but was asymptomatic at time. Generalized discomfort with movement. Pt has been taking Tylenol and Motrin over last few days for relief, last dose was last night. Pt lifts her 1 y/o regularly. 1 y/o currently sick with fever. Denies fever, chills, coughing, LOC, hitting her head, hitting her shoulder, CP, difficulty breathing, deformities, change in gait, neck pain.

## 2019-03-26 NOTE — ED STATDOCS - PROGRESS NOTE DETAILS
PA note: No acute findings on xray. Will treat msk pain with course of PO ibuprofen / robaxin. Pt given follow up information with Dr. Mejia.

## 2019-03-26 NOTE — ED ADULT TRIAGE NOTE - CHIEF COMPLAINT QUOTE
Patient arrived to ED today with c/o entire back pains and now today left shoulder pains.  Patient states she fell down some steps about a week ago.

## 2019-03-26 NOTE — ED STATDOCS - NS_ ATTENDINGSCRIBEDETAILS _ED_A_ED_FT
I, Ki Pelaez, performed the initial face to face bedside interview with this patient regarding history of present illness, review of symptoms and relevant past medical, social and family history.  I completed an independent physical examination.  I was the initial provider who evaluated this patient. I have signed out the follow up of any pending tests (i.e. labs, radiological studies) to the ACP.  I have communicated the patient’s plan of care and disposition with the ACP.  The history, relevant review of systems, past medical and surgical history, medical decision making, and physical examination was documented by the scribe in my presence and I attest to the accuracy of the documentation.

## 2019-03-26 NOTE — ED STATDOCS - MUSCULOSKELETAL, MLM
Able to move all extremities spontaneously. no midline tenderness C-spine, T-spine or L-spine, thoracic and lumbar paraspinal tenderness, Pain with ROM of left shoulder.

## 2019-03-29 ENCOUNTER — EMERGENCY (EMERGENCY)
Facility: HOSPITAL | Age: 19
LOS: 1 days | Discharge: HOME | End: 2019-03-29
Attending: EMERGENCY MEDICINE
Payer: MEDICAID

## 2019-03-29 VITALS
OXYGEN SATURATION: 100 % | WEIGHT: 119.93 LBS | DIASTOLIC BLOOD PRESSURE: 75 MMHG | TEMPERATURE: 99 F | RESPIRATION RATE: 20 BRPM | HEART RATE: 139 BPM | SYSTOLIC BLOOD PRESSURE: 110 MMHG | HEIGHT: 62 IN

## 2019-03-29 VITALS
DIASTOLIC BLOOD PRESSURE: 76 MMHG | SYSTOLIC BLOOD PRESSURE: 108 MMHG | HEART RATE: 96 BPM | OXYGEN SATURATION: 100 % | TEMPERATURE: 99 F | RESPIRATION RATE: 16 BRPM

## 2019-03-29 LAB
ALBUMIN SERPL ELPH-MCNC: 4.1 G/DL — SIGNIFICANT CHANGE UP (ref 3.3–5.2)
ALP SERPL-CCNC: 73 U/L — SIGNIFICANT CHANGE UP (ref 40–120)
ALT FLD-CCNC: 11 U/L — SIGNIFICANT CHANGE UP
AMPHET UR-MCNC: NEGATIVE — SIGNIFICANT CHANGE UP
ANION GAP SERPL CALC-SCNC: 14 MMOL/L — SIGNIFICANT CHANGE UP (ref 5–17)
APPEARANCE UR: ABNORMAL
AST SERPL-CCNC: 14 U/L — SIGNIFICANT CHANGE UP
BACTERIA # UR AUTO: ABNORMAL
BARBITURATES UR SCN-MCNC: NEGATIVE — SIGNIFICANT CHANGE UP
BASOPHILS # BLD AUTO: 0 K/UL — SIGNIFICANT CHANGE UP (ref 0–0.2)
BASOPHILS NFR BLD AUTO: 0.3 % — SIGNIFICANT CHANGE UP (ref 0–2)
BENZODIAZ UR-MCNC: NEGATIVE — SIGNIFICANT CHANGE UP
BILIRUB SERPL-MCNC: 0.3 MG/DL — LOW (ref 0.4–2)
BILIRUB UR-MCNC: NEGATIVE — SIGNIFICANT CHANGE UP
BUN SERPL-MCNC: 12 MG/DL — SIGNIFICANT CHANGE UP (ref 8–20)
CALCIUM SERPL-MCNC: 9.7 MG/DL — SIGNIFICANT CHANGE UP (ref 8.6–10.2)
CHLORIDE SERPL-SCNC: 107 MMOL/L — SIGNIFICANT CHANGE UP (ref 98–107)
CK SERPL-CCNC: 45 U/L — SIGNIFICANT CHANGE UP (ref 25–170)
CO2 SERPL-SCNC: 21 MMOL/L — LOW (ref 22–29)
COCAINE METAB.OTHER UR-MCNC: NEGATIVE — SIGNIFICANT CHANGE UP
COLOR SPEC: YELLOW — SIGNIFICANT CHANGE UP
CREAT SERPL-MCNC: 0.46 MG/DL — LOW (ref 0.5–1.3)
DIFF PNL FLD: ABNORMAL
EOSINOPHIL # BLD AUTO: 0 K/UL — SIGNIFICANT CHANGE UP (ref 0–0.5)
EOSINOPHIL NFR BLD AUTO: 0.7 % — SIGNIFICANT CHANGE UP (ref 0–6)
EPI CELLS # UR: ABNORMAL
GLUCOSE SERPL-MCNC: 46 MG/DL — CRITICAL LOW (ref 70–115)
GLUCOSE UR QL: NEGATIVE MG/DL — SIGNIFICANT CHANGE UP
HCG UR QL: NEGATIVE — SIGNIFICANT CHANGE UP
HCT VFR BLD CALC: 34.7 % — LOW (ref 37–47)
HGB BLD-MCNC: 11.6 G/DL — LOW (ref 12–16)
KETONES UR-MCNC: NEGATIVE — SIGNIFICANT CHANGE UP
LEUKOCYTE ESTERASE UR-ACNC: ABNORMAL
LYMPHOCYTES # BLD AUTO: 3.1 K/UL — SIGNIFICANT CHANGE UP (ref 1–4.8)
LYMPHOCYTES # BLD AUTO: 43 % — SIGNIFICANT CHANGE UP (ref 20–55)
MCHC RBC-ENTMCNC: 28.3 PG — SIGNIFICANT CHANGE UP (ref 27–31)
MCHC RBC-ENTMCNC: 33.4 G/DL — SIGNIFICANT CHANGE UP (ref 32–36)
MCV RBC AUTO: 84.6 FL — SIGNIFICANT CHANGE UP (ref 81–99)
METHADONE UR-MCNC: NEGATIVE — SIGNIFICANT CHANGE UP
MONOCYTES # BLD AUTO: 0.7 K/UL — SIGNIFICANT CHANGE UP (ref 0–0.8)
MONOCYTES NFR BLD AUTO: 9.4 % — SIGNIFICANT CHANGE UP (ref 3–10)
NEUTROPHILS # BLD AUTO: 3.3 K/UL — SIGNIFICANT CHANGE UP (ref 1.8–8)
NEUTROPHILS NFR BLD AUTO: 46.3 % — SIGNIFICANT CHANGE UP (ref 37–73)
NITRITE UR-MCNC: NEGATIVE — SIGNIFICANT CHANGE UP
OPIATES UR-MCNC: NEGATIVE — SIGNIFICANT CHANGE UP
PCP SPEC-MCNC: SIGNIFICANT CHANGE UP
PCP UR-MCNC: NEGATIVE — SIGNIFICANT CHANGE UP
PH UR: 6.5 — SIGNIFICANT CHANGE UP (ref 5–8)
PLATELET # BLD AUTO: 458 K/UL — HIGH (ref 150–400)
POTASSIUM SERPL-MCNC: 3.3 MMOL/L — LOW (ref 3.5–5.3)
POTASSIUM SERPL-SCNC: 3.3 MMOL/L — LOW (ref 3.5–5.3)
PROT SERPL-MCNC: 7.8 G/DL — SIGNIFICANT CHANGE UP (ref 6.6–8.7)
PROT UR-MCNC: 30 MG/DL
RBC # BLD: 4.1 M/UL — LOW (ref 4.4–5.2)
RBC # FLD: 12.8 % — SIGNIFICANT CHANGE UP (ref 11–15.6)
RBC CASTS # UR COMP ASSIST: ABNORMAL /HPF (ref 0–4)
SODIUM SERPL-SCNC: 142 MMOL/L — SIGNIFICANT CHANGE UP (ref 135–145)
SP GR SPEC: 1 — LOW (ref 1.01–1.02)
THC UR QL: NEGATIVE — SIGNIFICANT CHANGE UP
TROPONIN T SERPL-MCNC: <0.01 NG/ML — SIGNIFICANT CHANGE UP (ref 0–0.06)
UROBILINOGEN FLD QL: NEGATIVE MG/DL — SIGNIFICANT CHANGE UP
WBC # BLD: 7.2 K/UL — SIGNIFICANT CHANGE UP (ref 4.8–10.8)
WBC # FLD AUTO: 7.2 K/UL — SIGNIFICANT CHANGE UP (ref 4.8–10.8)
WBC UR QL: SIGNIFICANT CHANGE UP

## 2019-03-29 PROCEDURE — 81001 URINALYSIS AUTO W/SCOPE: CPT

## 2019-03-29 PROCEDURE — 80053 COMPREHEN METABOLIC PANEL: CPT

## 2019-03-29 PROCEDURE — 82962 GLUCOSE BLOOD TEST: CPT

## 2019-03-29 PROCEDURE — 93010 ELECTROCARDIOGRAM REPORT: CPT

## 2019-03-29 PROCEDURE — 80307 DRUG TEST PRSMV CHEM ANLYZR: CPT

## 2019-03-29 PROCEDURE — 36415 COLL VENOUS BLD VENIPUNCTURE: CPT

## 2019-03-29 PROCEDURE — 81025 URINE PREGNANCY TEST: CPT

## 2019-03-29 PROCEDURE — 74177 CT ABD & PELVIS W/CONTRAST: CPT | Mod: 26

## 2019-03-29 PROCEDURE — 93005 ELECTROCARDIOGRAM TRACING: CPT

## 2019-03-29 PROCEDURE — 74177 CT ABD & PELVIS W/CONTRAST: CPT

## 2019-03-29 PROCEDURE — 99284 EMERGENCY DEPT VISIT MOD MDM: CPT | Mod: 25

## 2019-03-29 PROCEDURE — 99284 EMERGENCY DEPT VISIT MOD MDM: CPT

## 2019-03-29 PROCEDURE — 82550 ASSAY OF CK (CPK): CPT

## 2019-03-29 PROCEDURE — 96374 THER/PROPH/DIAG INJ IV PUSH: CPT | Mod: XU

## 2019-03-29 PROCEDURE — 85027 COMPLETE CBC AUTOMATED: CPT

## 2019-03-29 PROCEDURE — 84484 ASSAY OF TROPONIN QUANT: CPT

## 2019-03-29 RX ORDER — KETOROLAC TROMETHAMINE 30 MG/ML
30 SYRINGE (ML) INJECTION ONCE
Qty: 0 | Refills: 0 | Status: DISCONTINUED | OUTPATIENT
Start: 2019-03-29 | End: 2019-03-29

## 2019-03-29 RX ORDER — SODIUM CHLORIDE 9 MG/ML
999 INJECTION INTRAMUSCULAR; INTRAVENOUS; SUBCUTANEOUS ONCE
Qty: 0 | Refills: 0 | Status: COMPLETED | OUTPATIENT
Start: 2019-03-29 | End: 2019-03-29

## 2019-03-29 RX ORDER — LIDOCAINE 4 G/100G
1 CREAM TOPICAL
Qty: 10 | Refills: 0 | OUTPATIENT
Start: 2019-03-29 | End: 2019-04-07

## 2019-03-29 RX ORDER — METHOCARBAMOL 500 MG/1
1000 TABLET, FILM COATED ORAL ONCE
Qty: 0 | Refills: 0 | Status: DISCONTINUED | OUTPATIENT
Start: 2019-03-29 | End: 2019-04-03

## 2019-03-29 RX ORDER — SODIUM CHLORIDE 9 MG/ML
1000 INJECTION, SOLUTION INTRAVENOUS
Qty: 0 | Refills: 0 | Status: DISCONTINUED | OUTPATIENT
Start: 2019-03-29 | End: 2019-04-03

## 2019-03-29 RX ORDER — CYCLOBENZAPRINE HYDROCHLORIDE 10 MG/1
1 TABLET, FILM COATED ORAL
Qty: 9 | Refills: 0 | OUTPATIENT
Start: 2019-03-29 | End: 2019-03-31

## 2019-03-29 RX ORDER — LIDOCAINE 4 G/100G
1 CREAM TOPICAL ONCE
Qty: 0 | Refills: 0 | Status: DISCONTINUED | OUTPATIENT
Start: 2019-03-29 | End: 2019-04-03

## 2019-03-29 RX ADMIN — SODIUM CHLORIDE 999 MILLILITER(S): 9 INJECTION INTRAMUSCULAR; INTRAVENOUS; SUBCUTANEOUS at 00:42

## 2019-03-29 RX ADMIN — Medication 30 MILLIGRAM(S): at 05:05

## 2019-03-29 RX ADMIN — SODIUM CHLORIDE 999 MILLILITER(S): 9 INJECTION INTRAMUSCULAR; INTRAVENOUS; SUBCUTANEOUS at 03:02

## 2019-03-29 RX ADMIN — SODIUM CHLORIDE 200 MILLILITER(S): 9 INJECTION, SOLUTION INTRAVENOUS at 03:45

## 2019-03-29 NOTE — ED PROVIDER NOTE - CARE PLAN
Principal Discharge DX:	Left-sided back pain, unspecified back location, unspecified chronicity  Secondary Diagnosis:	Hyperglycemia

## 2019-03-29 NOTE — ED PROVIDER NOTE - OBJECTIVE STATEMENT
Source: Patient  17 y/o F, with hx of asthma and DM, presents to the ED c/o diffuse back pain.  Pain radiates up bilateral shoulders and around to her chest.  Pain is worse with movement.  Pt states that she was seen in the ED 2 days ago for similar back pain and was told pain is likely muscular.  However, pt states that pain has worsened since her last visit.  Associated sx include chills and decreased appetite secondary to pain.  Notes that she can tell when her blood sugar drops because she develops a headache.  States that she fell a few weeks ago and pain did not start immediately after fall.  Pt states shortly after dinner at 8:00PM, her blood sugar was 200+ and she administered 3.5 units of insulin.  Blood sugar at baseline is 112, last sugar was 43 in the ED.  Pt states that she has not eaten much today.  Last Hemoglobin A1c was 12.  Notes hx of DKA, last time was 10/2018.  Notes hx of UTI.   Pt states that she was diagnosed with DM at age 4, however has only been using an insulin pump since 2/2019.  No fever, cough, SOB, abd pain, N/V/D, dysuria, hematuria, or bladder/bowel dysfunction.

## 2019-03-29 NOTE — ED ADULT NURSE REASSESSMENT NOTE - NS ED NURSE REASSESS COMMENT FT1
pt a&ox3, pt states prefers to use own glucometer. dr beebe aware. . pt ambulated steadily to BR independently. voiding effortlessly. iv fluids infusing. mother called nursing agitated and yelling "you people are doing nothing for my daughter shes been there for hours stop addressing her diabetes that is not what she is there for" plan discussed with patient at length, verbalized understanding. call bell in reach pt a&ox3, pt states prefers to use own glucometer. dr beebe aware. . pt ambulated steadily to BR independently. voiding effortlessly. iv fluids infusing. mother called nursing agitated and yelling "you people are doing nothing for my daughter shes been there for hours stop addressing her diabetes that is not what she is there for" pt states she would like her mother to receive info over telephone. dr beebe at bedside with patient- pt now requesting pain meds. plan discussed with patient at length, verbalized understanding. call bell in reach

## 2019-03-29 NOTE — ED ADULT TRIAGE NOTE - CHIEF COMPLAINT QUOTE
Pt with c/o reproducible chest pain, back pain, neck pain s/p fall a month ago. Pt states the pain is getting worse and started traveling down her left arm tonight. Pt presents with rx of Robaxin that she was prescribed here at [facility] recently states the meds aren't working.

## 2019-03-29 NOTE — ED ADULT NURSE NOTE - CHIEF COMPLAINT QUOTE
Pt with c/o reproducible chest pain, back pain, neck pain s/p fall a month ago. Pt states the pain is getting worse and started traveling down her left arm tonight. Pt presents with rx of Robaxin that she was prescribed here at [facility] recently states the meds aren't working.

## 2019-03-29 NOTE — ED PROVIDER NOTE - NS ED ROS FT
no weight change, no fever, +chills  no recent travel, no recent abox, no sick contacts  no recent change in medications  no rash, no bruises  no visual changes no eye discharge  no cough cold or congestion,   no sob, + chest pain  no orthopnea, no pnd  no abd pain, no n/v/d  no hematuria, no change in urinary habits  +back pain, no joint pain, no deformity  + headache, no paresthesia no weight change, no fever, +chills  no recent travel, no recent abox, no sick contacts  no recent change in medications  no rash, no bruises  no visual changes no eye discharge  no cough cold or congestion,   no sob, no chest pain  no orthopnea, no pnd  no abd pain, no n/v/d  no hematuria, no change in urinary habits  +back pain, no joint pain, no deformity  + headache, no paresthesia no weight change, no fever, +chills, +decreased appetite  no recent travel, no recent abox, no sick contacts  no recent change in medications  no rash, no bruises  no visual changes no eye discharge  no cough cold or congestion,   no sob, no chest pain  no orthopnea, no pnd  no abd pain, no n/v/d  no hematuria, no change in urinary habits  +back pain, no joint pain, no deformity  + headache, no paresthesia

## 2019-03-29 NOTE — ED PROVIDER NOTE - PHYSICAL EXAMINATION
Constitutional : Appears comfortably, decreased eye contact, talking in full sentences  Head :NC AT , no swelling  Eyes :eomi, no swelling  Mouth :mm dry,  Neck : supple, trachea in midline  Chest :Sarthak air entry, symm chest expansion, no distress  Heart :S1 S2 distant  Abdomen :abd soft, non tender, pump to anterior LLQ, no surrounding erythema or tenderness  Musc/Skel :ext no swelling, no deformity, no spine tenderness, distal pulses present, pointing to left posterior iliac region for pain  Neuro  :AAO 3 no focal deficits

## 2019-03-29 NOTE — ED PROVIDER NOTE - CLINICAL SUMMARY MEDICAL DECISION MAKING FREE TEXT BOX
19 y/o F, with hx of DM, 17 y/o F, with hx of IDDM, hemoglobin A1c is 12, presents with acute on chronic iliac pain, low sugar, plan to do labs, CT abd/pelvis, and reeval

## 2019-03-29 NOTE — ED PROVIDER NOTE - PROGRESS NOTE DETAILS
patients mother called she was very rude to staff, demanding pian meds, I spoke to patient, she di dnot ask us for any pain meds. patient seemed depressed

## 2019-03-29 NOTE — ED ADULT NURSE NOTE - OBJECTIVE STATEMENT
19yo female c/o upper back pain, chest pain and left arm pain x ~2 weeks. 17yo female c/o upper back pain, chest pain and left arm pain x ~2 weeks. pt states she has been here for similar symptoms and was given robaxin but had no relief. pt denies any chest pain, fevers, chills, dysuria, numbness/tingling, sob, incontinence. pt states she fell down steps "a few weeks ago" "but that's not what the problem is" LEACH x 4, PERRL. pt has insulin pump and type 1 DM

## 2019-08-21 NOTE — ED PROVIDER NOTE - GASTROINTESTINAL [+], MLM
ABDOMINAL PAIN/VOMITING ROS  Constitutional:  See HPI.  Eyes:  No visual changes, eye pain or discharge.  ENMT:  No hearing changes, pain, discharge or infections. No neck pain or stiffness.  Cardiac:  No chest pain, SOB or edema. No chest pain with exertion.  Respiratory:  No cough or respiratory distress. No hemoptysis.  GI:  No nausea, vomiting, diarrhea or abdominal pain.    MS:  No myalgia, muscle weakness, joint pain or back pain.  Neuro:  No focal neurological complaints.  Skin:  No skin rash.  Except as documented in the HPI,  all other systems are negative.

## 2020-06-20 NOTE — ED PROVIDER NOTE - ENMT NEGATIVE STATEMENT, MLM
Universal Safety Interventions Ears: no ear pain and no hearing problems.Nose: no nasal congestion and no nasal drainage.Mouth/Throat: no dysphagia, no hoarseness and no throat pain.Neck: no lumps, no pain, no stiffness and no swollen glands.

## 2020-09-21 NOTE — PATIENT PROFILE ADULT. - PAIN CHRONIC, PROFILE
Please call pt to let him know he has an unread message in [employer]:    Your prolactin level is now appropriately low at 0.6 with starting the cabergoline so please keep taking 1/2 tab 2x/week.  Unfortunately, lowering this level has not caused your testosterone to rise and your level is still very low at 54 and down from 92 in June.  I would like to try a pill called clomid to try and stimulate your pituitary gland to make testosterone.  You will take one 50 mg tab at bedtime every night.  This is not covered by insurance as it is an off label use to try and raise your testosterone.  Please go to [employer] and type in clomiphene for the quantity of 90 and look to see which pharmacy is closest to you so that you can get this for the cheapest price paying cash.  You can either download the daniel and show the coupon to the pharmacist or you can print off a coupon to take to the pharmacy. Let me know where you want this sent and I'll take care of this. Inez Malagon would like to do this for 2 months and see the effect on your testosterone level.  I will scan and e-mail you another lab slip to repeat your labs at that time. no

## 2020-10-01 NOTE — ED CLERICAL - DIVISION
What Type Of Note Output Would You Prefer (Optional)?: Bullet Format How Severe Is Your Skin Lesion?: mild [facility]... Has Your Skin Lesion Been Treated?: not been treated Is This A New Presentation, Or A Follow-Up?: Skin Lesion

## 2020-10-12 NOTE — ED PROVIDER NOTE - DATE/TIME 1
Janet can you please add IGG Sars for patient, she was here today for blood draw and wanted to add that to orders as well. Order will be going out today for collection. Thank you!  
Please notify the patient of normal results.  No sign of previous Covid infection  Follow-up as planned.
07-Aug-2017 05:38

## 2020-12-01 NOTE — ED ADULT NURSE NOTE - MUSCULOSKELETAL WDL
Full range of motion of upper and lower extremities, no joint tenderness/swelling.
PROVIDER:[TOKEN:[[id number]],FOLLOWUP:[1 week]]

## 2021-02-02 NOTE — ED ADULT TRIAGE NOTE - BANDS:
[address]   Progress Note and Procedure Note      Kyle Dunn  MEDICAL RECORD NUMBER:  [id number]  AGE: 46 y.o. GENDER: male  : 1969  EPISODE DATE:  2021    Subjective:     Chief Complaint   Patient presents with    Wound Check     Pt denies any concerns         HISTORY of PRESENT ILLNESS HPI     Kyle Dunn is a 46 y.o. male who presents today for wound/ulcer evaluation. Wound Context: Pt with R foot wound here for eval/treat  Wound/Ulcer Pain Timing/Severity: none  Quality of pain: N/A  Severity:  0 / 10   Modifying Factors: None  Associated Signs/Symptoms: none    Ulcer Identification:  Ulcer Type: diabetic  Contributing Factors: diabetes    Wound: DM        PAST MEDICAL HISTORY        Diagnosis Date    Anxiety     Diabetes ([facility]     Schizophrenic disorder ([facility]        PAST SURGICAL HISTORY    Past Surgical History:   Procedure Laterality Date    TOE AMPUTATION Left 2020    4TH AND 5TH OPEN TOE AMPUTATION performed by Russ Cordoba MD at [address] [address]    History reviewed. No pertinent family history.     SOCIAL HISTORY    Social History     Tobacco Use    Smoking status: Former Smoker     Packs/day: 0.00     Types: Cigars    Smokeless tobacco: Never Used    Tobacco comment: Had to quit can not smoke, can not go outside   Substance Use Topics    Alcohol use: Not Currently    Drug use: Never       ALLERGIES    Allergies   Allergen Reactions    Clozaril [Clozapine]     Haldol [Haloperidol]     Lithium     Navane [Thiothixene]        MEDICATIONS    Current Outpatient Medications on File Prior to Encounter   Medication Sig Dispense Refill    CHOLECALCIFEROL PO Take 1,000 Units by mouth daily      famotidine (PEPCID) 20 MG tablet Take 20 mg by mouth daily      risperiDONE (RISPERDAL) 2 MG tablet Take 2 mg by mouth daily      FLUoxetine (PROZAC) 40 MG capsule Take 40 mg by mouth nightly      OLANZapine zydis (ZYPREXA) 20 MG disintegrating tablet Take 20 mg by mouth nightly      insulin glargine (BASAGLAR KWIKPEN) 100 UNIT/ML injection pen Inject 100 Units into the skin nightly       risperiDONE (RISPERDAL) 4 MG tablet Take 4 mg by mouth nightly      simvastatin (ZOCOR) 20 MG tablet Take 20 mg by mouth nightly      polyethylene glycol (MIRALAX) 17 g PACK packet Take 17 g by mouth daily as needed      acetaminophen (TYLENOL) 650 MG suppository Place 650 mg rectally every 6 hours as needed for Fever or Pain      acetaminophen (TYLENOL) 325 MG tablet Take 650 mg by mouth every 4 hours as needed for Pain      ibuprofen (ADVIL;MOTRIN) 400 MG tablet Take 400 mg by mouth every 6 hours as needed for Pain or Fever      loperamide (IMODIUM) 2 MG capsule Take 2 mg by mouth 4 times daily as needed for Diarrhea      hydrOXYzine (ATARAX) 25 MG tablet Take 25 mg by mouth 3 times daily as needed for Itching       No current facility-administered medications on file prior to encounter. REVIEW OF SYSTEMS    A comprehensive review of systems was negative.     Objective:      /79   Pulse 86   Temp 97.8 °F (36.6 °C)   Resp 18   Ht 6' 3\" (1.905 m)   Wt 229 lb 9.6 oz (104.1 kg)   BMI 28.70 kg/m²     Wt Readings from Last 3 Encounters:   02/02/21 229 lb 9.6 oz (104.1 kg)   01/28/21 229 lb (103.9 kg)   10/21/20 229 lb (103.9 kg)       PHYSICAL EXAM    General Appearance: alert and oriented to person, place and time, well developed and well- nourished, in no acute distress  Skin: warm and dry, no rash or erythema  Head: normocephalic and atraumatic  Eyes: pupils equal, round, and reactive to light, extraocular eye movements intact, conjunctivae normal  ENT: tympanic membrane, external ear and ear canal normal bilaterally, nose without deformity, nasal mucosa and turbinates normal without polyps, lips teeth and gums normal  Neck: supple and non-tender without mass, no thyromegaly or thyroid nodules, no cervical lymphadenopathy  Pulmonary/Chest: clear exposed           Post Debridement Measurements:    Wound/Ulcer Descriptions are Pre Debridement --EXCEPT MEASUREMENTS    Wound 07/17/20 Foot Plantar;Right Wound 2, Diabetic Lan 1, R. 1st Metatarsal Pad (Active)   Wound Image    02/02/21 1009   Wound Etiology Diabetic Lan 1 02/02/21 1009   Dressing Status Old drainage noted 02/02/21 1009   Wound Cleansed Soap and water 02/02/21 1009   Dressing/Treatment Xeroform 01/28/21 0947   Wound Length (cm) 0.5 cm 02/02/21 1009   Wound Width (cm) 0.2 cm 02/02/21 1009   Wound Depth (cm) 0.1 cm 02/02/21 1009   Wound Surface Area (cm^2) 0.1 cm^2 02/02/21 1009   Change in Wound Size % (l*w) 72.22 02/02/21 1009   Wound Volume (cm^3) 0.01 cm^3 02/02/21 1009   Wound Healing % 75 02/02/21 1009   Post-Procedure Length (cm) 0.5 cm 02/02/21 1058   Post-Procedure Width (cm) 0.2 cm 02/02/21 1058   Post-Procedure Depth (cm) 0.1 cm 02/02/21 1058   Post-Procedure Surface Area (cm^2) 0.1 cm^2 02/02/21 1058   Post-Procedure Volume (cm^3) 0.01 cm^3 02/02/21 1058   Distance Tunneling (cm) 0 cm 02/02/21 1009   Tunneling Position ___ O'Clock 0 02/02/21 1009   Undermining Starts ___ O'Clock 0 02/02/21 1009   Undermining Ends___ O'Clock 0 02/02/21 1009   Undermining Maxium Distance (cm) 0 02/02/21 1009   Wound Assessment Pink/red 02/02/21 1009   Drainage Amount Small 02/02/21 1009   Drainage Description Serosanguinous 02/02/21 1009   Odor None 02/02/21 1009   Corine-wound Assessment Hyperkeratosis (callous) 02/02/21 1009   Margins Attached edges 02/02/21 1009   Wound Thickness Description not for Pressure Injury Not applicable 80/76/25 1129   Number of days: 199             Estimated Blood Loss:  Minimal    Hemostasis Achieved:  by pressure    Procedural Pain:  0  / 10     Post Procedural Pain:  0 / 10     Response to treatment:  Well tolerated by patient.          Plan:     Problem List Items Addressed This Visit     * (Principal) Right foot ulcer, with fat layer exposed ([address] (Chronic) Relevant Orders    Supply: Wound Cleanser    Ulcer of toe of left foot, with fat layer exposed ([facility] (Chronic)    PVD (peripheral vascular disease) ([facility] [address] [facility] - Primary    Relevant Orders    Supply: Wound Cleanser    Cigarette nicotine dependence with nicotine-induced disorder    Relevant Orders    Supply: Wound Cleanser          Treatment Note please see attached Discharge Instructions    In my professional opinion this patient would benefit from HBO Therapy: No    Written patient dismissal instructions given to patient and signed by patient or POA. Discharge [address] and Hyperbaric Oxygen Therapy   Physician Orders and Discharge Instructions  [address] [facility] [place]  Sharon Alejandra  Telephone: [phone]    NAME:  Velvet Alvarez:  1969  MEDICAL RECORD NUMBER:  [id number]  DATE:  2/2/2021    Discharge condition: Stable    Discharge to: Home    Left via:Private automobile    Accompanied by: Self     ECF/HHA: [facility] . .. [phone] p  [phone] f    Dressing orders    Left 3rd toe:  Wash with soap and water. Apply Xeroform to incision. Cover with gauze. Secure with roll gauze and medipore tape. Change daily . Wear off load shoe to Left foot    Right Foot:  Wash right foot wounds with soap and water, rinse well, pat dry. Apply Xeroform to open wounds, dry gauze, secure with roll gauze. Change daily. Metatarsal pad with cutout for the wound  Main mode of transportation should be wheelchair. Treatment Orders  Protein rich diet (unless restricted by your physician)  Multivitamin daily  Elevate legs when sitting    [address] [place] [address],[address] follow up visit _____________1 week_______________  (Please note your next appointment above and if you are unable to keep, kindly give a 24 hour notice.  Thank you.)    If you experience any of the following, please call the [address] [place] [address] [address] during business hours:    * Allergy; Increase in Pain  * Temperature over 101  * Increase in drainage from your wound  * Drainage with a foul odor  * Bleeding  * Increase in swelling  * Need for compression bandage changes due to slippage, breakthrough drainage. If you need medical attention outside of the business hours of the [address] [place] [address] Road please contact your PCP or go to the nearest emergency room.         Electronically signed by Rosalino Erickson MD on 2/2/2021 at 1:34 PM

## 2021-03-31 NOTE — DIETITIAN INITIAL EVALUATION ADULT. - ETIOLOGY
Patient/Caregiver provided printed discharge information. not fully ready for diet/lifestyle changes, some food & nutrition related knowledge deficit

## 2021-06-13 NOTE — ED PROVIDER NOTE - RELIEVING FACTORS
FEMALE ADULT PREVENTIVE EXAM    CHIEF COMPLAINT:  Female preventive exam.    SUBJECTIVE:  Delma Bobo is a 37 y.o. female who presents for her routine physical exam.    Patient would like to address the following concerns today:     Kids are in .  Both patient and  are working from home.      Rash on forehead:  Herbal Essence oil free, but ran out and tried a new shampoo, will be starting it again.  Not great about sunscreen, maybe sprayed her forehead once and did not get it well.  It now has a reddish hyperpigmentation.  Seen by derm and has tried antifungal and steroid without improvement.  Discussed lotions with spf and skin evening effect.    Irregular menses:  Mother had menopause in early 40s.  Feels like her symptoms are more like around puberty.  More premenstrual symptoms.    Shoulder pain and back pain due to meeting heavy workload.  Exercising more, and left shoulder is hurting more.  Pain overlying the deltoid with jumping jacks.  Hurts to sleep on side.      GYNE HISTORY  Menses: irregular spotting and one that was late while on OCP.  Going to acupuncture, first three cycles were good, then got it after 2 weeks.    Sexually Active: with .    Contraception: OCP helped some with cycle.   got vasectomy.   Last Pap: 2019 - normal and negative HPV.    Abnormal Pap:  no  Vaginal Discharge: none      She  has a past medical history of Abnormal Pap smear of cervix, Allergic, and Varicella.    Lab Results   Component Value Date    WBC 8.2 06/16/2017    HGB 12.5 10/13/2017    HCT 35.8 06/16/2017    MCV 91 06/16/2017     08/30/2017     Lab Results   Component Value Date    CHOL 263 (H) 12/30/2019    HDL 46 (L) 12/30/2019    LDLCALC 139 (H) 12/30/2019    TRIG 389 (H) 12/30/2019     Lab Results   Component Value Date    TSH 2.46 11/18/2020     BP Readings from Last 3 Encounters:   11/18/20 114/70   12/12/19 130/80   10/02/19 110/62       Surgeries:    Past Surgical  History:   Procedure Laterality Date      SECTION N/A 2017    Procedure:  SECTION;  Surgeon: Omero Purdy MD;  Location: [facility];  Service:      MARLEE INCISE FINGER TENDON SHEATH      Description: Hand Incision Tendon Sheath Of A Finger;  Recorded: 2011;  Comments: 2nd grade       Family History:  Her family history includes Breast cancer in her maternal grandfather and maternal grandmother; Cancer in her maternal grandmother; Diabetes in her father and maternal aunt; Diabetes (age of onset: 60) in her paternal aunt; Early death in her father; Heart disease (age of onset: 58) in her father.    Social History:  She  reports that she has quit smoking. She smoked 0.00 packs per day. She has never used smokeless tobacco. She reports that she does not drink alcohol or use drugs.    Medications:    Current Outpatient Medications:      cholecalciferol, vitamin D3, 2,000 unit cap, Take 1 capsule by mouth once daily., Disp: , Rfl:      ketoconazole (NIZORAL) 2 % cream, APPLY TWICE A DAY TOPICALLY TO THE FOREHEAD., Disp: , Rfl:      MAGNESIUM GLYCINATE ORAL, Take 260 mg by mouth., Disp: , Rfl:      mv-min/iron/folic/calcium/vitK (WOMEN'S MULTIVITAMIN ORAL), Take by mouth., Disp: , Rfl:      omega-3/dha/epa/fish oil (FISH OIL-OMEGA-3 FATTY ACIDS) 300-1,000 mg capsule, Take 2 g by mouth daily., Disp: , Rfl:      FLUCELVAX QUAD 8892-1921, PF, 60 mcg (15 mcg x 4)/0.5 mL Syrg, PHARMACY ADMINISTERED, Disp: , Rfl:      JUNEL 1., , 1.5-30 mg-mcg per tablet, TAKE 1 TABLET BY MOUTH EVERY DAY, Disp: 21 tablet, Rfl: 8      Allergies:  No latex allergies.  Allergies   Allergen Reactions     Gluten      Digestion, hand eczema, constipation          RISK BEHAVIOR & HEALTH HABITS  Self Breast Exam: inconsistently  Regular Exercise: reviewed.  Balanced diet: taking fish oil  Seat Belt Use: yes  Calcium intake/Osteoporosis prevention: discussed.    Guns: NO  Sun Screen: YES  Dental Care:  YES    REVIEW OF SYSTEMS:  Complete head to toe review of systems is otherwise negative except as above.    OBJECTIVE:  VITAL SIGNS:    Vitals:    11/18/20 0909   BP: 114/70   Pulse: 65   Temp: 98.1  F (36.7  C)   SpO2: 99%     GENERAL:  Patient alert, in no acute distress.  EYES: PERRLA.   ENT:  Hearing grossly normal.  Normal appearance to ears and nose.  Bilateral TM s, external canals, oropharynx normal. Normal lips, gums and teeth.  Normal nasal mucosa, septum and turbinates.  NECK:  Supple, without thyromegaly or mass.  RESP:  Clear to auscultation without crackles, wheezes or distress.  Normal respiratory effort.   CV:  Regular rate and rhythm without murmurs, rubs or gallops.    ABDOMEN:  Soft, non-tender, without hepatosplenomegaly, masses, or hernias.   BREASTS:  Nontender, without masses, nipple discharge, erythema, or axillary adenopathy.    :    External genitalia:  Normal without lesions.  Urethra: Normal appearing  Vagina: Normal without discharge  Cervix: not due.  Uterus:  Nontender, mobile, without masses  Ovaries: Normal without masses  LYMPHATIC: Normal palpation of neck and axilla..  No lymphadenopathy.  No bruising.  NEURO:  CN II-XII intact, motor & sensory function all intact.    PSYCHIATRIC:  Alert & oriented with normal mood and affect.  Good judgment and insight.  SKIN:  Normal inspection and palpation.  MUSCULOSKELETAL: Normal gait and station.    ASSESSMENT & PLAN  Delma was seen today for annual exam.    Diagnoses and all orders for this visit:    Routine general medical examination at a health care facility  Vaccines are up to date.  Reviewed family history for high risk screening.    Irregular menses  -     Thyroid Stimulating Hormone (TSH)  -     T4, Free  -     Prolactin  -     US Pelvis With Transvaginal Non OB; Future  Discussed hormonal vs. Structural abnormalities.   Patient is done having children as  got vasectomy.  Will check labs and ultrasound.  If negative,  can consider options for regulation.    Left shoulder pain, unspecified chronicity  -     Ambulatory referral to Adult PT- Internal    Mixed hyperlipidemia  -     Lipid [place] FASTING    Screening for diabetes mellitus  -     Glucose       none

## 2021-07-02 NOTE — ED ADULT NURSE NOTE - PAIN RATING/NUMBER SCALE (0-10): REST
6 Suturegard Body: The suture ends were repeatedly re-tightened and re-clamped to achieve the desired tissue expansion.

## 2021-08-25 NOTE — ED ADULT TRIAGE NOTE - MEANS OF ARRIVAL
stretcher Simponi Counseling:  I discussed with the patient the risks of golimumab including but not limited to myelosuppression, immunosuppression, autoimmune hepatitis, demyelinating diseases, lymphoma, and serious infections.  The patient understands that monitoring is required including a PPD at baseline and must alert us or the primary physician if symptoms of infection or other concerning signs are noted.

## 2021-09-27 NOTE — PATIENT PROFILE OB - TOBACCO USE
----- Message from Ellen Husk sent at 9/27/2021  3:19 PM EDT -----  Subject: Referral Request    QUESTIONS   Reason for referral request? Patient would like a new referral to a   Endocrinology Specialist.   Has the physician seen you for this condition before? No   Preferred Specialist (if applicable)? Do you already have an appointment scheduled? No  Additional Information for Provider?   ---------------------------------------------------------------------------  --------------  CALL BACK INFO  What is the best way for the office to contact you? OK to leave message on   voicemail  Preferred Call Back Phone Number?  [phone] Never smoker

## 2022-03-09 NOTE — ED ADULT NURSE NOTE - NS ED NURSE TRANSPORT WITH
"Chief Complaint  URI (F/u  with hoarseness)    Subjective          Brian Garcia presents to [facility]  History of Present Illness  68 yr old male presenting for follow-up, he was seen last Thursday and treated for seasonal allergies and fluid bilateral ears, states ears feeling somewhat better but yesterday started again with nasal and mild chest congestion, tested negative for Covid on Monday, denies SOA, wheezing, chills, body aches or fever.         Objective   Vital Signs:   /72   Pulse 82   Temp 97.1 °F (36.2 °C)   Ht 162.6 cm (64\")   Wt 74.8 kg (165 lb)   SpO2 97%   BMI 28.32 kg/m²     Physical Exam  Constitutional:       Appearance: He is well-developed.   HENT:      Head: Normocephalic.      Nose: Nasal tenderness and congestion present.      Mouth/Throat:      Tonsils: No tonsillar exudate. 1+ on the right. 1+ on the left.   Cardiovascular:      Rate and Rhythm: Normal rate and regular rhythm.      Pulses: Normal pulses.      Heart sounds: Normal heart sounds.   Pulmonary:      Effort: Pulmonary effort is normal.      Breath sounds: Normal breath sounds.   Neurological:      Mental Status: He is alert and oriented to person, place, and time.        Result Review :                 Assessment and Plan    Diagnoses and all orders for this visit:    1. Acute non-recurrent maxillary sinusitis (Primary)  -     azithromycin (Zithromax Z-Jeferson) 250 MG tablet; Take 2 tablets by mouth on day 1, then 1 tablet daily on days 2-5  Dispense: 6 tablet; Refill: 0    2. Chest congestion        Follow Up   Return if symptoms worsen or fail to improve.  Patient was given instructions and counseling regarding his condition or for health maintenance advice. Please see specific information pulled into the AVS if appropriate.       "
IV pump/oxygen/IV/cardiac monitor

## 2022-11-19 NOTE — H&P ADULT - CARDIOVASCULAR DETAILS
You can access the FollowMyHealth Patient Portal offered by [facility] by registering at the following website: [email or web address]. By joining [employer]’s FollowMyHealth portal, you will also be able to view your health information using other applications (apps) compatible with our system. tachycardia/positive S1/positive S2

## 2022-11-30 NOTE — ED PROVIDER NOTE - ENMT NEGATIVE STATEMENT, MLM
none
Ears: +right ear pain, no hearing problems.Nose: no nasal congestion and no nasal drainage.Mouth/Throat:  throat pain. +swollen glands in neck

## 2022-12-13 NOTE — CONSULT NOTE PEDS - PROBLEM SELECTOR RECOMMENDATION 9
yes rule out PUD, rule out mild pancreatitis but not seen on cat scan, rule out due to mild DKA, doubt cholecystitis. Will start pantoprazole and plan for EGD in AM. Will repeat lipae and get an amylase. Continue IV fluids.

## 2023-01-30 NOTE — PROVIDER CONTACT NOTE (CHANGE IN STATUS NOTIFICATION) - DATE AND TIME:
01-Jan-2017 22:30 Mercedes Flap Text: The defect edges were debeveled with a #15 scalpel blade.  Given the location of the defect, shape of the defect and the proximity to free margins a Mercedes flap was deemed most appropriate.  Using a sterile surgical marker, an appropriate advancement flap was drawn incorporating the defect and placing the expected incisions within the relaxed skin tension lines where possible. The area thus outlined was incised deep to adipose tissue with a #15 scalpel blade.  The skin margins were undermined to an appropriate distance in all directions utilizing iris scissors.

## 2023-04-19 NOTE — ED ADULT TRIAGE NOTE - HEIGHT IN CM
160.02 Topical Retinoid counseling:  Patient advised to apply a pea-sized amount only at bedtime and wait 30 minutes after washing their face before applying.  If too drying, patient may add a non-comedogenic moisturizer. The patient verbalized understanding of the proper use and possible adverse effects of retinoids.  All of the patient's questions and concerns were addressed.

## 2023-05-24 NOTE — ED ADULT NURSE NOTE - CAS DISCH ACCOMP BY
Pt presents ambulatory into ed a&ox3, no acute distress, breaths even and unlabored c/o sinus pain and nasal congestion x 3 days. Pt reports taking allergy medicine at home without improvement. Denies cough, fever, sore throat. Self

## 2023-08-28 NOTE — ED ADULT TRIAGE NOTE - NSWEIGHTCALCTOOLDRUG_GEN_A_CORE
used Graft Donor Site Bandage (Optional-Leave Blank If You Don't Want In Note): Steri-strips and a pressure bandage were applied to the donor site.

## 2023-09-21 NOTE — ED ADULT NURSE NOTE - CADM POA PRESS ULCER
----- Message from Cuauhtemoc Arredondo MD sent at 9/14/2023 10:31 PM CDT -----  PSA is slowly creeping up, lets have him see Dr. Connor.  KN   No

## 2024-01-10 NOTE — ED PROVIDER NOTE - CONSTITUTIONAL, MLM
normal... Well appearing, well nourished, awake, alert, oriented to person, place, time/situation and in no apparent distress. monthly or less

## 2024-03-04 NOTE — ED ADULT NURSE NOTE - CAS ELECT INFOMATION PROVIDED
Medication:   Requested Prescriptions     Pending Prescriptions Disp Refills    DULoxetine (CYMBALTA) 60 MG extended release capsule [Pharmacy Med Name: DULOXETINE HYDROCHLORIDE 60MG CAPSULE DR PART] 30 capsule 2     Sig: TAKE ONE CAPSULE BY MOUTH IN THE MORNING.      Provider out of office.     Patient Phone Number: [phone] (home)     Last appt: 11/15/2023   Next appt: 3/13/2024    Last OARRS:       9/27/2019    12:42 PM   RX Monitoring   Periodic Controlled Substance Monitoring Possible medication side effects, risk of tolerance/dependence & alternative treatments discussed.   Chronic Pain > 50 MEDD Re-evaluated the status of the patient's underlying condition causing pain.     PDMP Monitoring:    Last PDMP [place] as Reviewed ([place]):  Review User Review Instant Review Result   JAYME JUSTICE 9/15/2021  7:05 PM Reviewed PDMP [1]     Preferred Pharmacy:   [id number] - [place], [place] - [address] [phone] - F [phone]  [facility] [address]  [facility] [address]  Phone: [phone] Fax: [phone]    [facility] - [place], [place] - [address]  - P [phone] - F [phone]  [address] [facility] [address]  Phone: [phone] Fax: [phone]    [facility] - [place], [place] - [address] - P [phone] - F [phone] [address]  [facility] [address]  Phone: [phone] Fax: [phone]    
DC instructions

## 2024-05-13 NOTE — ED ADULT NURSE NOTE - NSSISCREENINGQ1_ED_A_ED
"Enter Query Response Below      Query Response: None excisional debridement with complex wound closure.             If applicable, please update the problem list.     Patient: Keri Bhagat        : 1964  Account: [id number]           Admit Date: 2024        How to Respond to this query:       a. Click New Note     b. Answer query within the yellow box.                c. Update the Problem List, if applicable.      If you have any questions about this query contact me at: [email or web address]     Dr. Gutierrez,    59-year-old female admitted with left knee wound dehiscence status post total knee arthroplasty. Your op note documents Procedure: \"Irrigation debridement with complex wound closure, left knee\" then under description of procedure \"length of the wound was about 5 cm; This was just a dehiscence of the skin and subcutaneous tissues but did not extend deeper; There was hematoma in the wound which was evacuated; No necrosis, purulence or other concerning findings were noted; I debrided the skin edges with a scalpel.  I debrided some of the old hematoma with a rongeur.  Following this, the wound was copiously irrigated out with a liter of sterile saline mixed with Betadine followed by 3 L of sterile saline via pulsatile lavage.\"    Please clarify procedure performed:    Excisional debridement  Non-excisional debridement  Other- specify __________  Unable to determine    Definitions:  Excisional debridement- surgical removal or cutting away of devitalized tissue, necrosis, or slough.  Non-excisional debridement- describes nonoperative brushing, irrigating, scrubbing, or washing away of devitalized tissue, necrosis, or slough (e.g. Versajet, irrigation)    By submitting this query, we are merely seeking further clarification of documentation to accurately reflect all conditions that you are monitoring, evaluating, treating or that extend the hospitalization or utilize additional resources of " care. Please utilize your independent clinical judgment when addressing the question(s) above.     This query and your response, once completed, will be entered into the legal medical record.    Sincerely,  Gloria Kimbrough RN   Clinical Documentation Integrity Program    No

## 2024-05-20 NOTE — ED STATDOCS - NS ED MD DISPO DISCHARGE CCDA
[Home] : at home, [unfilled] , at the time of the visit. [Medical Office: ([facility])___] : at the medical office located in  [Patient] : the patient [This encounter was initiated by telehealth (audio with video) and converted to telephone (audio only) due to technical difficulties.] : This encounter was initiated by telehealth (audio with video) and converted to telephone (audio only) due to technical difficulties. [Follow-Up: _____] : a [unfilled] follow-up visit Patient/Caregiver provided printed discharge information.

## 2024-08-02 NOTE — ED ADULT TRIAGE NOTE - NS ED NURSE DIRECT TO ROOM YN
Skin Checks Recommendations: The patient was educated on the need to perform monthly self skin exams to identify new or changing lesions.
Sunscreen Recommendations: Patient was instructed to apply a broad band SPF 30 or higher sunscreen to sun exposed skin.
Detail Level: Detailed
Detail Level: Generalized
Sunscreen Recommendations: Patient was encouraged to wear broad spectrum SPF 30 or higher sunscreen to sun exposed skin
No
Sunscreen Recommendations: The patient was instructed to apply a broad spectrum SPF30 or higher sunscreen to sun exposed areas.
Nicotinamide Supplementation Recommendations: The patient was instructed to take Nicotinamide 500mg twice daily to reduce risk of skin cancer.
Detail Level: Zone
Detail Level: Simple

## 2024-08-23 NOTE — ED ADULT NURSE NOTE - AS SC BRADEN SENSORY
[FreeTextEntry1] : 78 yo f with severe aortic stenosis Very well known to Dr. Howard, pt of Dr. Lui Last seen by Dr. Howard 4) no impairment

## 2024-08-27 NOTE — PATIENT PROFILE ADULT - NSPROEDAABILITYLEARN_GEN_A_NUR
Pediatric Neurology  [facility]  Pediatric Specialty Clinic      Pediatric Call Center [phone]    RN Care Coordinator:  [phone]    After Hours and Emergency:  [phone]    Prescription renewals:  Your pharmacy must fax request to [phone]  Please allow 2-3 days for prescriptions to be authorized      If your physician has ordered an EEG please call [phone] to schedule.    If your physician has ordered an x-ray or MRI, you may call [phone] to schedule.    Please consider signing up for [employer] for confidential electronic communication and access to your health records.  Please sign up at the , or go to [email or web address].          none

## 2024-09-30 NOTE — ED ADULT TRIAGE NOTE - CHIEF COMPLAINT QUOTE
pt comes to ed from home for change in mental status. patient tachypneic, tachycardiac with high blood sugar. patient hx dka. dr gato de la garzaitely to bedside. has line from ems receiving 1 liter. code team to bedside. unable to speak.
Contraindicated

## 2025-03-17 NOTE — ED ADULT NURSE NOTE - PSH
PREOPERATIVE NOTE: I have seen and examined the patient today.  Critical physical examination findings, laboratory indices, and radiographic studies reviewed.  I recommend a laparoscopic cholecystectomy and intraoperative cholangiogram.    The operative strategy was explained and reviewed. Alternatives discussed. Potential complications including, but not limited to, infection, bleeding, damage to adjacent structures, and anesthetic complications were discussed. Questions were elicited and answered to the patient's satisfaction.  Operative consent signed.        ____________________________________     Ismael Rodriguez M.D.    DD: 3/17/2025  8:49 AM      delivery delivered